# Patient Record
Sex: MALE | Race: WHITE | NOT HISPANIC OR LATINO | Employment: OTHER | URBAN - METROPOLITAN AREA
[De-identification: names, ages, dates, MRNs, and addresses within clinical notes are randomized per-mention and may not be internally consistent; named-entity substitution may affect disease eponyms.]

---

## 2017-02-15 ENCOUNTER — DOCUMENTATION ONLY (OUTPATIENT)
Dept: FAMILY MEDICINE | Facility: CLINIC | Age: 67
End: 2017-02-15

## 2017-02-15 NOTE — PROGRESS NOTES
Pre-Visit Chart Review  For Appointment Scheduled on 2-16-17    Health Maintenance Due   Topic Date Due    Zoster Vaccine  03/31/2008    Colonoscopy  11/01/2014    Influenza Vaccine  08/01/2016

## 2017-02-16 ENCOUNTER — OFFICE VISIT (OUTPATIENT)
Dept: FAMILY MEDICINE | Facility: CLINIC | Age: 67
End: 2017-02-16
Payer: MEDICARE

## 2017-02-16 ENCOUNTER — LAB VISIT (OUTPATIENT)
Dept: LAB | Facility: HOSPITAL | Age: 67
End: 2017-02-16
Attending: FAMILY MEDICINE
Payer: MEDICARE

## 2017-02-16 VITALS
SYSTOLIC BLOOD PRESSURE: 132 MMHG | TEMPERATURE: 98 F | DIASTOLIC BLOOD PRESSURE: 85 MMHG | HEIGHT: 69 IN | HEART RATE: 57 BPM | BODY MASS INDEX: 29.46 KG/M2 | WEIGHT: 198.88 LBS

## 2017-02-16 DIAGNOSIS — Z23 FLU VACCINE NEED: ICD-10-CM

## 2017-02-16 DIAGNOSIS — I10 ESSENTIAL HYPERTENSION: Primary | ICD-10-CM

## 2017-02-16 DIAGNOSIS — H61.21 HEARING LOSS OF RIGHT EAR DUE TO CERUMEN IMPACTION: ICD-10-CM

## 2017-02-16 DIAGNOSIS — I10 ESSENTIAL HYPERTENSION: ICD-10-CM

## 2017-02-16 LAB
ALBUMIN SERPL BCP-MCNC: 3.9 G/DL
ALP SERPL-CCNC: 97 U/L
ALT SERPL W/O P-5'-P-CCNC: 23 U/L
ANION GAP SERPL CALC-SCNC: 6 MMOL/L
AST SERPL-CCNC: 18 U/L
BILIRUB SERPL-MCNC: 1 MG/DL
BUN SERPL-MCNC: 16 MG/DL
CALCIUM SERPL-MCNC: 9.3 MG/DL
CHLORIDE SERPL-SCNC: 105 MMOL/L
CHOLEST/HDLC SERPL: 4.1 {RATIO}
CO2 SERPL-SCNC: 29 MMOL/L
CREAT SERPL-MCNC: 1.1 MG/DL
EST. GFR  (AFRICAN AMERICAN): >60 ML/MIN/1.73 M^2
EST. GFR  (NON AFRICAN AMERICAN): >60 ML/MIN/1.73 M^2
GLUCOSE SERPL-MCNC: 99 MG/DL
HDL/CHOLESTEROL RATIO: 24.2 %
HDLC SERPL-MCNC: 194 MG/DL
HDLC SERPL-MCNC: 47 MG/DL
LDLC SERPL CALC-MCNC: 116 MG/DL
NONHDLC SERPL-MCNC: 147 MG/DL
POTASSIUM SERPL-SCNC: 4.9 MMOL/L
PROT SERPL-MCNC: 6.9 G/DL
SODIUM SERPL-SCNC: 140 MMOL/L
TRIGL SERPL-MCNC: 155 MG/DL

## 2017-02-16 PROCEDURE — 1157F ADVNC CARE PLAN IN RCRD: CPT | Mod: S$GLB,,, | Performed by: FAMILY MEDICINE

## 2017-02-16 PROCEDURE — 1159F MED LIST DOCD IN RCRD: CPT | Mod: S$GLB,,, | Performed by: FAMILY MEDICINE

## 2017-02-16 PROCEDURE — 3075F SYST BP GE 130 - 139MM HG: CPT | Mod: S$GLB,,, | Performed by: FAMILY MEDICINE

## 2017-02-16 PROCEDURE — 1126F AMNT PAIN NOTED NONE PRSNT: CPT | Mod: S$GLB,,, | Performed by: FAMILY MEDICINE

## 2017-02-16 PROCEDURE — G0008 ADMIN INFLUENZA VIRUS VAC: HCPCS | Mod: S$GLB,,, | Performed by: FAMILY MEDICINE

## 2017-02-16 PROCEDURE — 80061 LIPID PANEL: CPT

## 2017-02-16 PROCEDURE — 99999 PR PBB SHADOW E&M-EST. PATIENT-LVL III: CPT | Mod: PBBFAC,,, | Performed by: FAMILY MEDICINE

## 2017-02-16 PROCEDURE — 99214 OFFICE O/P EST MOD 30 MIN: CPT | Mod: 25,S$GLB,, | Performed by: FAMILY MEDICINE

## 2017-02-16 PROCEDURE — 90662 IIV NO PRSV INCREASED AG IM: CPT | Mod: S$GLB,,, | Performed by: FAMILY MEDICINE

## 2017-02-16 PROCEDURE — 80053 COMPREHEN METABOLIC PANEL: CPT

## 2017-02-16 PROCEDURE — 99499 UNLISTED E&M SERVICE: CPT | Mod: S$GLB,,, | Performed by: FAMILY MEDICINE

## 2017-02-16 PROCEDURE — 1160F RVW MEDS BY RX/DR IN RCRD: CPT | Mod: S$GLB,,, | Performed by: FAMILY MEDICINE

## 2017-02-16 PROCEDURE — 69209 REMOVE IMPACTED EAR WAX UNI: CPT | Mod: S$GLB,,, | Performed by: FAMILY MEDICINE

## 2017-02-16 PROCEDURE — 3079F DIAST BP 80-89 MM HG: CPT | Mod: S$GLB,,, | Performed by: FAMILY MEDICINE

## 2017-02-16 PROCEDURE — 36415 COLL VENOUS BLD VENIPUNCTURE: CPT | Mod: PO

## 2017-02-16 RX ORDER — HYDROCODONE BITARTRATE AND ACETAMINOPHEN 10; 325 MG/1; MG/1
TABLET ORAL
COMMUNITY
Start: 2016-12-29 | End: 2017-02-16

## 2017-02-16 RX ORDER — CEPHALEXIN 500 MG/1
CAPSULE ORAL
COMMUNITY
Start: 2016-12-08 | End: 2017-02-16

## 2017-02-16 RX ORDER — LISINOPRIL 10 MG/1
10 TABLET ORAL DAILY
Qty: 90 TABLET | Refills: 3 | Status: SHIPPED | OUTPATIENT
Start: 2017-02-16 | End: 2018-02-04 | Stop reason: SDUPTHER

## 2017-02-16 NOTE — PROCEDURES
"Ear Cerumen Removal  Date/Time: 2/16/2017 8:45 AM  Performed by: HELENA HADLEY  Authorized by: HELENA HADLEY     Time out: Immediately prior to procedure a "time out" was called to verify the correct patient, procedure, equipment, support staff and site/side marked as required.    Consent Done?:  Yes (Verbal)  Location details:  Right ear  Procedure type: irrigation    Cerumen  Removal Results:  Cerumen completely removed  Patient tolerance:  Patient tolerated the procedure well with no immediate complications      "

## 2017-02-16 NOTE — MR AVS SNAPSHOT
[facility]  [address] [address]  Phone: [phone]  Fax: [phone]                  Efraín Anton   2017 8:00 AM   Office Visit    Description:  Male : 1950   Provider:  Sandi Nix MD   Department:  [facility]           Reason for Visit     Follow-up     Ear Fullness           Diagnoses this Visit        Comments    Essential hypertension    -  Primary     Flu vaccine need         Hearing loss of right ear due to cerumen impaction                To Do List           Future Appointments        Provider Department Dept Phone    2017 9:00 AM [facility]NALE[facility] [phone]    2017 8:00 AM Sandi Nix MD [facility] [phone]      Goals (5 Years of Data)     None      Follow-Up and Disposition     Return in about 6 months (around 2017).    Follow-up and Disposition History       These Medications        Disp Refills Start End    lisinopril 10 MG tablet 90 tablet 3 2017    Take 1 tablet (10 mg total) by mouth once daily. - Oral    Pharmacy: [facility] [address] [place], [place] [id number] [facility] Ph #: [phone]         [facility] On Call     [facility] On Call Nurse Care Line -  Assistance  Registered nurses in the [facility] provide clinical advisement, health education, appointment booking, and other advisory services.  Call for this free service at [phone].             Medications           STOP taking these medications     cephALEXin (KEFLEX) 500 MG capsule     hydrocodone-acetaminophen 10-325mg (NORCO)  mg Tab     meloxicam (MOBIC) 7.5 MG tablet Take 1 tablet (7.5 mg total) by mouth once daily.           Verify that the below list of medications is an accurate representation of the medications you are currently taking.  If none reported, the list may be blank. If incorrect, please contact your healthcare provider. Carry this list  "with you in case of emergency.           Current Medications     lisinopril 10 MG tablet Take 1 tablet (10 mg total) by mouth once daily.    triamcinolone acetonide 0.1% (KENALOG) 0.1 % cream APPLY TO AFFECTED AREA TWICE A DAY           Clinical Reference Information           Your Vitals Were     BP Pulse Temp Height Weight BMI    132/85 (BP Location: Right arm, Patient Position: Sitting, BP Method: Automatic) 57 97.8 °F (36.6 °C) (Oral) 5' 9" (1.753 m) 90.2 kg (198 lb 13.7 oz) 29.37 kg/m2      Blood Pressure          Most Recent Value    BP  132/85      Allergies as of 2/16/2017     No Known Allergies      Immunizations Administered on Date of Encounter - 2/16/2017     Name Date Dose VIS Date Route    Influenza - High Dose 2/16/2017 0.5 mL 8/7/2015 Intramuscular      Orders Placed During Today's Visit      Normal Orders This Visit    EAR CERUMEN REMOVAL     Ear wax removal     Influenza - High Dose (65+) (PF) (IM)     Future Labs/Procedures Expected by Expires    Comprehensive metabolic panel  2/16/2017 4/17/2018    Lipid panel  2/16/2017 4/17/2018      [facility] Sign-Up     Activating your [facility] account is as easy as 1-2-3!     1) Visit [email or web address], select Sign Up Now, enter this activation code and your date of birth, then select Next.  Activation code not generated  Current Patient Portal Status: Account disabled      2) Create a username and password to use when you visit [facility] in the future and select a security question in case you lose your password and select Next.    3) Enter your e-mail address and click Sign Up!    Additional Information  If you have questions, please e-mail [email or web address] or call [phone] to talk to our [facility] staff. Remember, [facility] is NOT to be used for urgent needs. For medical emergencies, dial 911.         Instructions      Controlling High Blood Pressure  High blood pressure (hypertension) is often called the silent killer. This is because many people " who have it dont know it. High blood pressure is defined as 140/90 mm Hg or higher. Know your blood pressure and remember to check it regularly. Doing so can save your life. Here are some things you can do to help control your blood pressure.    Choose heart-healthy foods  · Select low-salt, low-fat foods. Limit sodium intake to 2,400 mg per day or the amount suggested by your healthcare provider.  · Limit canned, dried, cured, packaged, and fast foods. These can contain a lot of salt.  · Eat 8 to 10 servings of fruits and vegetables every day.  · Choose lean meats, fish, or chicken.  · Eat whole-grain pasta, brown rice, and beans.  · Eat 2 to 3 servings of low-fat or fat-free dairy products.  · Ask your doctor about the DASH eating plan. This plan helps reduce blood pressure.  · When you go to a restaurant, ask that your meal be prepared with no added salt.  Maintain a healthy weight  · Ask your healthcare provider how many calories to eat a day. Then stick to that number.  · Ask your healthcare provider what weight range is healthiest for you. If you are overweight, a weight loss of only 3% to 5% of your body weight can help lower blood pressure. Generally, a good weight loss goal is to lose 10% of your body weight in a year.  · Limit snacks and sweets.  · Get regular exercise.  Get up and get active  · Choose activities you enjoy. Find ones you can do with friends or family. This includes bicycling, dancing, walking, and jogging.  · Park farther away from building entrances.  · Use stairs instead of the elevator.  · When you can, walk or bike instead of driving.  · [place] leaves, garden, or do household repairs.  · Be active at a moderate to vigorous level of physical activity for at least 40 minutes for a minimum of 3 to 4 days a week.   Manage stress  · Make time to relax and enjoy life. Find time to laugh.  · Communicate your concerns with your loved ones and your healthcare provider.  · Visit with family and  friends, and keep up with hobbies.  Limit alcohol and quit smoking  · Men should have no more than 2 drinks per day.  · Women should have no more than 1 drink per day.  · Talk with your healthcare provider about quitting smoking. Smoking significantly increases your risk for heart disease and stroke. Ask your healthcare provider about community smoking cessation programs and other options.  Medicines  If lifestyle changes arent enough, your healthcare provider may prescribe high blood pressure medicine. Take all medicines as prescribed. If you have any questions about your medicines, ask your healthcare provider before stopping or changing them.   Date Last Reviewed: 4/27/2016  © 3312-4501 [employer]. [address] [place] [address], [place] [address]. All rights reserved. This information is not intended as a substitute for professional medical care. Always follow your healthcare professional's instructions.             Language Assistance Services     ATTENTION: Language assistance services are available, free of charge. Please call [phone].      ATENCIÓN: Si habla español, tiene a macias disposición servicios gratuitos de asistencia lingüística. Llame al [phone].     [facility] Ý: N?u b?n nói Ti?ng Vi?t, có các d?ch v? h? tr? ngôn ng? mi?n phí dành cho b?n. G?i s? [phone].         [facility] complies with applicable Federal civil rights laws and does not discriminate on the basis of race, color, national origin, age, disability, or sex.

## 2017-02-16 NOTE — PROGRESS NOTES
Subjective:       Patient ID: Efraín Anton is a 66 y.o. male.    Chief Complaint: Follow-up and Ear Fullness    Patient Active Problem List   Diagnosis    Hypertension    Primary osteoarthritis of both shoulders    Tinnitus of right ear    Eczema    Arthritis of shoulder    Arthritis of left shoulder region   C/o feeling of ears clogging up pierce right ear.  Did use q tips and does occ wear hearing aids.  [facility] them [facility] a year [facility]    HPI  Review of Systems   Constitutional: Negative for fatigue and unexpected weight change.   Respiratory: Negative for chest tightness and shortness of breath.    Cardiovascular: Negative for chest pain, palpitations and leg swelling.   Gastrointestinal: Negative for abdominal pain.   Endocrine: Negative for polydipsia, polyphagia and polyuria.   Musculoskeletal: Negative for arthralgias.   Neurological: Negative for dizziness, syncope, light-headedness and headaches.       Objective:      Physical Exam   Constitutional: He is oriented to person, place, and time. He appears well-developed and well-nourished.   HENT:   Right Ear: Tympanic membrane normal.   Left Ear: Tympanic membrane and ear canal normal.   Right canal dark cerumen close to TM   Cardiovascular: Normal rate, regular rhythm and normal heart sounds.    Pulmonary/Chest: Effort normal and breath sounds normal.   Musculoskeletal: He exhibits no edema.   Neurological: He is alert and oriented to person, place, and time.   Skin: Skin is warm and dry.   Psychiatric: He has a normal mood and affect.   Nursing note and vitals reviewed.      Assessment:       1. Essential hypertension    2. Flu vaccine need    3. Hearing loss of right ear due to cerumen impaction        Plan:       1. Essential hypertension  Controlled on current medications.  Continue current medications.    - Comprehensive metabolic panel; Future  - Lipid panel; Future  - lisinopril 10 MG tablet; Take 1 tablet (10 mg total) by mouth once daily.   Dispense: 90 tablet; Refill: 3    2. Flu vaccine need  Immunize today.  Counseled patient on risks, benefits and side effects.  Patient elected to proceed with vaccination.    - Influenza - High Dose (65+) (PF) (IM)    3. Hearing loss of right ear due to cerumen impaction  S/p  - Ear wax removal      [facility] goal documentation:  Patient readiness: acceptance and barriers:readiness  During the course of the visit the patient was educated and counseled about the following: Hypertension:   Dietary sodium restriction.  Regular aerobic exercise.  Goals: Hypertension: Reduce Blood Pressure  Goal/Outcomes met:Hypertension  The following self management tools provided:none  Patient Instructions (the written plan) was given to the patient/family: Yes  Time spent with patient: 20 minutes    Patient with be reevaluated in 6 months or sooner prn.  Will get colonoscopy at [facility]    Greater than 50% of this visit was spent counseling as described in above documentation:Yes

## 2017-02-16 NOTE — PATIENT INSTRUCTIONS
Controlling High Blood Pressure  High blood pressure (hypertension) is often called the silent killer. This is because many people who have it dont know it. High blood pressure is defined as 140/90 mm Hg or higher. Know your blood pressure and remember to check it regularly. Doing so can save your life. Here are some things you can do to help control your blood pressure.    Choose heart-healthy foods  · Select low-salt, low-fat foods. Limit sodium intake to 2,400 mg per day or the amount suggested by your healthcare provider.  · Limit canned, dried, cured, packaged, and fast foods. These can contain a lot of salt.  · Eat 8 to 10 servings of fruits and vegetables every day.  · Choose lean meats, fish, or chicken.  · Eat whole-grain pasta, brown rice, and beans.  · Eat 2 to 3 servings of low-fat or fat-free dairy products.  · Ask your doctor about the DASH eating plan. This plan helps reduce blood pressure.  · When you go to a restaurant, ask that your meal be prepared with no added salt.  Maintain a healthy weight  · Ask your healthcare provider how many calories to eat a day. Then stick to that number.  · Ask your healthcare provider what weight range is healthiest for you. If you are overweight, a weight loss of only 3% to 5% of your body weight can help lower blood pressure. Generally, a good weight loss goal is to lose 10% of your body weight in a year.  · Limit snacks and sweets.  · Get regular exercise.  Get up and get active  · Choose activities you enjoy. Find ones you can do with friends or family. This includes bicycling, dancing, walking, and jogging.  · Park farther away from building entrances.  · Use stairs instead of the elevator.  · When you can, walk or bike instead of driving.  · [place] leaves, garden, or do household repairs.  · Be active at a moderate to vigorous level of physical activity for at least 40 minutes for a minimum of 3 to 4 days a week.   Manage stress  · Make time to relax and enjoy  life. Find time to laugh.  · Communicate your concerns with your loved ones and your healthcare provider.  · Visit with family and friends, and keep up with hobbies.  Limit alcohol and quit smoking  · Men should have no more than 2 drinks per day.  · Women should have no more than 1 drink per day.  · Talk with your healthcare provider about quitting smoking. Smoking significantly increases your risk for heart disease and stroke. Ask your healthcare provider about community smoking cessation programs and other options.  Medicines  If lifestyle changes arent enough, your healthcare provider may prescribe high blood pressure medicine. Take all medicines as prescribed. If you have any questions about your medicines, ask your healthcare provider before stopping or changing them.   Date Last Reviewed: 4/27/2016  © 1129-3216 [employer]. [address] [place] [address], [place] [address]. All rights reserved. This information is not intended as a substitute for professional medical care. Always follow your healthcare professional's instructions.

## 2017-02-21 ENCOUNTER — TELEPHONE (OUTPATIENT)
Dept: FAMILY MEDICINE | Facility: CLINIC | Age: 67
End: 2017-02-21

## 2017-02-21 NOTE — TELEPHONE ENCOUNTER
----- Message from Beena Augustine sent at 2/21/2017  8:56 AM CST -----  Contact: pt [phone]   Call placed to pod. Patient called back for his lab results.

## 2017-08-14 ENCOUNTER — DOCUMENTATION ONLY (OUTPATIENT)
Dept: FAMILY MEDICINE | Facility: CLINIC | Age: 67
End: 2017-08-14

## 2017-08-14 NOTE — PROGRESS NOTES
Pre-Visit Chart Review  For Appointment Scheduled on 08/16/2017    Health Maintenance Due   Topic Date Due    Zoster Vaccine  03/31/2008    Colonoscopy  11/01/2014    Influenza Vaccine  08/01/2017

## 2017-08-16 ENCOUNTER — HOSPITAL ENCOUNTER (OUTPATIENT)
Dept: RADIOLOGY | Facility: HOSPITAL | Age: 67
Discharge: HOME | End: 2017-08-16
Attending: ORTHOPAEDIC SURGERY
Payer: MEDICARE

## 2017-08-16 ENCOUNTER — OFFICE VISIT (OUTPATIENT)
Dept: ORTHOPEDICS | Facility: CLINIC | Age: 67
End: 2017-08-16
Payer: MEDICARE

## 2017-08-16 ENCOUNTER — OFFICE VISIT (OUTPATIENT)
Dept: FAMILY MEDICINE | Facility: CLINIC | Age: 67
End: 2017-08-16
Payer: MEDICARE

## 2017-08-16 VITALS — WEIGHT: 197 LBS | HEIGHT: 69 IN | BODY MASS INDEX: 29.18 KG/M2

## 2017-08-16 VITALS
BODY MASS INDEX: 29.19 KG/M2 | HEART RATE: 64 BPM | WEIGHT: 197.06 LBS | HEIGHT: 69 IN | TEMPERATURE: 98 F | DIASTOLIC BLOOD PRESSURE: 86 MMHG | SYSTOLIC BLOOD PRESSURE: 127 MMHG

## 2017-08-16 DIAGNOSIS — M25.511 ACUTE PAIN OF BOTH SHOULDERS: Primary | ICD-10-CM

## 2017-08-16 DIAGNOSIS — Z86.010 HISTORY OF COLON POLYPS: ICD-10-CM

## 2017-08-16 DIAGNOSIS — K22.2 ESOPHAGEAL STRICTURE: ICD-10-CM

## 2017-08-16 DIAGNOSIS — M19.012 PRIMARY OSTEOARTHRITIS OF BOTH SHOULDERS: Primary | ICD-10-CM

## 2017-08-16 DIAGNOSIS — M19.011 PRIMARY OSTEOARTHRITIS OF BOTH SHOULDERS: Primary | ICD-10-CM

## 2017-08-16 DIAGNOSIS — M25.511 ACUTE PAIN OF BOTH SHOULDERS: ICD-10-CM

## 2017-08-16 DIAGNOSIS — M25.512 ACUTE PAIN OF BOTH SHOULDERS: Primary | ICD-10-CM

## 2017-08-16 DIAGNOSIS — R13.10 DYSPHAGIA, UNSPECIFIED TYPE: ICD-10-CM

## 2017-08-16 DIAGNOSIS — M19.012 ARTHRITIS OF LEFT SHOULDER REGION: ICD-10-CM

## 2017-08-16 DIAGNOSIS — M25.512 ACUTE PAIN OF BOTH SHOULDERS: ICD-10-CM

## 2017-08-16 DIAGNOSIS — M19.011 PRIMARY OSTEOARTHRITIS OF RIGHT SHOULDER: Primary | ICD-10-CM

## 2017-08-16 PROCEDURE — 73030 X-RAY EXAM OF SHOULDER: CPT | Mod: 26,50,, | Performed by: RADIOLOGY

## 2017-08-16 PROCEDURE — 99499 UNLISTED E&M SERVICE: CPT | Mod: S$GLB,,, | Performed by: FAMILY MEDICINE

## 2017-08-16 PROCEDURE — 99214 OFFICE O/P EST MOD 30 MIN: CPT | Mod: S$GLB,,, | Performed by: FAMILY MEDICINE

## 2017-08-16 PROCEDURE — 99214 OFFICE O/P EST MOD 30 MIN: CPT | Mod: S$GLB,,, | Performed by: ORTHOPAEDIC SURGERY

## 2017-08-16 PROCEDURE — 3008F BODY MASS INDEX DOCD: CPT | Mod: S$GLB,,, | Performed by: FAMILY MEDICINE

## 2017-08-16 PROCEDURE — 3008F BODY MASS INDEX DOCD: CPT | Mod: S$GLB,,, | Performed by: ORTHOPAEDIC SURGERY

## 2017-08-16 PROCEDURE — 73030 X-RAY EXAM OF SHOULDER: CPT | Mod: TC,50,PN

## 2017-08-16 PROCEDURE — 3079F DIAST BP 80-89 MM HG: CPT | Mod: S$GLB,,, | Performed by: FAMILY MEDICINE

## 2017-08-16 PROCEDURE — 3078F DIAST BP <80 MM HG: CPT | Mod: S$GLB,,, | Performed by: ORTHOPAEDIC SURGERY

## 2017-08-16 PROCEDURE — 99999 PR PBB SHADOW E&M-EST. PATIENT-LVL II: CPT | Mod: PBBFAC,,, | Performed by: ORTHOPAEDIC SURGERY

## 2017-08-16 PROCEDURE — 1159F MED LIST DOCD IN RCRD: CPT | Mod: S$GLB,,, | Performed by: FAMILY MEDICINE

## 2017-08-16 PROCEDURE — 3074F SYST BP LT 130 MM HG: CPT | Mod: S$GLB,,, | Performed by: FAMILY MEDICINE

## 2017-08-16 PROCEDURE — 1159F MED LIST DOCD IN RCRD: CPT | Mod: S$GLB,,, | Performed by: ORTHOPAEDIC SURGERY

## 2017-08-16 PROCEDURE — 3074F SYST BP LT 130 MM HG: CPT | Mod: S$GLB,,, | Performed by: ORTHOPAEDIC SURGERY

## 2017-08-16 PROCEDURE — 1125F AMNT PAIN NOTED PAIN PRSNT: CPT | Mod: S$GLB,,, | Performed by: FAMILY MEDICINE

## 2017-08-16 PROCEDURE — 99999 PR PBB SHADOW E&M-EST. PATIENT-LVL III: CPT | Mod: PBBFAC,,, | Performed by: FAMILY MEDICINE

## 2017-08-16 PROCEDURE — 1125F AMNT PAIN NOTED PAIN PRSNT: CPT | Mod: S$GLB,,, | Performed by: ORTHOPAEDIC SURGERY

## 2017-08-16 NOTE — LETTER
August 16, 2017      Sandi Nix MD  [address]  [facility] [address]           [address] [address]  Phone: [phone]          Patient: Efraín Anton   MR Number: [id number]   YOB: 1950   Date of Visit: 8/16/2017       Dear Dr. Sandi Nix:    Thank you for referring Efraín Anton to me for evaluation. Attached you will find relevant portions of my assessment and plan of care.    If you have questions, please do not hesitate to call me. I look forward to following Efraín Anton along with you.    Sincerely,    Ricky Lobo MD    Enclosure  CC:  No Recipients    If you would like to receive this communication electronically, please contact [email or web address] or [phone] to request more information on [employer] Link access.    For providers and/or their staff who would like to refer a patient to [facility], please contact us through our one-stop-shop provider referral line, [facility], at [phone].    If you feel you have received this communication in error or would no longer like to receive these types of communications, please e-mail [email or web address]

## 2017-08-17 ENCOUNTER — INITIAL CONSULT (OUTPATIENT)
Dept: GASTROENTEROLOGY | Facility: CLINIC | Age: 67
End: 2017-08-17
Payer: MEDICARE

## 2017-08-17 VITALS
SYSTOLIC BLOOD PRESSURE: 116 MMHG | WEIGHT: 195.13 LBS | DIASTOLIC BLOOD PRESSURE: 76 MMHG | HEART RATE: 67 BPM | BODY MASS INDEX: 28.9 KG/M2 | HEIGHT: 69 IN | RESPIRATION RATE: 20 BRPM

## 2017-08-17 DIAGNOSIS — R13.19 OTHER DYSPHAGIA: Primary | ICD-10-CM

## 2017-08-17 DIAGNOSIS — R13.10 DYSPHAGIA, UNSPECIFIED TYPE: Primary | ICD-10-CM

## 2017-08-17 PROCEDURE — 99205 OFFICE O/P NEW HI 60 MIN: CPT | Mod: S$GLB,,, | Performed by: INTERNAL MEDICINE

## 2017-08-17 PROCEDURE — 1126F AMNT PAIN NOTED NONE PRSNT: CPT | Mod: S$GLB,,, | Performed by: INTERNAL MEDICINE

## 2017-08-17 PROCEDURE — 3074F SYST BP LT 130 MM HG: CPT | Mod: S$GLB,,, | Performed by: INTERNAL MEDICINE

## 2017-08-17 PROCEDURE — 3008F BODY MASS INDEX DOCD: CPT | Mod: S$GLB,,, | Performed by: INTERNAL MEDICINE

## 2017-08-17 PROCEDURE — 99999 PR PBB SHADOW E&M-EST. PATIENT-LVL III: CPT | Mod: PBBFAC,,, | Performed by: INTERNAL MEDICINE

## 2017-08-17 PROCEDURE — 3078F DIAST BP <80 MM HG: CPT | Mod: S$GLB,,, | Performed by: INTERNAL MEDICINE

## 2017-08-17 PROCEDURE — 1159F MED LIST DOCD IN RCRD: CPT | Mod: S$GLB,,, | Performed by: INTERNAL MEDICINE

## 2017-08-17 NOTE — LETTER
August 17, 2017      Sandi Nix MD  [address]  [place] [address]           [facility]  [address]  [place] [address]  Phone: [phone]          Patient: Efraín Anton   MR Number: [id number]   YOB: 1950   Date of Visit: 8/17/2017       Dear Dr. Sandi Nix:    Thank you for referring Efraín Anton to me for evaluation. Attached you will find relevant portions of my assessment and plan of care.    If you have questions, please do not hesitate to call me. I look forward to following Efraín Anton along with you.    Sincerely,    Xi Acosta MD    Enclosure  CC:  No Recipients    If you would like to receive this communication electronically, please contact [email or web address] or [phone] to request more information on [employer] Link access.    For providers and/or their staff who would like to refer a patient to [facility], please contact us through our one-stop-shop provider referral line, [facility], at [phone].    If you feel you have received this communication in error or would no longer like to receive these types of communications, please e-mail [email or web address]

## 2017-08-17 NOTE — PROGRESS NOTES
[facility]     CC: Dysphagia    HPI 67 y.o. male presents with chronic, intermittent, solid food dysphagia (meat, rice) x several years, worse over the last several weeks, not associated with odynophagia or weight loss. Over the past 4-5 years he has had food bolus impaction three times requiring emergent endoscopy at [facility], the last of which was 1 year ago and at that time he had esophageal dilation (records not currently available). His dysphagia resolved after dilation until recently. He denies heartburn and has a history of tobacco use (quit >30 years ago). Other than chronic shoulder pain he feels well.     Past Medical History:   Diagnosis Date    Arthritis     Eczema     Hypertension     Tinnitus, objective        Past Surgical History:   Procedure Laterality Date    COLONOSCOPY W/ POLYPECTOMY         Social History   Substance Use Topics    Smoking status: Former Smoker     Packs/day: 3.00     Years: 25.00     Types: Cigarettes     Start date: 6/8/1963     Quit date: 3/27/1989    Smokeless tobacco: Never Used    Alcohol use 3.0 oz/week     4 Cans of beer, 1 Shots of liquor per week   -Denies illicit drug use    Family History:  -No family history of esophageal or gastric cancer    Review of Systems  General ROS: negative for - chills, fever or weight loss  Psychological ROS: negative for - hallucination, depression or suicidal ideation  Ophthalmic ROS: negative for - blurry vision, photophobia or eye pain  ENT ROS: negative for - epistaxis, sore throat or rhinorrhea  Respiratory ROS: no cough, shortness of breath, or wheezing  Cardiovascular ROS: no chest pain or dyspnea on exertion  Gastrointestinal ROS: + dysphagia to solids, no diarrhea, no odynophagia, no heartburn  Genito-Urinary ROS: no dysuria, trouble voiding, or hematuria  Musculoskeletal ROS: negative for - arthralgia, myalgia, weakness; + chronic right shoulder pain  Neurological ROS: no syncope or seizures; no  "ataxia  Dermatological ROS: negative for pruritis, rash and jaundice    Physical Examination  /76   Pulse 67   Resp 20   Ht 5' 9" (1.753 m)   Wt 88.5 kg (195 lb 1.7 oz)   BMI 28.81 kg/m²   General appearance: alert, cooperative, no distress  HENT: Normocephalic, atraumatic, neck symmetrical, no nasal discharge   Eyes: conjunctivae/corneas clear, PERRL, EOM's intact, sclera anicteric  Lungs: clear to auscultation bilaterally, no dullness to percussion bilaterally, symmetric expansion, breathing unlabored  Heart: regular rate and rhythm without rub; no displacement of the PMI   Abdomen: soft, nontender, nondistended, BS normoactive, no organomegaly  Extremities: extremities symmetric; no clubbing, cyanosis, or edema  Integument: Skin color, texture, turgor normal; no rashes; hair distrubution normal, no jaundice  Neurologic: Alert and oriented X 3, no focal sensory or motor neurologic deficits  Psychiatric: no pressured speech; normal affect; no evidence of impaired cognition, no anxiety/depression     Labs:  February 2017- LFTs-normal    Lab Results   Component Value Date    WBC 7.43 07/18/2016    HGB 14.2 07/18/2016    HCT 41.8 07/18/2016    MCV 94 07/18/2016     07/18/2016         Imaging:  CXR July 2016 was independently visualized and reviewed by me and showed clear lungs, mild RUL fibrosis.    Assessment:   67 y.o. male with history of food bolus impaction x 3 requiring emergent endoscopy (last  1 year ago with esophageal dilation), presents with recurrent dysphagia.     Plan:  -EGD with dilation next available  -Counseled to chew food thoroughly and take small bites  -Obtain endoscopy reports from [facility]      MD [facility]  [address], [address]  [place] [address]  Office: [phone]  Fax: [phone]  "

## 2017-08-17 NOTE — PROGRESS NOTES
DATE: 8/16/2017  PATIENT: Efraín Anton  REFERRING MD:   CHIEF COMPLAINT:   Chief Complaint   Patient presents with    Left Shoulder - Pain    Right Shoulder - Pain       HISTORY:  Efraín Anton is a 67 y.o. right hand dominant male  who presents for initial evaluation of bilateral shoulder pain.  He notes he had a fracture 8 years ago to his right shoulder.  He had no surgery.  He reports a history of osteoarthrosis.  He was seen by Dr. Dean in July 2016 underwent cortisone injections which did seem to help temporarily.  States his pain currently is 6/10.  Pain is worse in the right left shoulder.  He is currently retired.    PAST MEDICAL/SURGICAL HISTORY:  Past Medical History:   Diagnosis Date    Arthritis     Eczema     Hypertension     Tinnitus, objective      Past Surgical History:   Procedure Laterality Date    COLONOSCOPY W/ POLYPECTOMY         Current Medications:   Current Outpatient Prescriptions:     lisinopril 10 MG tablet, Take 1 tablet (10 mg total) by mouth once daily., Disp: 90 tablet, Rfl: 3    Social History:   Social History     Social History    Marital status:      Spouse name: N/A    Number of children: N/A    Years of education: N/A     Occupational History    Not on file.     Social History Main Topics    Smoking status: Former Smoker     Packs/day: 3.00     Years: 25.00     Types: Cigarettes     Start date: 6/8/1963     Quit date: 3/27/1989    Smokeless tobacco: Never Used    Alcohol use 3.0 oz/week     4 Cans of beer, 1 Shots of liquor per week    Drug use: No    Sexual activity: Not on file     Other Topics Concern    Not on file     Social History Narrative    No narrative on file       ROS:  Constitution: Negative for chills, fever, and sweats. Negative for unexplained weight loss.  HENT: Negative for headaches and blurry vision.   Cardiovascular: Negative for chest pain, irregular heartbeat, leg swelling and palpitations.   Respiratory: Negative for  "cough and shortness of breath.   Gastrointestinal: Negative for abdominal pain, heartburn, nausea and vomiting.   Genitourinary: Negative for bladder incontinence and dysuria.   Musculoskeletal: Negative for systemic arthritis, joint swelling, muscle weakness and myalgias.   Neurological: Negative for numbness.   Psychiatric/Behavioral: Negative for depression.   Endocrine: Negative for polyuria.   Hematologic/Lymphatic: Negative for bleeding disorders.  Skin: Negative for poor wound healing.       PHYSICAL EXAM:  Ht 5' 9" (1.753 m)   Wt 89.4 kg (197 lb)   BMI 29.09 kg/m²   Efraín Anton is a well developed, well nourished male in no acute distress. Physical examination of the bilateral shoulder evaluated the following:    Inspection, palpation and ROM of the cervical spine  Disc compression testing bilaterally  Inspection for swelling, ecchymosis, erythema, deformity and atrophy  Tenderness to palpation of the soft tissue and bony structures  Active and passive range of motion  Sensation of the shoulder and upper extremity  Motor strength in the deltoid, supraspinatus, internal rotators and external rotators  Impingement, apprehension, relocation and Speed's tests  Upper extremity vascular exam (skin temp,color, capillary refill)  Inspection for pseudomotor signs    Remarkable findings included:  Full range of motion cervical spine.  Negative disc compression sign.  Examination of both shoulders revealed significantly limited range of motion to approximately 80° of forward elevation on the right and 100° on the left.  Abduction to 80° on the right and 90° on the left.  External rotation limited to 45° on the right and 60° on the left.  Motor strength 5/5 in the supraspinatous and external rotators bilaterally.  Crepitus with range of motion of both shoulders.  No impingement sign either shoulder.          IMAGING:   X-rays of both shoulders reviewed.  No acute fractures are seen.  Severe glenohumeral arthrosis " with bone-on-bone deformities and osteophyte formation noted.     ASSESSMENT:   Severe osteoarthrosis both shoulders    PLAN:  The nature of the diagnosis, using models and diagrams when appropriate, was explained to the patient in detail.Treatment option discussed included observation, repeat cortisone injection and total shoulder arthroplasty.  I went over the pros and cons and risk and benefits of each.  At the present time frame and wishes to observe his symptoms.  However should his symptoms worsen he will contact the office for consideration of injection versus further discuss arthroplasty.  Follow-up if not improving or worse.      This note was dictated using voice recognition software and may contain grammatical errors

## 2017-08-27 NOTE — PROGRESS NOTES
Subjective:       Patient ID: Efraín Anton is a 67 y.o. male.    Chief Complaint: Follow-up    Patient Active Problem List   Diagnosis    Hypertension    Primary osteoarthritis of both shoulders    Tinnitus of right ear    Eczema    Arthritis of shoulder    Arthritis of left shoulder region   c/o pain right shoulder     Has h/o esophageal stretching 1 year ago    Had colonoscopy with + polyp 3 years ago at [facility]  HPI  Review of Systems   Constitutional: Negative for fatigue and unexpected weight change.   Respiratory: Negative for chest tightness and shortness of breath.    Cardiovascular: Negative for chest pain, palpitations and leg swelling.   Gastrointestinal: Negative for abdominal pain.   Musculoskeletal: Positive for arthralgias.   Neurological: Negative for dizziness, syncope, light-headedness and headaches.       Objective:      Physical Exam   Constitutional: He is oriented to person, place, and time. He appears well-developed and well-nourished.   Cardiovascular: Normal rate, regular rhythm and normal heart sounds.    Pulmonary/Chest: Effort normal and breath sounds normal.   Musculoskeletal: He exhibits no edema.        Right shoulder: He exhibits decreased range of motion and tenderness.        Left shoulder: He exhibits decreased range of motion and tenderness.   Neurological: He is alert and oriented to person, place, and time.   Skin: Skin is warm and dry.   Psychiatric: He has a normal mood and affect.   Nursing note and vitals reviewed.      Assessment:       1. Primary osteoarthritis of both shoulders    2. Esophageal stricture    3. Dysphagia, unspecified type    4. History of colon polyps        Plan:       1. Primary osteoarthritis of both shoulders  Refer for eval and treat  - Ambulatory referral to Orthopedics    2. Esophageal stricture  Refer for consult for returning sx  - Ambulatory referral to Gastroenterology    3. Dysphagia, unspecified type  See above  - Ambulatory referral to  Gastroenterology    4. History of colon polyps  Get records from [facility]    Reeval 6 months or sooner prn

## 2017-10-10 ENCOUNTER — ANESTHESIA (OUTPATIENT)
Dept: ENDOSCOPY | Facility: HOSPITAL | Age: 67
End: 2017-10-10
Payer: MEDICARE

## 2017-10-10 ENCOUNTER — ANESTHESIA EVENT (OUTPATIENT)
Dept: ENDOSCOPY | Facility: HOSPITAL | Age: 67
End: 2017-10-10
Payer: MEDICARE

## 2017-10-10 ENCOUNTER — HOSPITAL ENCOUNTER (OUTPATIENT)
Facility: HOSPITAL | Age: 67
Discharge: HOME | End: 2017-10-10
Attending: INTERNAL MEDICINE | Admitting: INTERNAL MEDICINE
Payer: MEDICARE

## 2017-10-10 ENCOUNTER — SURGERY (OUTPATIENT)
Age: 67
End: 2017-10-10

## 2017-10-10 VITALS — RESPIRATION RATE: 49 BRPM

## 2017-10-10 DIAGNOSIS — K22.2 ESOPHAGEAL STENOSIS: ICD-10-CM

## 2017-10-10 DIAGNOSIS — R13.10 DYSPHAGIA: ICD-10-CM

## 2017-10-10 DIAGNOSIS — K20.90 ESOPHAGITIS: Primary | ICD-10-CM

## 2017-10-10 PROBLEM — K44.9 HIATAL HERNIA: Status: ACTIVE | Noted: 2017-10-10

## 2017-10-10 PROCEDURE — 27201012 HC FORCEPS, HOT/COLD, DISP: Performed by: INTERNAL MEDICINE

## 2017-10-10 PROCEDURE — 43248 EGD GUIDE WIRE INSERTION: CPT | Performed by: INTERNAL MEDICINE

## 2017-10-10 PROCEDURE — 43239 EGD BIOPSY SINGLE/MULTIPLE: CPT | Mod: 51,,, | Performed by: INTERNAL MEDICINE

## 2017-10-10 PROCEDURE — 43239 EGD BIOPSY SINGLE/MULTIPLE: CPT | Mod: 59 | Performed by: INTERNAL MEDICINE

## 2017-10-10 PROCEDURE — 43248 EGD GUIDE WIRE INSERTION: CPT | Mod: ,,, | Performed by: INTERNAL MEDICINE

## 2017-10-10 PROCEDURE — D9220A PRA ANESTHESIA: Mod: ANES,,, | Performed by: ANESTHESIOLOGY

## 2017-10-10 PROCEDURE — 25000003 PHARM REV CODE 250: Performed by: INTERNAL MEDICINE

## 2017-10-10 PROCEDURE — 63600175 PHARM REV CODE 636 W HCPCS: Performed by: NURSE ANESTHETIST, CERTIFIED REGISTERED

## 2017-10-10 PROCEDURE — 37000008 HC ANESTHESIA 1ST 15 MINUTES: Performed by: INTERNAL MEDICINE

## 2017-10-10 PROCEDURE — 88305 TISSUE EXAM BY PATHOLOGIST: CPT | Performed by: PATHOLOGY

## 2017-10-10 PROCEDURE — 37000009 HC ANESTHESIA EA ADD 15 MINS: Performed by: INTERNAL MEDICINE

## 2017-10-10 PROCEDURE — D9220A PRA ANESTHESIA: Mod: CRNA,,, | Performed by: NURSE ANESTHETIST, CERTIFIED REGISTERED

## 2017-10-10 RX ORDER — LIDOCAINE HYDROCHLORIDE 20 MG/ML
INJECTION, SOLUTION EPIDURAL; INFILTRATION; INTRACAUDAL; PERINEURAL
Status: DISCONTINUED
Start: 2017-10-10 | End: 2017-10-10 | Stop reason: HOSPADM

## 2017-10-10 RX ORDER — PROPOFOL 10 MG/ML
INJECTION, EMULSION INTRAVENOUS
Status: DISCONTINUED
Start: 2017-10-10 | End: 2017-10-10 | Stop reason: HOSPADM

## 2017-10-10 RX ORDER — NAPROXEN SODIUM 220 MG
220 TABLET ORAL 2 TIMES DAILY PRN
COMMUNITY
End: 2018-02-19

## 2017-10-10 RX ORDER — LIDOCAINE HCL/PF 100 MG/5ML
SYRINGE (ML) INTRAVENOUS
Status: DISCONTINUED | OUTPATIENT
Start: 2017-10-10 | End: 2017-10-10

## 2017-10-10 RX ORDER — PANTOPRAZOLE SODIUM 40 MG/1
40 TABLET, DELAYED RELEASE ORAL 2 TIMES DAILY
Qty: 60 TABLET | Refills: 3 | Status: SHIPPED | OUTPATIENT
Start: 2017-10-10 | End: 2018-08-20

## 2017-10-10 RX ORDER — PROPOFOL 10 MG/ML
VIAL (ML) INTRAVENOUS
Status: DISCONTINUED | OUTPATIENT
Start: 2017-10-10 | End: 2017-10-10

## 2017-10-10 RX ORDER — SODIUM CHLORIDE 9 MG/ML
INJECTION, SOLUTION INTRAVENOUS CONTINUOUS
Status: DISCONTINUED | OUTPATIENT
Start: 2017-10-10 | End: 2017-10-10 | Stop reason: HOSPADM

## 2017-10-10 RX ADMIN — PROPOFOL 50 MG: 10 INJECTION, EMULSION INTRAVENOUS at 09:10

## 2017-10-10 RX ADMIN — LIDOCAINE HYDROCHLORIDE 100 MG: 20 INJECTION, SOLUTION INTRAVENOUS at 08:10

## 2017-10-10 RX ADMIN — SODIUM CHLORIDE 1000 ML: 0.9 INJECTION, SOLUTION INTRAVENOUS at 07:10

## 2017-10-10 RX ADMIN — PROPOFOL 100 MG: 10 INJECTION, EMULSION INTRAVENOUS at 08:10

## 2017-10-10 NOTE — TRANSFER OF CARE
Anesthesia Transfer of Care Note    Patient: Efraín Anton    Procedure(s) Performed: Procedure(s) (LRB):  ESOPHAGOGASTRODUODENOSCOPY (EGD) (N/A)    Patient location: PACU    Anesthesia Type: general    Transport from OR: Transported from OR on room air with adequate spontaneous ventilation    Post pain: adequate analgesia    Post assessment: tolerated procedure well and no apparent anesthetic complications    Post vital signs: stable    Level of consciousness: sedated    Nausea/Vomiting: no nausea/vomiting    Transfer of care protocol was followed      Last vitals:   Visit Vitals  BP (!) 140/80   Pulse 84   Resp (!) 22   SpO2 (!) 91%

## 2017-10-10 NOTE — H&P
[facility] Note    CC: Dypshagia    HPI 67 y.o. male presents with chronic, intermittent, solid food dysphagia (meat, rice) x several years, worse over the last several weeks, not associated with odynophagia or weight loss. Over the past 4-5 years he has had food bolus impaction three times requiring emergent endoscopy at [facility], the last of which was 1 year ago and at that time he had esophageal dilation (records not currently available). His dysphagia resolved after dilation until recently. He denies heartburn and has a history of tobacco use (quit >30 years ago). Other than chronic shoulder pain he feels well.     Past Medical History:   Diagnosis Date    Arthritis     Eczema     Hypertension     Tinnitus, objective          Review of Systems  General ROS: negative for - chills, fever or weight loss  Cardiovascular ROS: no chest pain or dyspnea on exertion  Gastrointestinal ROS: + dysphagia to solids, no abdominal pain, no hematemesis    Physical Examination  There were no vitals taken for this visit.  General appearance: alert, cooperative, no distress  HENT: Normocephalic, atraumatic, neck symmetrical, no nasal discharge, sclera anicteric   Lungs: clear to auscultation bilaterally, symmetric chest wall expansion bilaterally  Heart: regular rate and rhythm without rub; no displacement of the PMI   Abdomen: soft, nontender, nondistended  Extremities: extremities symmetric; no clubbing, cyanosis, or edema        Labs:  Lab Results   Component Value Date    WBC 7.43 07/18/2016    HGB 14.2 07/18/2016    HCT 41.8 07/18/2016    MCV 94 07/18/2016     07/18/2016         Assessment:   67 y.o. male presents with intermittent solid food dysphagia and history of food bolus impaction, as well as esophageal dilation    Plan:  -Proceed to EGD with possible dilation    Xi Acosta MD  [facility]  [address], [address]  [place] [address]  Office: [phone]  Fax:  [phone]

## 2017-10-10 NOTE — ANESTHESIA PREPROCEDURE EVALUATION
10/10/2017  Efraín Anton is a 67 y.o., male.    Anesthesia Evaluation    I have reviewed the Patient Summary Reports.    I have reviewed the Nursing Notes.   I have reviewed the Medications.     Review of Systems  Anesthesia Hx:  No problems with previous Anesthesia    Social:  Non-Smoker    Cardiovascular:   Hypertension    Pulmonary:  Pulmonary Normal    Renal/:  Renal/ Normal     Musculoskeletal:   Arthritis     Neurological:  Neurology Normal    Endocrine:  Endocrine Normal        Physical Exam  General:  Well nourished    Airway/Jaw/Neck:  Airway Findings: Mouth Opening: Normal Tongue: Normal  General Airway Assessment: Adult  Oropharynx Findings:  Mallampati: II  Jaw/Neck Findings:  Neck ROM: Normal ROM     Eyes/Ears/Nose:  Eyes/Ears/Nose Findings:    Dental:  Dental Findings:   Chest/Lungs:  Chest/Lungs Findings: Clear to auscultation, Normal Respiratory Rate     Heart/Vascular:  Heart Findings: Rate: Normal  Rhythm: Regular Rhythm        Mental Status:  Mental Status Findings:  Cooperative, Alert and Oriented         Anesthesia Plan  Type of Anesthesia, risks & benefits discussed:  Anesthesia Type:  general  Patient's Preference:   Intra-op Monitoring Plan:   Intra-op Monitoring Plan Comments:   Post Op Pain Control Plan: multimodal analgesia  Post Op Pain Control Plan Comments:   Induction:   IV  Beta Blocker:  Patient is not currently on a Beta-Blocker (No further documentation required).       Informed Consent: Patient understands risks and agrees with Anesthesia plan.  Questions answered. Anesthesia consent signed with patient.  ASA Score: 2     Day of Surgery Review of History & Physical:  There are no significant changes.   H&P completed by Anesthesiologist.       Ready For Surgery From Anesthesia Perspective.

## 2017-10-10 NOTE — PLAN OF CARE
Dr Acosta saw patient 20 minutes ago. He was complaining of difficulty swallowing. She wants to see him again prior to discharge. His ride Mima had already been called at [phone].

## 2017-10-10 NOTE — PROGRESS NOTES
Dr Acosta saw patient again. She wants him to stay for another 30 minutes for observation. He is going to try to swallow jello/ pudding.

## 2017-10-11 VITALS
SYSTOLIC BLOOD PRESSURE: 148 MMHG | DIASTOLIC BLOOD PRESSURE: 90 MMHG | HEART RATE: 54 BPM | RESPIRATION RATE: 19 BRPM | OXYGEN SATURATION: 98 %

## 2017-10-11 NOTE — OR NURSING
During post op phone call, pt stated that he was having discomfort swallowing food and liquids.  Pt states that he feels as if his condition is greatly improved from yesterday after procedure, and anticipates continued relief.  Advised to continue with soft foods and liquids, and if he is unable to tolerate adequate amounts of food and liquids, or if pain increases to call back to Endoscopy unit or Dr Acosta directly.

## 2017-10-19 ENCOUNTER — DOCUMENTATION ONLY (OUTPATIENT)
Dept: FAMILY MEDICINE | Facility: CLINIC | Age: 67
End: 2017-10-19

## 2017-10-19 NOTE — PROGRESS NOTES
Pre-Visit Chart Review  For Appointment Scheduled on 10/20/2017      Health Maintenance Due   Topic Date Due    Zoster Vaccine  03/31/2008    Colonoscopy  11/01/2014    Influenza Vaccine  08/01/2017

## 2017-10-20 ENCOUNTER — OFFICE VISIT (OUTPATIENT)
Dept: FAMILY MEDICINE | Facility: CLINIC | Age: 67
End: 2017-10-20
Payer: MEDICARE

## 2017-10-20 VITALS
HEIGHT: 69 IN | DIASTOLIC BLOOD PRESSURE: 85 MMHG | TEMPERATURE: 98 F | WEIGHT: 197.31 LBS | SYSTOLIC BLOOD PRESSURE: 155 MMHG | HEART RATE: 72 BPM | BODY MASS INDEX: 29.22 KG/M2

## 2017-10-20 DIAGNOSIS — Z00.00 ENCOUNTER FOR PREVENTIVE HEALTH EXAMINATION: Primary | ICD-10-CM

## 2017-10-20 DIAGNOSIS — H93.11 TINNITUS OF RIGHT EAR: ICD-10-CM

## 2017-10-20 DIAGNOSIS — M19.012 PRIMARY OSTEOARTHRITIS OF BOTH SHOULDERS: ICD-10-CM

## 2017-10-20 DIAGNOSIS — M19.011 PRIMARY OSTEOARTHRITIS OF BOTH SHOULDERS: ICD-10-CM

## 2017-10-20 DIAGNOSIS — I10 ESSENTIAL HYPERTENSION: ICD-10-CM

## 2017-10-20 DIAGNOSIS — K21.00 GASTROESOPHAGEAL REFLUX DISEASE WITH ESOPHAGITIS: ICD-10-CM

## 2017-10-20 DIAGNOSIS — Z23 FLU VACCINE NEED: ICD-10-CM

## 2017-10-20 PROBLEM — R13.10 DYSPHAGIA: Status: RESOLVED | Noted: 2017-10-10 | Resolved: 2017-10-20

## 2017-10-20 PROBLEM — K20.90 ESOPHAGITIS: Status: RESOLVED | Noted: 2017-10-10 | Resolved: 2017-10-20

## 2017-10-20 PROCEDURE — 99999 PR PBB SHADOW E&M-EST. PATIENT-LVL III: CPT | Mod: PBBFAC,,, | Performed by: PHYSICIAN ASSISTANT

## 2017-10-20 PROCEDURE — G0008 ADMIN INFLUENZA VIRUS VAC: HCPCS | Mod: S$GLB,,, | Performed by: PHYSICIAN ASSISTANT

## 2017-10-20 PROCEDURE — 90662 IIV NO PRSV INCREASED AG IM: CPT | Mod: S$GLB,,, | Performed by: PHYSICIAN ASSISTANT

## 2017-10-20 PROCEDURE — 99499 UNLISTED E&M SERVICE: CPT | Mod: S$GLB,,, | Performed by: PHYSICIAN ASSISTANT

## 2017-10-20 PROCEDURE — G0439 PPPS, SUBSEQ VISIT: HCPCS | Mod: S$GLB,,, | Performed by: PHYSICIAN ASSISTANT

## 2017-10-20 NOTE — PROGRESS NOTES
"Efraín Anton presented for a  Medicare AWV and comprehensive Health Risk Assessment today. The following components were reviewed and updated:    · Medical history  · Family History  · Social history  · Allergies and Current Medications  · Health Risk Assessment  · Health Maintenance  · Care Team     ** See Completed Assessments for Annual Wellness Visit within the encounter summary.**       The following assessments were completed:  · Living Situation  · CAGE  · Depression Screening  · Timed Get Up and Go  · Whisper Test  · Cognitive Function Screening  · Nutrition Screening  · ADL Screening  · PAQ Screening    Vitals:    10/20/17 0751   BP: (!) 155/85   BP Location: Right arm   Patient Position: Sitting   BP Method: Large (Automatic)   Pulse: 72   Temp: 97.5 °F (36.4 °C)   TempSrc: Oral   Weight: 89.5 kg (197 lb 5 oz)   Height: 5' 9" (1.753 m)     Body mass index is 29.14 kg/m².  Physical Exam   Constitutional: He is oriented to person, place, and time. He appears well-developed and well-nourished. No distress.   HENT:   Head: Normocephalic and atraumatic.   Eyes: Conjunctivae are normal. Pupils are equal, round, and reactive to light.   Neck: Normal range of motion. Neck supple. No JVD present. No thyromegaly present.   Cardiovascular: Normal rate and regular rhythm.  Exam reveals no gallop and no friction rub.    No murmur heard.  Pulmonary/Chest: Effort normal. No respiratory distress. He has no wheezes. He has no rales. He exhibits no tenderness.   Neurological: He is alert and oriented to person, place, and time.   Skin: He is not diaphoretic.               Diagnoses and health risks identified today and associated recommendations/orders:    Efraín HAYNES was seen today for health risk assessment.    Diagnoses and all orders for this visit:    Encounter for preventive health examination    Essential hypertension  Comments:  elevated BP this AM, continue lisinopril and continue to monitor    Tinnitus of right " ear  Comments:  controlled    Primary osteoarthritis of both shoulders  Comments:  stable    Gastroesophageal reflux disease with esophagitis  Comments:  controlled, continue protonix    Flu vaccine need  -     Influenza - High Dose (65+) (PF) (IM)        Provided Efraín HAYNES with a 5-10 year written screening schedule and personal prevention plan. Recommendations were developed using the USPSTF age appropriate recommendations. Education, counseling, and referrals were provided as needed. After Visit Summary printed and given to patient which includes a list of additional screenings\tests needed.    No Follow-up on file.    MARGARET Archibald     Patient readiness: acceptance and barriers:none    During the course of the visit the patient was educated and counseled about the following:     Hypertension:   Regular aerobic exercise.    Goals: Hypertension: Reduce Blood Pressure    Did patient meet goals/outcomes: No    The following self management tools provided: blood pressure log    Patient Instructions (the written plan) was given to the patient/family.     Time spent with patient: 55 minutes

## 2017-10-20 NOTE — PATIENT INSTRUCTIONS
Controlling High Blood Pressure  High blood pressure (hypertension) is often called the silent killer. This is because many people who have it dont know it. High blood pressure is defined as 140/90 mm Hg or higher. Know your blood pressure and remember to check it regularly. Doing so can save your life. Here are some things you can do to help control your blood pressure.    Choose heart-healthy foods  · Select low-salt, low-fat foods. Limit sodium intake to 2,400 mg per day or the amount suggested by your healthcare provider.  · Limit canned, dried, cured, packaged, and fast foods. These can contain a lot of salt.  · Eat 8 to 10 servings of fruits and vegetables every day.  · Choose lean meats, fish, or chicken.  · Eat whole-grain pasta, brown rice, and beans.  · Eat 2 to 3 servings of low-fat or fat-free dairy products.  · Ask your doctor about the DASH eating plan. This plan helps reduce blood pressure.  · When you go to a restaurant, ask that your meal be prepared with no added salt.  Maintain a healthy weight  · Ask your healthcare provider how many calories to eat a day. Then stick to that number.  · Ask your healthcare provider what weight range is healthiest for you. If you are overweight, a weight loss of only 3% to 5% of your body weight can help lower blood pressure. Generally, a good weight loss goal is to lose 10% of your body weight in a year.  · Limit snacks and sweets.  · Get regular exercise.  Get up and get active  · Choose activities you enjoy. Find ones you can do with friends or family. This includes bicycling, dancing, walking, and jogging.  · Park farther away from building entrances.  · Use stairs instead of the elevator.  · When you can, walk or bike instead of driving.  · [place] leaves, garden, or do household repairs.  · Be active at a moderate to vigorous level of physical activity for at least 40 minutes for a minimum of 3 to 4 days a week.   Manage stress  · Make time to relax and enjoy  life. Find time to laugh.  · Communicate your concerns with your loved ones and your healthcare provider.  · Visit with family and friends, and keep up with hobbies.  Limit alcohol and quit smoking  · Men should have no more than 2 drinks per day.  · Women should have no more than 1 drink per day.  · Talk with your healthcare provider about quitting smoking. Smoking significantly increases your risk for heart disease and stroke. Ask your healthcare provider about community smoking cessation programs and other options.  Medicines  If lifestyle changes arent enough, your healthcare provider may prescribe high blood pressure medicine. Take all medicines as prescribed. If you have any questions about your medicines, ask your healthcare provider before stopping or changing them.   Date Last Reviewed: 4/27/2016  © 9957-8021 [employer]. [address] [place] [address], [place] [address]. All rights reserved. This information is not intended as a substitute for professional medical care. Always follow your healthcare professional's instructions.          Counseling and Referral of Other Preventative  (Italic type indicates deductible and co-insurance are waived)    Patient Name: Efraín Anton  Today's Date: 10/20/2017      SERVICE LIMITATIONS RECOMMENDATION    Vaccines    · Pneumococcal (once after 65)    · Influenza (annually)    · Hepatitis B (if medium/high risk)    · Prevnar 13      Hepatitis B medium/high risk factors:       - End-stage renal disease       - Hemophiliacs who received Factor VII or         IX concentrates       - Clients of institutions for the mentally             retarded       - Persons who live in the same house as          a HepB carrier       - Homosexual men       - Illicit injectable drug abusers     Pneumococcal: Done, no repeat necessary     Influenza: Done, repeat in one year     Hepatitis B: N/A     Prevnar 13: N/A    Prostate cancer screening (annually to age 75)     Prostate  specific antigen (PSA) Shared decision making with Provider. Sometimes a co-pay may be required if the patient decides to have this test. The USPSTF no longer recommends prostate cancer screening routinely in medicine: not to follow    Colorectal cancer screening (to age 75)    · Fecal occult blood test (annual)  · Flexible sigmoidoscopy (5y)  · Screening colonoscopy (10y)  · Barium enema   Last done 11/1/11, recommend to repeat every 3  years    Diabetes self-management training (no USPSTF recommendations)  Requires referral by treating physician for patient with diabetes or renal disease. 10 hours of initial DSMT sessions of no less than 30 minutes each in a continuous 12-month period. 2 hours of follow-up DSMT in subsequent years.  N/A    Glaucoma screening (no USPSTF recommendation)  Diabetes mellitus, family history   , age 50 or over    American, age 65 or over  N/A    Medical nutrition therapy for diabetes or renal disease (no recommended schedule)  Requires referral by treating physician for patient with diabetes or renal disease or kidney transplant within the past 3 years.  Can be provided in same year as diabetes self-management training (DSMT), and CMS recommends medical nutrition therapy take place after DSMT. Up to 3 hours for initial year and 2 hours in subsequent years.  N/A    Cardiovascular screening blood tests (every 5 years)  · Fasting lipid panel  Order as a panel if possible  Last done 2/16/2017, recommend to repeat every 5  years    Diabetes screening tests (at least every 3 years, Medicare covers annually or at 6-month intervals for prediabetic patients)  · Fasting blood sugar (FBS) or glucose tolerance test (GTT)  Patient must be diagnosed with one of the following:       - Hypertension       - Dyslipidemia       - Obesity (BMI 30kg/m2)       - Previous elevated impaired FBS or GTT       ... or any two of the following:       - Overweight (BMI 25 but <30)       -  Family history of diabetes       - Age 65 or older       - History of gestational diabetes or birth of baby weighing more than 9 pounds  N/A    Abdominal aortic aneurysm screening (once)  · Sonogram   Limited to patients who meet one of the following criteria:       - Men who are 65-75 years old and have smoked more than 100 cigarette in their lifetime       - Anyone with a family history of abdominal aortic aneurysm       - Anyone recommended for screening by the USPSTF  Done, no repeat necessary    HIV screening (annually for increased risk patients)  · HIV-1 and HIV-2 by EIA, or GALE, rapid antibody test or oral mucosa transudate  Patients must be at increased risk for HIV infection per USPSTF guidelines or pregnant. Tests covered annually for patient at increased risk or as requested by the patient. Pregnant patients may receive up to 3 tests during pregnancy.  Risks discussed, screening is not recommended    Smoking cessation counseling (up to 8 sessions per year)  Patients must be asymptomatic of tobacco-related conditions to receive as a preventative service.  Non-smoker    Subsequent annual wellness visit  At least 12 months since last AWV  Return in one year     The following information is provided to all patients.  This information is to help you find resources for any of the problems found today that may be affecting your health:                Living healthy guide: www.[facility].[place].gov      Understanding Diabetes: www.diabetes.org      Eating healthy: www.cdc.gov/healthyweight      CDC home safety checklist: www.cdc.gov/steadi/patient.html      Agency on Aging: www.goea.[place].[facility]      Alcoholics anonymous (AA): www.aa.org      Physical Activity: www.loli.nih.gov/kd2xghn      Tobacco use: www.quitwithusla.org

## 2017-10-20 NOTE — Clinical Note
Primary Care Providers: Sandi Nix MD, MD (General)  Your patient was seen today for a HRA visit. Gap(s) in care (HEDIS gaps) have been identified during this visit that require additional testing and possible follow up.  Orders Placed This Encounter     Influenza - High Dose (65+) (PF) (IM)   These orders were placed using [facility] approved protocol and any results will be forwarded to your office for appropriate follow up. I have included a copy of my visit note; please review the note and feel free to contact me with any questions.   Thank you for allowing me to participate in the care of your patients. MARGARET Archibald

## 2017-10-23 ENCOUNTER — DOCUMENTATION ONLY (OUTPATIENT)
Dept: FAMILY MEDICINE | Facility: CLINIC | Age: 67
End: 2017-10-23

## 2017-10-23 NOTE — PROGRESS NOTES
Pre-Visit Chart Review  For Appointment Scheduled on 10/24/2017    Health Maintenance Due   Topic Date Due    Colonoscopy  11/01/2014

## 2017-10-24 ENCOUNTER — OFFICE VISIT (OUTPATIENT)
Dept: FAMILY MEDICINE | Facility: CLINIC | Age: 67
End: 2017-10-24
Payer: MEDICARE

## 2017-10-24 VITALS
BODY MASS INDEX: 28.77 KG/M2 | SYSTOLIC BLOOD PRESSURE: 130 MMHG | DIASTOLIC BLOOD PRESSURE: 82 MMHG | WEIGHT: 194.25 LBS | HEIGHT: 69 IN | TEMPERATURE: 98 F | HEART RATE: 59 BPM

## 2017-10-24 DIAGNOSIS — H66.001 ACUTE SUPPURATIVE OTITIS MEDIA OF RIGHT EAR WITHOUT SPONTANEOUS RUPTURE OF TYMPANIC MEMBRANE, RECURRENCE NOT SPECIFIED: Primary | ICD-10-CM

## 2017-10-24 DIAGNOSIS — I10 ESSENTIAL HYPERTENSION: ICD-10-CM

## 2017-10-24 PROCEDURE — 99499 UNLISTED E&M SERVICE: CPT | Mod: S$GLB,,, | Performed by: PHYSICIAN ASSISTANT

## 2017-10-24 PROCEDURE — 99999 PR PBB SHADOW E&M-EST. PATIENT-LVL IV: CPT | Mod: PBBFAC,,, | Performed by: PHYSICIAN ASSISTANT

## 2017-10-24 PROCEDURE — 99214 OFFICE O/P EST MOD 30 MIN: CPT | Mod: S$GLB,,, | Performed by: PHYSICIAN ASSISTANT

## 2017-10-24 RX ORDER — AMOXICILLIN 500 MG/1
500 TABLET, FILM COATED ORAL EVERY 12 HOURS
Qty: 20 TABLET | Refills: 0 | Status: SHIPPED | OUTPATIENT
Start: 2017-10-24 | End: 2017-11-03

## 2017-10-24 NOTE — PROGRESS NOTES
Subjective:       Patient ID: Efraín Anton is a 67 y.o. male.    Chief Complaint: Hearing Problem    Mr. Anton is a 67 year old male who presents to clinic for evaluation of right ear fullness and decreased hearing sensation. He is concerned about wax build up and he recently had cerumen removal in February of this year. He also reports about 1-2 weeks ago he noticed drainage from his ear onto his pillow at night; however, this has stopped. He denies otalgia, sore throat, sinus symptoms, cough, or fever/chills.       Review of Systems   Constitutional: Negative for chills and fever.   HENT: Positive for ear discharge and hearing loss. Negative for congestion, ear pain, postnasal drip, rhinorrhea, sinus pressure, sore throat and trouble swallowing.    Eyes: Negative.    Respiratory: Negative for apnea, cough, chest tightness, shortness of breath and wheezing.    Cardiovascular: Negative for chest pain, palpitations and leg swelling.   Gastrointestinal: Negative for abdominal pain, constipation, diarrhea, nausea and vomiting.   Musculoskeletal: Negative for myalgias.   Allergic/Immunologic: Negative for environmental allergies and immunocompromised state.   Neurological: Negative for dizziness, syncope and light-headedness.   Hematological: Negative for adenopathy.       Objective:      Vitals:    10/24/17 0835   BP: 130/82   Pulse:    Temp:      Physical Exam   Constitutional: He appears well-developed and well-nourished.   HENT:   Head: Normocephalic and atraumatic.   Right Ear: External ear and ear canal normal. There is drainage (purulent). Tympanic membrane is erythematous. Decreased hearing is noted.   Left Ear: Tympanic membrane, external ear and ear canal normal. Decreased hearing is noted.   Nose: Nose normal. Right sinus exhibits no maxillary sinus tenderness and no frontal sinus tenderness. Left sinus exhibits no maxillary sinus tenderness and no frontal sinus tenderness.   Mouth/Throat: Oropharynx is  clear and moist and mucous membranes are normal.   Eyes: Conjunctivae, EOM and lids are normal. Pupils are equal, round, and reactive to light.   Cardiovascular: Normal rate, regular rhythm, normal heart sounds and intact distal pulses.    Pulmonary/Chest: Effort normal and breath sounds normal.   Lymphadenopathy:     He has no cervical adenopathy.   Skin: Skin is warm and dry.       Assessment:       1. Acute suppurative otitis media of right ear without spontaneous rupture of tympanic membrane, recurrence not specified    2. Essential hypertension        Plan:       Acute suppurative otitis media of right ear without spontaneous rupture of tympanic membrane, recurrence not specified  -     amoxicillin (AMOXIL) 500 MG Tab; Take 1 tablet (500 mg total) by mouth every 12 (twelve) hours.  Dispense: 20 tablet; Refill: 0  - Take antibiotics with food.  Increase fluid intake.  Call the clinic if symptoms worsen, new symptoms develop or if you are not any better after completion of your antibiotics.    - Follow up in 1 week for re-evaluation    Essential hypertension        - Controlled, continue current medication    Patient readiness: acceptance and barriers:none    During the course of the visit the patient was educated and counseled about the following:     Hypertension:   Medication: no change.    Goals: Hypertension: Reduce Blood Pressure    Did patient meet goals/outcomes: No    The following self management tools provided: declined    Patient Instructions (the written plan) was given to the patient/family.     Time spent with patient: 15 minutes

## 2017-10-24 NOTE — PATIENT INSTRUCTIONS
Controlling High Blood Pressure  High blood pressure (hypertension) is often called the silent killer. This is because many people who have it dont know it. High blood pressure is defined as 140/90 mm Hg or higher. Know your blood pressure and remember to check it regularly. Doing so can save your life. Here are some things you can do to help control your blood pressure.    Choose heart-healthy foods  · Select low-salt, low-fat foods. Limit sodium intake to 2,400 mg per day or the amount suggested by your healthcare provider.  · Limit canned, dried, cured, packaged, and fast foods. These can contain a lot of salt.  · Eat 8 to 10 servings of fruits and vegetables every day.  · Choose lean meats, fish, or chicken.  · Eat whole-grain pasta, brown rice, and beans.  · Eat 2 to 3 servings of low-fat or fat-free dairy products.  · Ask your doctor about the DASH eating plan. This plan helps reduce blood pressure.  · When you go to a restaurant, ask that your meal be prepared with no added salt.  Maintain a healthy weight  · Ask your healthcare provider how many calories to eat a day. Then stick to that number.  · Ask your healthcare provider what weight range is healthiest for you. If you are overweight, a weight loss of only 3% to 5% of your body weight can help lower blood pressure. Generally, a good weight loss goal is to lose 10% of your body weight in a year.  · Limit snacks and sweets.  · Get regular exercise.  Get up and get active  · Choose activities you enjoy. Find ones you can do with friends or family. This includes bicycling, dancing, walking, and jogging.  · Park farther away from building entrances.  · Use stairs instead of the elevator.  · When you can, walk or bike instead of driving.  · [place] leaves, garden, or do household repairs.  · Be active at a moderate to vigorous level of physical activity for at least 40 minutes for a minimum of 3 to 4 days a week.   Manage stress  · Make time to relax and enjoy  life. Find time to laugh.  · Communicate your concerns with your loved ones and your healthcare provider.  · Visit with family and friends, and keep up with hobbies.  Limit alcohol and quit smoking  · Men should have no more than 2 drinks per day.  · Women should have no more than 1 drink per day.  · Talk with your healthcare provider about quitting smoking. Smoking significantly increases your risk for heart disease and stroke. Ask your healthcare provider about community smoking cessation programs and other options.  Medicines  If lifestyle changes arent enough, your healthcare provider may prescribe high blood pressure medicine. Take all medicines as prescribed. If you have any questions about your medicines, ask your healthcare provider before stopping or changing them.   Date Last Reviewed: 4/27/2016  © 2019-4699 [employer], [employer]. [address] [place] [address], [place] [address]. All rights reserved. This information is not intended as a substitute for professional medical care. Always follow your healthcare professional's instructions.

## 2017-10-27 ENCOUNTER — DOCUMENTATION ONLY (OUTPATIENT)
Dept: FAMILY MEDICINE | Facility: CLINIC | Age: 67
End: 2017-10-27

## 2017-10-27 NOTE — PROGRESS NOTES
Pre-Visit Chart Review  For Appointment Scheduled on 10/31/2017    Health Maintenance Due   Topic Date Due    Zoster Vaccine  03/31/2008    Colonoscopy  11/01/2014

## 2017-10-30 ENCOUNTER — TELEPHONE (OUTPATIENT)
Dept: GASTROENTEROLOGY | Facility: CLINIC | Age: 67
End: 2017-10-30

## 2017-10-30 ENCOUNTER — DOCUMENTATION ONLY (OUTPATIENT)
Dept: FAMILY MEDICINE | Facility: CLINIC | Age: 67
End: 2017-10-30

## 2017-10-30 NOTE — PROGRESS NOTES
Pre-Visit Chart Review  For Appointment Scheduled on10/31/17.    Health Maintenance Due   Topic Date Due    Zoster Vaccine  03/31/2008    Colonoscopy  11/01/2014

## 2017-10-30 NOTE — TELEPHONE ENCOUNTER
----- Message from Xi Acosta MD sent at 10/27/2017  1:57 PM CDT -----  Please let patient know his esophageal biopsies showed increased inflammatory cells. He should continue taking PPI BID as instructed at time of EGD And we need to schedule repeat EGD in mid-December to evaluate for healing/inflammation.

## 2017-10-30 NOTE — TELEPHONE ENCOUNTER
Path report given to pt.and will call later to schedule EGD in December. Will take protonix 2x daily.

## 2017-10-31 ENCOUNTER — OFFICE VISIT (OUTPATIENT)
Dept: FAMILY MEDICINE | Facility: CLINIC | Age: 67
End: 2017-10-31
Payer: MEDICARE

## 2017-10-31 VITALS
WEIGHT: 197.75 LBS | SYSTOLIC BLOOD PRESSURE: 140 MMHG | HEIGHT: 69 IN | TEMPERATURE: 98 F | DIASTOLIC BLOOD PRESSURE: 80 MMHG | HEART RATE: 62 BPM | BODY MASS INDEX: 29.29 KG/M2

## 2017-10-31 DIAGNOSIS — I10 ESSENTIAL HYPERTENSION: ICD-10-CM

## 2017-10-31 DIAGNOSIS — H93.11 TINNITUS OF RIGHT EAR: ICD-10-CM

## 2017-10-31 DIAGNOSIS — H72.91 TYMPANIC MEMBRANE RUPTURE, RIGHT: Primary | ICD-10-CM

## 2017-10-31 PROCEDURE — 99999 PR PBB SHADOW E&M-EST. PATIENT-LVL V: CPT | Mod: PBBFAC,,, | Performed by: PHYSICIAN ASSISTANT

## 2017-10-31 PROCEDURE — 99499 UNLISTED E&M SERVICE: CPT | Mod: S$GLB,,, | Performed by: PHYSICIAN ASSISTANT

## 2017-10-31 PROCEDURE — 99213 OFFICE O/P EST LOW 20 MIN: CPT | Mod: S$GLB,,, | Performed by: PHYSICIAN ASSISTANT

## 2017-10-31 NOTE — PATIENT INSTRUCTIONS
Controlling High Blood Pressure  High blood pressure (hypertension) is often called the silent killer. This is because many people who have it dont know it. High blood pressure is defined as 140/90 mm Hg or higher. Know your blood pressure and remember to check it regularly. Doing so can save your life. Here are some things you can do to help control your blood pressure.    Choose heart-healthy foods  · Select low-salt, low-fat foods. Limit sodium intake to 2,400 mg per day or the amount suggested by your healthcare provider.  · Limit canned, dried, cured, packaged, and fast foods. These can contain a lot of salt.  · Eat 8 to 10 servings of fruits and vegetables every day.  · Choose lean meats, fish, or chicken.  · Eat whole-grain pasta, brown rice, and beans.  · Eat 2 to 3 servings of low-fat or fat-free dairy products.  · Ask your doctor about the DASH eating plan. This plan helps reduce blood pressure.  · When you go to a restaurant, ask that your meal be prepared with no added salt.  Maintain a healthy weight  · Ask your healthcare provider how many calories to eat a day. Then stick to that number.  · Ask your healthcare provider what weight range is healthiest for you. If you are overweight, a weight loss of only 3% to 5% of your body weight can help lower blood pressure. Generally, a good weight loss goal is to lose 10% of your body weight in a year.  · Limit snacks and sweets.  · Get regular exercise.  Get up and get active  · Choose activities you enjoy. Find ones you can do with friends or family. This includes bicycling, dancing, walking, and jogging.  · Park farther away from building entrances.  · Use stairs instead of the elevator.  · When you can, walk or bike instead of driving.  · [place] leaves, garden, or do household repairs.  · Be active at a moderate to vigorous level of physical activity for at least 40 minutes for a minimum of 3 to 4 days a week.   Manage stress  · Make time to relax and enjoy  yes life. Find time to laugh.  · Communicate your concerns with your loved ones and your healthcare provider.  · Visit with family and friends, and keep up with hobbies.  Limit alcohol and quit smoking  · Men should have no more than 2 drinks per day.  · Women should have no more than 1 drink per day.  · Talk with your healthcare provider about quitting smoking. Smoking significantly increases your risk for heart disease and stroke. Ask your healthcare provider about community smoking cessation programs and other options.  Medicines  If lifestyle changes arent enough, your healthcare provider may prescribe high blood pressure medicine. Take all medicines as prescribed. If you have any questions about your medicines, ask your healthcare provider before stopping or changing them.   Date Last Reviewed: 4/27/2016  © 8563-4709 [employer], [employer]. [address] [place] [address], [place] [address]. All rights reserved. This information is not intended as a substitute for professional medical care. Always follow your healthcare professional's instructions.

## 2017-10-31 NOTE — PROGRESS NOTES
Subjective:       Patient ID: Efraín Anton is a 67 y.o. male.    Chief Complaint: Follow-up (otitis media)    Mr. Anton is a 67 year old male who presents to clinic for evaluation for follow up otitis media. Since last visit, he continues to have decreased hearing and tinnitus in the right ear; however, otalgia and discharge has resolved.  He denies fever or chills. He has known hearing loss and he is unsure if his hearing aids are working properly. BP is borderline elevated today in clinic. He has not been checking BP at home. He denies chest pain, shortness of breath, or lower ext edema. He is not sure if he is taking naproxen, although it is on his med list.       Review of Systems   Constitutional: Negative for chills and fever.   HENT: Positive for hearing loss and tinnitus. Negative for congestion, ear discharge, ear pain, postnasal drip, rhinorrhea, sinus pressure, sore throat and trouble swallowing.    Eyes: Negative.    Respiratory: Negative for apnea, cough, chest tightness, shortness of breath and wheezing.    Cardiovascular: Negative for chest pain, palpitations and leg swelling.   Gastrointestinal: Negative for abdominal pain, constipation, diarrhea, nausea and vomiting.   Musculoskeletal: Negative for myalgias.   Neurological: Negative for dizziness, syncope and light-headedness.   Hematological: Negative for adenopathy.       Objective:      Vitals:    10/31/17 0902   BP: (!) 140/80   Pulse:    Temp:      Physical Exam   Constitutional: He is oriented to person, place, and time. He appears well-developed and well-nourished.   HENT:   Head: Normocephalic and atraumatic.   Right Ear: External ear and ear canal normal. Tympanic membrane is perforated. Decreased hearing is noted.   Left Ear: Tympanic membrane, external ear and ear canal normal. Decreased hearing is noted.   Eyes: Conjunctivae and EOM are normal. Pupils are equal, round, and reactive to light.   Cardiovascular: Normal rate, regular  rhythm, normal heart sounds and intact distal pulses.    Pulmonary/Chest: Effort normal and breath sounds normal.   Neurological: He is alert and oriented to person, place, and time.   Skin: Skin is warm and dry.   Psychiatric: He has a normal mood and affect. His behavior is normal.       Assessment:       1. Tympanic membrane rupture, right    2. Tinnitus of right ear    3. Essential hypertension        Plan:       Tympanic membrane rupture, right  -     Ambulatory referral to ENT  -     Ambulatory referral to Audiology    Tinnitus of right ear  -     Ambulatory referral to ENT  -     Ambulatory referral to Audiology    Essential hypertension        - Uncontrolled, Monitor BP at home and keep a log. Notify clinic if BP is persistently >140/90. Nursing BP check in 2 weeks. If BP remains elevated then consider adding HCTZ at next visit.     Patient readiness: acceptance and barriers:none    During the course of the visit the patient was educated and counseled about the following:     Hypertension:   Dietary sodium restriction.  Check blood pressures daily and record.    Goals: Hypertension: Reduce Blood Pressure    Did patient meet goals/outcomes: No    The following self management tools provided: blood pressure log    Patient Instructions (the written plan) was given to the patient/family.     Time spent with patient: 15 minutes

## 2017-11-02 ENCOUNTER — CLINICAL SUPPORT (OUTPATIENT)
Dept: AUDIOLOGY | Facility: CLINIC | Age: 67
End: 2017-11-02
Payer: MEDICARE

## 2017-11-02 ENCOUNTER — OFFICE VISIT (OUTPATIENT)
Dept: OTOLARYNGOLOGY | Facility: CLINIC | Age: 67
End: 2017-11-02
Payer: MEDICARE

## 2017-11-02 VITALS
BODY MASS INDEX: 29.12 KG/M2 | DIASTOLIC BLOOD PRESSURE: 88 MMHG | HEART RATE: 82 BPM | WEIGHT: 196.63 LBS | HEIGHT: 69 IN | SYSTOLIC BLOOD PRESSURE: 156 MMHG

## 2017-11-02 DIAGNOSIS — H90.A22 SENSORINEURAL HEARING LOSS (SNHL) OF LEFT EAR WITH RESTRICTED HEARING OF RIGHT EAR: ICD-10-CM

## 2017-11-02 DIAGNOSIS — H90.A31 MIXED CONDUCTIVE AND SENSORINEURAL HEARING LOSS OF RIGHT EAR WITH RESTRICTED HEARING OF LEFT EAR: ICD-10-CM

## 2017-11-02 DIAGNOSIS — H93.293 IMPAIRED AUDITORY DISCRIMINATION, BILATERAL: ICD-10-CM

## 2017-11-02 DIAGNOSIS — H90.A32 MIXED CONDUCTIVE AND SENSORINEURAL HEARING LOSS OF LEFT EAR WITH RESTRICTED HEARING OF RIGHT EAR: Primary | ICD-10-CM

## 2017-11-02 DIAGNOSIS — H90.5 HIGH FREQUENCY SENSORINEURAL HEARING LOSS OF LEFT EAR: ICD-10-CM

## 2017-11-02 DIAGNOSIS — H93.13 TINNITUS, BILATERAL: ICD-10-CM

## 2017-11-02 DIAGNOSIS — H72.91 PERFORATION OF RIGHT TYMPANIC MEMBRANE: Primary | ICD-10-CM

## 2017-11-02 PROCEDURE — 99999 PR PBB SHADOW E&M-EST. PATIENT-LVL III: CPT | Mod: PBBFAC,,, | Performed by: NURSE PRACTITIONER

## 2017-11-02 PROCEDURE — 99070 SPECIAL SUPPLIES PHYS/QHP: CPT | Mod: S$GLB,,, | Performed by: NURSE PRACTITIONER

## 2017-11-02 PROCEDURE — 92567 TYMPANOMETRY: CPT | Mod: S$GLB,,, | Performed by: AUDIOLOGIST-HEARING AID FITTER

## 2017-11-02 PROCEDURE — 99203 OFFICE O/P NEW LOW 30 MIN: CPT | Mod: S$GLB,,, | Performed by: NURSE PRACTITIONER

## 2017-11-02 PROCEDURE — 92557 COMPREHENSIVE HEARING TEST: CPT | Mod: S$GLB,,, | Performed by: AUDIOLOGIST-HEARING AID FITTER

## 2017-11-02 NOTE — LETTER
November 2, 2017      Augustina Herrmann PA-C  [address] [facility] [place]  [facility] [address]           [facility]  [address] [facility] [address] [place] [address]  Phone: [phone]  Fax: [phone]          Patient: Efraín Anton   MR Number: [id number]   YOB: 1950   Date of Visit: 11/2/2017       Dear Augustina Herrmann:    Thank you for referring Efraín Anton to me for evaluation. Attached you will find relevant portions of my assessment and plan of care.    If you have questions, please do not hesitate to call me. I look forward to following Efraín Anton along with you.    Sincerely,    Bryanna Glasgow NP    Enclosure  CC:  No Recipients    If you would like to receive this communication electronically, please contact [email or web address] or [phone] to request more information on [facility] access.    For providers and/or their staff who would like to refer a patient to [facility], please contact us through our one-stop-shop provider referral line, [facility], at [phone].    If you feel you have received this communication in error or would no longer like to receive these types of communications, please e-mail [email or web address]

## 2017-11-02 NOTE — PROGRESS NOTES
Efraín Anton was seen 11/02/2017 for an audiological evaluation. Patient complains of hearing loss and constant tinnitus. Pt reports a strong Hx of noise exposure in the [employer] as a . [facility] but [facility] them.     Results reveal a moderate-to-severe mixed hearing loss for the right ear, and  mild-to-moderately severe  sensorineural hearing loss for the left ear.    Speech Reception Thresholds were  55 dBHL for the right ear and 35 dBHL for the left ear.    Word recognition scores were fair for the right ear and good for the left ear.   Tympanograms were Type B large volume for the right ear and Type A for the left ear.    Audiogram results were reviewed in detail with patient and all questions were answered. Results will be reviewed by ENT at the completion of this note. Recommend further medical eval for the asymmetry and abnormal tymp, continued daily use of binaural amplification, annual hearing tests to monitor hearing and hearing protection in loud noise.

## 2017-11-02 NOTE — PROGRESS NOTES
Subjective:       Patient ID: Efraín Anton is a 67 y.o. male.    Chief Complaint: Hearing Loss (rt ear) and Tinnitus (rt ear)    HPI   [facility], audiogram done and given hearing aids. One month ago right ear began draining again. Treated by JOSE Herrmann last week with antibiotics. He states he uses water to flush his ears out. He denies otalgia, but states he cannot hear as well AD since then started.     Review of Systems   Constitutional: Negative.    HENT: Positive for hearing loss.    Eyes: Negative.    Respiratory: Negative.    Cardiovascular: Negative.    Gastrointestinal: Negative.    Musculoskeletal: Negative.    Skin: Negative.    Neurological: Negative.    Hematological: Negative.    Psychiatric/Behavioral: Negative.        Objective:      Physical Exam   Constitutional: He is oriented to person, place, and time. Vital signs are normal. He appears well-developed and well-nourished. He is cooperative. He does not appear ill. No distress.   HENT:   Head: Normocephalic and atraumatic.   Right Ear: Hearing, external ear and ear canal normal. Tympanic membrane is perforated. Tympanic membrane is not erythematous. No middle ear effusion.   Left Ear: Hearing, tympanic membrane, external ear and ear canal normal. Tympanic membrane is not erythematous.  No middle ear effusion.   Nose: Nose normal. No mucosal edema or rhinorrhea. Right sinus exhibits no maxillary sinus tenderness and no frontal sinus tenderness. Left sinus exhibits no maxillary sinus tenderness and no frontal sinus tenderness.   Mouth/Throat: Uvula is midline, oropharynx is clear and moist and mucous membranes are normal. Mucous membranes are not pale, not dry and not cyanotic. No oral lesions. No oropharyngeal exudate, posterior oropharyngeal edema or posterior oropharyngeal erythema.   Eyes: Conjunctivae, EOM and lids are normal. Pupils are equal, round, and reactive to light.  Right eye exhibits no discharge. Left eye exhibits no discharge. No scleral icterus.   Neck: Trachea normal and normal range of motion. Neck supple. No tracheal deviation present. No thyroid mass and no thyromegaly present.   Cardiovascular: Normal rate.    Pulmonary/Chest: Effort normal. No stridor. No respiratory distress. He has no wheezes.   Musculoskeletal: Normal range of motion.   Lymphadenopathy:        Head (right side): No submental, no submandibular, no tonsillar, no preauricular and no posterior auricular adenopathy present.        Head (left side): No submental, no submandibular, no tonsillar, no preauricular and no posterior auricular adenopathy present.     He has no cervical adenopathy.        Right cervical: No superficial cervical and no posterior cervical adenopathy present.       Left cervical: No superficial cervical and no posterior cervical adenopathy present.   Neurological: He is alert and oriented to person, place, and time. He has normal strength. Coordination and gait normal.   Skin: Skin is warm, dry and intact. No lesion and no rash noted. He is not diaphoretic. No cyanosis. No pallor.   Psychiatric: He has a normal mood and affect. His speech is normal and behavior is normal. Judgment and thought content normal. Cognition and memory are normal.   Nursing note and vitals reviewed.      Assessment:     Right TM perforation    RIGHT: moderate sloping to severe mixed HL  LEFT: mild sloping to moderately-severe high-frequency SNHL  Plan:     NO water to right ear. XL Doc's Proplugs given.     W[facility])  Consider surgery consult for tympanoplasty with Dr. Nelson, though he would still need to wear his hearing aid.

## 2017-11-14 ENCOUNTER — CLINICAL SUPPORT (OUTPATIENT)
Dept: FAMILY MEDICINE | Facility: CLINIC | Age: 67
End: 2017-11-14
Payer: MEDICARE

## 2017-11-14 ENCOUNTER — TELEPHONE (OUTPATIENT)
Dept: FAMILY MEDICINE | Facility: CLINIC | Age: 67
End: 2017-11-14

## 2017-11-14 VITALS — DIASTOLIC BLOOD PRESSURE: 78 MMHG | SYSTOLIC BLOOD PRESSURE: 138 MMHG

## 2017-11-14 NOTE — PROGRESS NOTES
Patient here for a 2 week BP recheck after having and elevated reading at his last 10/31/2017. Today's BP reading was 138/78 taken manually. The patient brought his home BP log with him today for review.  This was given to Ms Herrmann for review.

## 2017-11-15 NOTE — TELEPHONE ENCOUNTER
BP log reviewed and 95% of the readings are within goal. Continue current medications and notify clinic if BP >140/90 consistently.

## 2017-11-16 NOTE — TELEPHONE ENCOUNTER
Patient notified that  reviewed and 95% of the readings are within goal. Continue current medications and notify clinic if BP >140/90 consistently. Patient verbalized understanding.

## 2018-02-04 DIAGNOSIS — I10 ESSENTIAL HYPERTENSION: ICD-10-CM

## 2018-02-05 RX ORDER — LISINOPRIL 10 MG/1
TABLET ORAL
Qty: 90 TABLET | Refills: 3 | Status: SHIPPED | OUTPATIENT
Start: 2018-02-05 | End: 2018-07-16 | Stop reason: SDUPTHER

## 2018-02-17 ENCOUNTER — DOCUMENTATION ONLY (OUTPATIENT)
Dept: FAMILY MEDICINE | Facility: CLINIC | Age: 68
End: 2018-02-17

## 2018-02-17 NOTE — PROGRESS NOTES
Pre-Visit Chart Review  For Appointment Scheduled on 2-19-18    Health Maintenance Due   Topic Date Due    Zoster Vaccine  03/31/2008    Colonoscopy  11/01/2014

## 2018-02-19 ENCOUNTER — LAB VISIT (OUTPATIENT)
Dept: LAB | Facility: HOSPITAL | Age: 68
End: 2018-02-19
Attending: FAMILY MEDICINE
Payer: MEDICARE

## 2018-02-19 ENCOUNTER — OFFICE VISIT (OUTPATIENT)
Dept: FAMILY MEDICINE | Facility: CLINIC | Age: 68
End: 2018-02-19
Payer: MEDICARE

## 2018-02-19 VITALS
HEART RATE: 73 BPM | WEIGHT: 201.94 LBS | DIASTOLIC BLOOD PRESSURE: 90 MMHG | SYSTOLIC BLOOD PRESSURE: 128 MMHG | HEIGHT: 69 IN | BODY MASS INDEX: 29.91 KG/M2 | TEMPERATURE: 98 F

## 2018-02-19 DIAGNOSIS — I10 ESSENTIAL HYPERTENSION: ICD-10-CM

## 2018-02-19 DIAGNOSIS — I10 ESSENTIAL HYPERTENSION: Primary | ICD-10-CM

## 2018-02-19 DIAGNOSIS — Z12.5 PROSTATE CANCER SCREENING: ICD-10-CM

## 2018-02-19 LAB
ALBUMIN SERPL BCP-MCNC: 3.8 G/DL
ALP SERPL-CCNC: 90 U/L
ALT SERPL W/O P-5'-P-CCNC: 17 U/L
ANION GAP SERPL CALC-SCNC: 9 MMOL/L
AST SERPL-CCNC: 17 U/L
BASOPHILS # BLD AUTO: 0.03 K/UL
BASOPHILS NFR BLD: 0.4 %
BILIRUB SERPL-MCNC: 0.7 MG/DL
BUN SERPL-MCNC: 23 MG/DL
CALCIUM SERPL-MCNC: 9.5 MG/DL
CHLORIDE SERPL-SCNC: 104 MMOL/L
CHOLEST SERPL-MCNC: 185 MG/DL
CHOLEST/HDLC SERPL: 4 {RATIO}
CO2 SERPL-SCNC: 27 MMOL/L
COMPLEXED PSA SERPL-MCNC: 2.8 NG/ML
CREAT SERPL-MCNC: 1.2 MG/DL
DIFFERENTIAL METHOD: NORMAL
EOSINOPHIL # BLD AUTO: 0.2 K/UL
EOSINOPHIL NFR BLD: 2.3 %
ERYTHROCYTE [DISTWIDTH] IN BLOOD BY AUTOMATED COUNT: 12.9 %
EST. GFR  (AFRICAN AMERICAN): >60 ML/MIN/1.73 M^2
EST. GFR  (NON AFRICAN AMERICAN): >60 ML/MIN/1.73 M^2
GLUCOSE SERPL-MCNC: 110 MG/DL
HCT VFR BLD AUTO: 44.4 %
HDLC SERPL-MCNC: 46 MG/DL
HDLC SERPL: 24.9 %
HGB BLD-MCNC: 14.6 G/DL
IMM GRANULOCYTES # BLD AUTO: 0.02 K/UL
IMM GRANULOCYTES NFR BLD AUTO: 0.3 %
LDLC SERPL CALC-MCNC: 118.6 MG/DL
LYMPHOCYTES # BLD AUTO: 3 K/UL
LYMPHOCYTES NFR BLD: 39.8 %
MCH RBC QN AUTO: 30.8 PG
MCHC RBC AUTO-ENTMCNC: 32.9 G/DL
MCV RBC AUTO: 94 FL
MONOCYTES # BLD AUTO: 0.8 K/UL
MONOCYTES NFR BLD: 11 %
NEUTROPHILS # BLD AUTO: 3.5 K/UL
NEUTROPHILS NFR BLD: 46.2 %
NONHDLC SERPL-MCNC: 139 MG/DL
NRBC BLD-RTO: 0 /100 WBC
PLATELET # BLD AUTO: 228 K/UL
PMV BLD AUTO: 10.2 FL
POTASSIUM SERPL-SCNC: 5.4 MMOL/L
PROT SERPL-MCNC: 7.1 G/DL
RBC # BLD AUTO: 4.74 M/UL
SODIUM SERPL-SCNC: 140 MMOL/L
TRIGL SERPL-MCNC: 102 MG/DL
WBC # BLD AUTO: 7.48 K/UL

## 2018-02-19 PROCEDURE — 85025 COMPLETE CBC W/AUTO DIFF WBC: CPT

## 2018-02-19 PROCEDURE — 36415 COLL VENOUS BLD VENIPUNCTURE: CPT | Mod: PO

## 2018-02-19 PROCEDURE — 99999 PR PBB SHADOW E&M-EST. PATIENT-LVL III: CPT | Mod: PBBFAC,,, | Performed by: FAMILY MEDICINE

## 2018-02-19 PROCEDURE — 99213 OFFICE O/P EST LOW 20 MIN: CPT | Mod: S$GLB,,, | Performed by: FAMILY MEDICINE

## 2018-02-19 PROCEDURE — 80053 COMPREHEN METABOLIC PANEL: CPT

## 2018-02-19 PROCEDURE — 80061 LIPID PANEL: CPT

## 2018-02-19 PROCEDURE — 84153 ASSAY OF PSA TOTAL: CPT

## 2018-02-19 PROCEDURE — 3008F BODY MASS INDEX DOCD: CPT | Mod: S$GLB,,, | Performed by: FAMILY MEDICINE

## 2018-02-19 PROCEDURE — 1159F MED LIST DOCD IN RCRD: CPT | Mod: S$GLB,,, | Performed by: FAMILY MEDICINE

## 2018-02-19 PROCEDURE — 99499 UNLISTED E&M SERVICE: CPT | Mod: S$GLB,,, | Performed by: FAMILY MEDICINE

## 2018-02-19 PROCEDURE — 1126F AMNT PAIN NOTED NONE PRSNT: CPT | Mod: S$GLB,,, | Performed by: FAMILY MEDICINE

## 2018-02-19 NOTE — PROGRESS NOTES
Subjective:       Patient ID: Efraín Anton is a 67 y.o. male.    Chief Complaint: Follow-up    Patient Active Problem List   Diagnosis    Hypertension    Primary osteoarthritis of both shoulders    Tinnitus of right ear    Eczema    Esophageal stenosis    Hiatal hernia    Gastroesophageal reflux disease with esophagitis   Had colonoscopy at [facility] 2 months ago-no polyps -scheduled for next surveillance 5 years  HPI  Review of Systems   Constitutional: Positive for unexpected weight change (4 lbs ). Negative for fatigue.   Respiratory: Negative for chest tightness and shortness of breath.    Cardiovascular: Negative for chest pain, palpitations and leg swelling.   Gastrointestinal: Negative for abdominal pain.   Musculoskeletal: Negative for arthralgias.   Neurological: Negative for dizziness, syncope, light-headedness and headaches.       Objective:      Physical Exam   Constitutional: He is oriented to person, place, and time. He appears well-developed and well-nourished.   Cardiovascular: Normal rate, regular rhythm and normal heart sounds.    Pulmonary/Chest: Effort normal and breath sounds normal.   Musculoskeletal: He exhibits no edema.   Neurological: He is alert and oriented to person, place, and time.   Skin: Skin is warm and dry.   Psychiatric: He has a normal mood and affect.   Nursing note and vitals reviewed.      Assessment:       1. Essential hypertension    2. Prostate cancer screening        Plan:       1. Essential hypertension  Borderline control. I counseled the patient on HTN education, management and recommendations.  I recommended weight loss toward a BMI < 25, avoidance of salt and the DASH diet, regular cardio exercise a minimum of 150 minutes per week and medications if indicated.  Printed materials were given.  Cont current meds  - Comprehensive metabolic panel; Future  - CBC auto differential; Future  - Lipid panel; Future    2. Prostate cancer screening  Screen and treat as  indicated:    - PSA, Screening; Future    PCM goal documentation:  Patient readiness: acceptance and barriers:readiness  During the course of the visit the patient was educated and counseled about the following: Hypertension:   Dietary sodium restriction.  Regular aerobic exercise.  Goals: Hypertension: Reduce Blood Pressure  Goal/Outcomes met:Hypertension  The following self management tools provided:none  Patient Instructions (the written plan) was given to the patient/family: Yes  Time spent with patient: 20 minutes    Patient with be reevaluated in 6 months or sooner prn    Greater than 50% of this visit was spent counseling as described in above documentation:Yes

## 2018-02-21 DIAGNOSIS — E87.5 HYPERKALEMIA: Primary | ICD-10-CM

## 2018-06-07 ENCOUNTER — PES CALL (OUTPATIENT)
Dept: ADMINISTRATIVE | Facility: CLINIC | Age: 68
End: 2018-06-07

## 2018-07-16 DIAGNOSIS — I10 ESSENTIAL HYPERTENSION: ICD-10-CM

## 2018-07-16 RX ORDER — LISINOPRIL 10 MG/1
10 TABLET ORAL DAILY
Qty: 90 TABLET | Refills: 3 | Status: SHIPPED | OUTPATIENT
Start: 2018-07-16 | End: 2018-08-20

## 2018-07-16 RX ORDER — TRIAMCINOLONE ACETONIDE 1 MG/G
CREAM TOPICAL 2 TIMES DAILY
Qty: 80 G | Refills: 0 | Status: SHIPPED | OUTPATIENT
Start: 2018-07-16 | End: 2024-02-19

## 2018-08-10 ENCOUNTER — PES CALL (OUTPATIENT)
Dept: ADMINISTRATIVE | Facility: CLINIC | Age: 68
End: 2018-08-10

## 2018-08-17 ENCOUNTER — DOCUMENTATION ONLY (OUTPATIENT)
Dept: FAMILY MEDICINE | Facility: CLINIC | Age: 68
End: 2018-08-17

## 2018-08-17 NOTE — PROGRESS NOTES
Pre-Visit Chart Review  For Appointment Scheduled on 08/20/2018    Health Maintenance Due   Topic Date Due    Zoster Vaccine  03/31/2008    Colonoscopy  11/01/2016    Influenza Vaccine  08/01/2018

## 2018-08-20 ENCOUNTER — OFFICE VISIT (OUTPATIENT)
Dept: FAMILY MEDICINE | Facility: CLINIC | Age: 68
End: 2018-08-20
Payer: MEDICARE

## 2018-08-20 VITALS
TEMPERATURE: 98 F | DIASTOLIC BLOOD PRESSURE: 73 MMHG | WEIGHT: 197.06 LBS | HEART RATE: 76 BPM | OXYGEN SATURATION: 98 % | WEIGHT: 197.06 LBS | TEMPERATURE: 98 F | HEIGHT: 69 IN | HEART RATE: 76 BPM | BODY MASS INDEX: 29.19 KG/M2 | RESPIRATION RATE: 12 BRPM | HEIGHT: 69 IN | BODY MASS INDEX: 29.19 KG/M2 | SYSTOLIC BLOOD PRESSURE: 102 MMHG | DIASTOLIC BLOOD PRESSURE: 73 MMHG | SYSTOLIC BLOOD PRESSURE: 102 MMHG

## 2018-08-20 DIAGNOSIS — C61 PROSTATE CANCER: ICD-10-CM

## 2018-08-20 DIAGNOSIS — L30.9 ECZEMA, UNSPECIFIED TYPE: ICD-10-CM

## 2018-08-20 DIAGNOSIS — I10 ESSENTIAL HYPERTENSION: Primary | ICD-10-CM

## 2018-08-20 DIAGNOSIS — M19.011 PRIMARY OSTEOARTHRITIS OF BOTH SHOULDERS: ICD-10-CM

## 2018-08-20 DIAGNOSIS — Z00.00 ENCOUNTER FOR PREVENTIVE HEALTH EXAMINATION: Primary | ICD-10-CM

## 2018-08-20 DIAGNOSIS — M19.012 PRIMARY OSTEOARTHRITIS OF BOTH SHOULDERS: ICD-10-CM

## 2018-08-20 DIAGNOSIS — H53.8 BLURRY VISION, BILATERAL: ICD-10-CM

## 2018-08-20 DIAGNOSIS — K21.00 GASTROESOPHAGEAL REFLUX DISEASE WITH ESOPHAGITIS: ICD-10-CM

## 2018-08-20 DIAGNOSIS — K44.9 HIATAL HERNIA: ICD-10-CM

## 2018-08-20 DIAGNOSIS — I10 ESSENTIAL HYPERTENSION: ICD-10-CM

## 2018-08-20 PROCEDURE — 99999 PR PBB SHADOW E&M-EST. PATIENT-LVL III: CPT | Mod: PBBFAC,,, | Performed by: PHYSICIAN ASSISTANT

## 2018-08-20 PROCEDURE — 99213 OFFICE O/P EST LOW 20 MIN: CPT | Mod: S$GLB,,, | Performed by: FAMILY MEDICINE

## 2018-08-20 PROCEDURE — 3078F DIAST BP <80 MM HG: CPT | Mod: CPTII,S$GLB,, | Performed by: FAMILY MEDICINE

## 2018-08-20 PROCEDURE — 3074F SYST BP LT 130 MM HG: CPT | Mod: CPTII,S$GLB,, | Performed by: FAMILY MEDICINE

## 2018-08-20 PROCEDURE — 99999 PR PBB SHADOW E&M-EST. PATIENT-LVL IV: CPT | Mod: PBBFAC,,, | Performed by: FAMILY MEDICINE

## 2018-08-20 PROCEDURE — 99499 UNLISTED E&M SERVICE: CPT | Mod: S$GLB,,, | Performed by: PHYSICIAN ASSISTANT

## 2018-08-20 PROCEDURE — 3074F SYST BP LT 130 MM HG: CPT | Mod: CPTII,S$GLB,, | Performed by: PHYSICIAN ASSISTANT

## 2018-08-20 PROCEDURE — 3078F DIAST BP <80 MM HG: CPT | Mod: CPTII,S$GLB,, | Performed by: PHYSICIAN ASSISTANT

## 2018-08-20 PROCEDURE — G0439 PPPS, SUBSEQ VISIT: HCPCS | Mod: S$GLB,,, | Performed by: PHYSICIAN ASSISTANT

## 2018-08-20 RX ORDER — LISINOPRIL 5 MG/1
5 TABLET ORAL DAILY
Qty: 90 TABLET | Refills: 3 | Status: SHIPPED | OUTPATIENT
Start: 2018-08-20 | End: 2018-12-28 | Stop reason: SDUPTHER

## 2018-08-20 NOTE — PATIENT INSTRUCTIONS
Counseling and Referral of Other Preventative  (Italic type indicates deductible and co-insurance are waived)    Patient Name: Efraín Anton  Today's Date: 8/20/2018    Health Maintenance       Date Due Completion Date    Colonoscopy 11/01/2016 11/1/2011 (Done)    Override on 11/1/2011: [facility] [place] NATIVIDAD Arguelles--in media)    Influenza Vaccine 08/01/2018 10/20/2017    Zoster Vaccine 08/02/2019 (Originally 3/31/2008) ---    Lipid Panel 02/19/2023 2/19/2018    TETANUS VACCINE 10/31/2024 10/31/2014        No orders of the defined types were placed in this encounter.    The following information is provided to all patients.  This information is to help you find resources for any of the problems found today that may be affecting your health:                Living healthy guide: www.[facility].[place].gov      Understanding Diabetes: www.diabetes.org      Eating healthy: www.cdc.gov/healthyweight      CDC home safety checklist: www.cdc.gov/steadi/patient.html      Agency on Aging: www.goea.[place].[facility]      Alcoholics anonymous (AA): www.aa.org      Physical Activity: www.loli.nih.gov/ma4fbyk      Tobacco use: www.quitwithusla.org

## 2018-08-20 NOTE — Clinical Note
Primary Care Providers:Sandi Nix MD, MD (General)Your patient was seen today for a HRA visit. Gap(s) in care (HEDIS gaps) have been identified during this visit that require additional testing and possible follow up.No orders of the defined types were placed in this encounter.These orders were placed using [facility] approved protocol and any results will be forwarded to your office for appropriate follow up. I have included a copy of my visit note; please review the note and feel free to contact me with any questions. Thank you for allowing me to participate in the care of your patients.MARGARET Archibald

## 2018-08-20 NOTE — PATIENT INSTRUCTIONS
Established High Blood Pressure    High blood pressure (hypertension) is a chronic disease. Often, healthcare providers dont know what causes it. But it can be caused by certain health conditions and medicines.  If you have high blood pressure, you may not have any symptoms. If you do have symptoms, they may include headache, dizziness, changes in your vision, chest pain, and shortness of breath. But even without symptoms, high blood pressure thats not treated raises your risk for heart attack and stroke. High blood pressure is a serious health risk and shouldnt be ignored.  A blood pressure reading is made up of two numbers: a higher number over a lower number. The top number is the systolic pressure. The bottom number is the diastolic pressure. A normal blood pressure is a systolic pressure of  less than 120 over a diastolic pressure of less than 80. You will see your blood pressure readings written together. For example, a person with a systolic pressure of 188 and a diastolic pressure of 78 will have 118/78 written in the medical record.  High blood pressure is when either the top number is 140 or higher, or the bottom number is 90 or higher. This must be the result when taking your blood pressure a number of times. The blood pressures between normal and high are called prehypertension.  Home care  If you have high blood pressure, you should do what is listed below to lower your blood pressure. If you are taking medicines for high blood pressure, these methods may reduce or end your need for medicines in the future.  · Begin a weight-loss program if you are overweight.  · Cut back on how much salt you get in your diet. Heres how to do this:  ¨ Dont eat foods that have a lot of salt. These include olives, pickles, smoked meats, and salted potato chips.  ¨ Dont add salt to your food at the table.  ¨ Use only small amounts of salt when cooking.  · Start an exercise program. Talk with your healthcare  provider about the type of exercise program that would be best for you. It doesn't have to be hard. Even brisk walking for 20 minutes 3 times a week is a good form of exercise.  · Dont take medicines that stimulate the heart. This includes many over-the-counter cold and sinus decongestant pills and sprays, as well as diet pills. Check the warnings about hypertension on the label. Before buying any over-the-counter medicines or supplements, always ask the pharmacist about the product's potential interaction with your high blood pressure and your high blood pressure medicines.  · Stimulants such as amphetamine or cocaine could be deadly for someone with high blood pressure. Never take these.  · Limit how much caffeine you get in your diet. Switch to caffeine-free products.  · Stop smoking. If you are a long-time smoker, this can be hard. Talk to your healthcare provider about medicines and nicotine replacement options to help you. Also, enroll in a stop-smoking program to make it more likely that you will quit for good.  · Learn how to handle stress. This is an important part of any program to lower blood pressure. Learn about relaxation methods like meditation, yoga, or biofeedback.  · If your provider prescribed medicines, take them exactly as directed. Missing doses may cause your blood pressure get out of control.  · If you miss a dose or doses, check with your healthcare provider or pharmacist about what to do.  · Consider buying an automatic blood pressure machine. Ask your provider for a recommendation. You can get one of these at most pharmacies.     The American Heart Association recommends the following guidelines for home blood pressure monitoring:  · Don't smoke or drink coffee for 30 minutes before taking your blood pressure.  · Go to the bathroom before the test.  · Relax for 5 minutes before taking the measurement.  · Sit with your back supported (don't sit on a couch or soft chair); keep your feet on  the floor uncrossed. Place your arm on a solid flat surface (like a table) with the upper part of the arm at heart level. Place the middle of the cuff directly above the eye of the elbow. Check the monitor's instruction manual for an illustration.  · Take multiple readings. When you measure, take 2 to 3 readings one minute apart and record all of the results.  · Take your blood pressure at the same time every day, or as your healthcare provider recommends.  · Record the date, time, and blood pressure reading.  · Take the record with you to your next medical appointment. If your blood pressure monitor has a built-in memory, simply take the monitor with you to your next appointment.  · Call your provider if you have several high readings. Don't be frightened by a single high blood pressure reading, but if you get several high readings, check in with your healthcare provider.  · Note: When blood pressure reaches a systolic (top number) of 180 or higher OR diastolic (bottom number) of 110 or higher, seek emergency medical treatment.  Follow-up care  You will need to see your healthcare provider regularly. This is to check your blood pressure and to make changes to your medicines. Make a follow-up appointment as directed. Bring the record of your home blood pressure readings to the appointment.  When to seek medical advice  Call your healthcare provider right away if any of these occur:  · Blood pressure reaches a systolic (upper number) of 180 or higher OR a diastolic (bottom number) of 110 or higher  · Chest pain or shortness of breath  · Severe headache  · Throbbing or rushing sound in the ears  · Nosebleed  · Sudden severe pain in your belly (abdomen)  · Extreme drowsiness, confusion, or fainting  · Dizziness or spinning sensation (vertigo)  · Weakness of an arm or leg or one side of the face  · You have problems speaking or seeing   Date Last Reviewed: 12/1/2016  © 0695-2274 [employer]. [address] [place] [address]  [place] [address]. All rights reserved. This information is not intended as a substitute for professional medical care. Always follow your healthcare professional's instructions.

## 2018-09-01 NOTE — PROGRESS NOTES
Subjective:       Patient ID: Efraín Anton is a 68 y.o. male.    Chief Complaint: Follow-up (6mth f/u)    HPI  Review of Systems   Constitutional: Negative for fatigue and unexpected weight change.   Respiratory: Negative for chest tightness and shortness of breath.    Cardiovascular: Negative for chest pain, palpitations and leg swelling.   Gastrointestinal: Negative for abdominal pain.   Musculoskeletal: Negative for arthralgias.   Neurological: Negative for dizziness, syncope, light-headedness and headaches.       Patient Active Problem List   Diagnosis    Hypertension    Primary osteoarthritis of both shoulders    Eczema    Esophageal stenosis    Hiatal hernia    Gastroesophageal reflux disease with esophagitis    Prostate cancer     Patient is here for a chronic conditions follow up.    No visits with results within 1 Month(s) from this visit.   Latest known visit with results is:   Lab Visit on 02/19/2018   Component Date Value Ref Range Status    Sodium 02/19/2018 140  136 - 145 mmol/L Final    Potassium 02/19/2018 5.4* 3.5 - 5.1 mmol/L Final    Chloride 02/19/2018 104  95 - 110 mmol/L Final    CO2 02/19/2018 27  23 - 29 mmol/L Final    Glucose 02/19/2018 110  70 - 110 mg/dL Final    BUN, Bld 02/19/2018 23  8 - 23 mg/dL Final    Creatinine 02/19/2018 1.2  0.5 - 1.4 mg/dL Final    Calcium 02/19/2018 9.5  8.7 - 10.5 mg/dL Final    Total Protein 02/19/2018 7.1  6.0 - 8.4 g/dL Final    Albumin 02/19/2018 3.8  3.5 - 5.2 g/dL Final    Total Bilirubin 02/19/2018 0.7  0.1 - 1.0 mg/dL Final    Alkaline Phosphatase 02/19/2018 90  55 - 135 U/L Final    AST 02/19/2018 17  10 - 40 U/L Final    ALT 02/19/2018 17  10 - 44 U/L Final    Anion Gap 02/19/2018 9  8 - 16 mmol/L Final    eGFR if African American 02/19/2018 >60.0  >60 mL/min/1.73 m^2 Final    eGFR if non African American 02/19/2018 >60.0  >60 mL/min/1.73 m^2 Final    WBC 02/19/2018 7.48  3.90 - 12.70 K/uL Final    RBC 02/19/2018 4.74   4.60 - 6.20 M/uL Final    Hemoglobin 02/19/2018 14.6  14.0 - 18.0 g/dL Final    Hematocrit 02/19/2018 44.4  40.0 - 54.0 % Final    MCV 02/19/2018 94  82 - 98 fL Final    MCH 02/19/2018 30.8  27.0 - 31.0 pg Final    MCHC 02/19/2018 32.9  32.0 - 36.0 g/dL Final    RDW 02/19/2018 12.9  11.5 - 14.5 % Final    Platelets 02/19/2018 228  150 - 350 K/uL Final    MPV 02/19/2018 10.2  9.2 - 12.9 fL Final    Immature Granulocytes 02/19/2018 0.3  0.0 - 0.5 % Final    Gran # (ANC) 02/19/2018 3.5  1.8 - 7.7 K/uL Final    Immature Grans (Abs) 02/19/2018 0.02  0.00 - 0.04 K/uL Final    Lymph # 02/19/2018 3.0  1.0 - 4.8 K/uL Final    Mono # 02/19/2018 0.8  0.3 - 1.0 K/uL Final    Eos # 02/19/2018 0.2  0.0 - 0.5 K/uL Final    Baso # 02/19/2018 0.03  0.00 - 0.20 K/uL Final    nRBC 02/19/2018 0  0 /100 WBC Final    Gran% 02/19/2018 46.2  38.0 - 73.0 % Final    Lymph% 02/19/2018 39.8  18.0 - 48.0 % Final    Mono% 02/19/2018 11.0  4.0 - 15.0 % Final    Eosinophil% 02/19/2018 2.3  0.0 - 8.0 % Final    Basophil% 02/19/2018 0.4  0.0 - 1.9 % Final    Differential Method 02/19/2018 Automated   Final    Cholesterol 02/19/2018 185  120 - 199 mg/dL Final    Triglycerides 02/19/2018 102  30 - 150 mg/dL Final    HDL 02/19/2018 46  40 - 75 mg/dL Final    LDL Cholesterol 02/19/2018 118.6  63.0 - 159.0 mg/dL Final    HDL/Chol Ratio 02/19/2018 24.9  20.0 - 50.0 % Final    Total Cholesterol/HDL Ratio 02/19/2018 4.0  2.0 - 5.0 Final    Non-HDL Cholesterol 02/19/2018 139  mg/dL Final    PSA, SCREEN 02/19/2018 2.8  0.00 - 4.00 ng/mL Final     Got colonoscopy 2017 on 5 year schedule at [facility]  Prostate removal planned for next month  Objective:      Physical Exam   Constitutional: He is oriented to person, place, and time. He appears well-developed and well-nourished.   Cardiovascular: Normal rate, regular rhythm and normal heart sounds.   Pulmonary/Chest: Effort normal and breath sounds normal.   Musculoskeletal: He exhibits no  edema.   Neurological: He is alert and oriented to person, place, and time.   Skin: Skin is warm and dry.   Psychiatric: He has a normal mood and affect.   Nursing note and vitals reviewed.      Assessment:       1. Essential hypertension    2. Blurry vision, bilateral        Plan:       1. Essential hypertension  Decrease  - lisinopril (PRINIVIL,ZESTRIL) 5 MG tablet; Take 1 tablet (5 mg total) by mouth once daily.  Dispense: 90 tablet; Refill: 3  - CBC auto differential; Future  - Comprehensive metabolic panel; Future  - Lipid panel; Future    2. Blurry vision, bilateral  Refer  - Ambulatory referral to Optometry        Time spent with patient: 20 minutes    Patient with be reevaluated in 6 months or sooner prn    Greater than 50% of this visit was spent counseling as described in above documentation:Yes

## 2018-09-11 ENCOUNTER — PATIENT OUTREACH (OUTPATIENT)
Dept: ADMINISTRATIVE | Facility: HOSPITAL | Age: 68
End: 2018-09-11

## 2018-09-26 ENCOUNTER — OFFICE VISIT (OUTPATIENT)
Dept: OPTOMETRY | Facility: CLINIC | Age: 68
End: 2018-09-26
Payer: MEDICARE

## 2018-09-26 DIAGNOSIS — H25.13 NUCLEAR SCLEROSIS OF BOTH EYES: Primary | ICD-10-CM

## 2018-09-26 PROCEDURE — 92004 COMPRE OPH EXAM NEW PT 1/>: CPT | Mod: S$PBB,,, | Performed by: OPTOMETRIST

## 2018-09-26 PROCEDURE — 99999 PR PBB SHADOW E&M-EST. PATIENT-LVL II: CPT | Mod: PBBFAC,,, | Performed by: OPTOMETRIST

## 2018-09-26 PROCEDURE — 99212 OFFICE O/P EST SF 10 MIN: CPT | Mod: PBBFAC,PO | Performed by: OPTOMETRIST

## 2018-09-26 NOTE — PROGRESS NOTES
HPI     Presenting Complaint: DLE: 10+ years Pt here today for blurred vision   over the last 3 months. Pt states hard to drive when raining and at night   time. Pt uses OTC readers for small print.     Ophthalmic medication / drops:None  (-) No eye disease or eye surgery.   (-) headaches  (-) diplopia   (-) flashes / (-) floaters      Last edited by Antonio Santiago on 9/26/2018  9:37 AM. (History)            Assessment /Plan     For exam results, see Encounter Report.    Nuclear sclerosis of both eyes      1. Refer to Dr. Rose for cataract evaluation and possible removal.

## 2018-10-30 ENCOUNTER — OFFICE VISIT (OUTPATIENT)
Dept: OPHTHALMOLOGY | Facility: CLINIC | Age: 68
End: 2018-10-30
Payer: MEDICARE

## 2018-10-30 DIAGNOSIS — H17.9 CORNEAL SCAR, LEFT EYE: ICD-10-CM

## 2018-10-30 DIAGNOSIS — H53.15 METAMORPHOPSIA: Primary | ICD-10-CM

## 2018-10-30 DIAGNOSIS — H35.351 CYSTOID MACULAR EDEMA OF RIGHT EYE: ICD-10-CM

## 2018-10-30 DIAGNOSIS — H52.7 REFRACTIVE ERROR: ICD-10-CM

## 2018-10-30 DIAGNOSIS — H25.13 NUCLEAR SCLEROSIS, BILATERAL: ICD-10-CM

## 2018-10-30 PROCEDURE — 99999 PR PBB SHADOW E&M-EST. PATIENT-LVL III: CPT | Mod: PBBFAC,HCWC,, | Performed by: OPHTHALMOLOGY

## 2018-10-30 PROCEDURE — 99213 OFFICE O/P EST LOW 20 MIN: CPT | Mod: PBBFAC,PO,HCWC,25 | Performed by: OPHTHALMOLOGY

## 2018-10-30 PROCEDURE — 92014 COMPRE OPH EXAM EST PT 1/>: CPT | Mod: S$PBB,HCWC,, | Performed by: OPHTHALMOLOGY

## 2018-10-30 PROCEDURE — 92134 CPTRZ OPH DX IMG PST SGM RTA: CPT | Mod: PBBFAC,HCWC,PO | Performed by: OPHTHALMOLOGY

## 2018-10-30 NOTE — PROGRESS NOTES
HPI     67 YO male presents today for a cataract eval. He states that he is   having trouble reading, as well as with distance vision especially [place] or dark.     Agree with above. Has noticed vision deteriorating over the past several   years, but recently over the past 4 months it has been getting much worse.   Had some issues [place] the interstate recently.     Last edited by Whitley Rose MD on 10/30/2018  9:45 AM.   (History)        ROS     Positive for: Gastrointestinal (Gerd, esophageal stenosis),   Cardiovascular (HTN - fluctuates per pt), Eyes (has had metal fragments   removed in office, denies trauma or surgery), Heme/Lymph (prostate CA)    Negative for: Neurological (denies seizure/tremor/restless legs),   Genitourinary (denies flomax), Endocrine (denies DM), Respiratory (denies   asthma/orthopnea)    Last edited by Whitley Rose MD on 10/30/2018  9:45 AM.   (History)        Assessment /Plan     For exam results, see Encounter Report.    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 Will refer to Dr. Melendrez for further evaluation and treat.    OS VA likely limited by central corneal scar    Dilates well, denies flomax, but nuclear sclerosis is not visually significant - observe    Denies subjective improvement with MRx.

## 2018-10-30 NOTE — LETTER
October 30, 2018      Deonte Guzmán, OD  [address]  [place] [address]           [address]  [facility] [address]  Phone: [phone]          Patient: Efraín Anton   MR Number: [id number]   YOB: 1950   Date of Visit: 10/30/2018       Dear Dr. Deonte Guzmán:    Thank you for referring Efraín Anton to me for evaluation. Attached you will find relevant portions of my assessment and plan of care.    If you have questions, please do not hesitate to call me. I look forward to following Efraín Anton along with you.    Sincerely,    Whitley Rose MD    Enclosure  CC:  No Recipients    If you would like to receive this communication electronically, please contact [email or web address] or [phone] to request more information on [employer] Link access.    For providers and/or their staff who would like to refer a patient to [facility], please contact us through our one-stop-shop provider referral line, [facility] , at [phone].    If you feel you have received this communication in error or would no longer like to receive these types of communications, please e-mail [email or web address]

## 2018-11-19 ENCOUNTER — TELEPHONE (OUTPATIENT)
Dept: OPHTHALMOLOGY | Facility: CLINIC | Age: 68
End: 2018-11-19

## 2018-11-19 ENCOUNTER — INITIAL CONSULT (OUTPATIENT)
Dept: OPHTHALMOLOGY | Facility: CLINIC | Age: 68
End: 2018-11-19
Payer: MEDICARE

## 2018-11-19 DIAGNOSIS — H35.033 HYPERTENSIVE RETINOPATHY, BILATERAL: ICD-10-CM

## 2018-11-19 DIAGNOSIS — H35.341 LAMELLAR MACULAR HOLE OF RIGHT EYE: Primary | ICD-10-CM

## 2018-11-19 DIAGNOSIS — H35.341 FULL THICKNESS MACULAR HOLE, STAGE 2, RIGHT: Primary | ICD-10-CM

## 2018-11-19 PROCEDURE — 92225 PR SPECIAL EYE EXAM, INITIAL: CPT | Mod: HCWC,RT,S$GLB, | Performed by: OPHTHALMOLOGY

## 2018-11-19 PROCEDURE — 92014 COMPRE OPH EXAM EST PT 1/>: CPT | Mod: HCWC,S$GLB,, | Performed by: OPHTHALMOLOGY

## 2018-11-19 PROCEDURE — 99999 PR PBB SHADOW E&M-EST. PATIENT-LVL II: CPT | Mod: PBBFAC,HCWC,, | Performed by: OPHTHALMOLOGY

## 2018-11-19 NOTE — PROGRESS NOTES
HPI     Eye Problem      Additional comments: consult per Rose              Comments     He has been noticing a decrease in vision OU for the past couple of mos. No eye pain. He is having difficulty reading even with his reading glasses        OCT - OD FTMH   OS VMA - no distortion      A/P    1. FTMH OD    PLan 25g PPV/ILM peel/SF6 OD    Local MAC  LOC 40min    With 1 day of positioning    Risks, benefits, and alternatives to treatment discussed in detail with the patient.  The patient voiced understanding and wished to proceed with the procedure    2. HTN Ret OU  BP control    3. NS OU      TO OR

## 2018-11-19 NOTE — LETTER
November 19, 2018      Whitley Rose MD  [address] [place] [address] [address]  [address]  [facility] [address]           [facility]  [address] [facility] [address] [place] [address]  Phone: [phone]  Fax: [phone]          Patient: Efraín Anton   MR Number: [id number]   YOB: 1950   Date of Visit: 11/19/2018       Dear Dr. Whitley Rose:    Thank you for referring Efraín Anton to me for evaluation. Attached you will find relevant portions of my assessment and plan of care.    If you have questions, please do not hesitate to call me. I look forward to following Efraín Anton along with you.    Sincerely,    CLAUDINE Melendrez MD    Enclosure  CC:  No Recipients    If you would like to receive this communication electronically, please contact [email or web address] or [phone] to request more information on [employer] Link access.    For providers and/or their staff who would like to refer a patient to [facility], please contact us through our one-stop-shop provider referral line, [facility], at [phone].    If you feel you have received this communication in error or would no longer like to receive these types of communications, please e-mail [email or web address]

## 2018-12-18 ENCOUNTER — TELEPHONE (OUTPATIENT)
Dept: OPHTHALMOLOGY | Facility: CLINIC | Age: 68
End: 2018-12-18

## 2018-12-18 ENCOUNTER — ANESTHESIA EVENT (OUTPATIENT)
Dept: SURGERY | Facility: HOSPITAL | Age: 68
End: 2018-12-18
Payer: MEDICARE

## 2018-12-18 NOTE — PRE-PROCEDURE INSTRUCTIONS
PreOp Instructions given:     - Verbal medication information (what to hold and what to take)   - NPO guidelines   - Arrival place directions given;   - Bathing with antibacterial soap   - Don't wear any jewelry or bring any valuables AM of surgery   - No makeup or moisturizer to face   - No perfume/cologne, powder, lotions or aftershave   Pt. verbalized understanding.   Denies any  history of side effects or issues with anesthesia or sedation.

## 2018-12-19 ENCOUNTER — HOSPITAL ENCOUNTER (OUTPATIENT)
Facility: HOSPITAL | Age: 68
Discharge: HOME | End: 2018-12-19
Attending: OPHTHALMOLOGY | Admitting: OPHTHALMOLOGY
Payer: MEDICARE

## 2018-12-19 ENCOUNTER — ANESTHESIA (OUTPATIENT)
Dept: SURGERY | Facility: HOSPITAL | Age: 68
End: 2018-12-19
Payer: MEDICARE

## 2018-12-19 VITALS
BODY MASS INDEX: 29.62 KG/M2 | HEART RATE: 62 BPM | OXYGEN SATURATION: 97 % | WEIGHT: 200 LBS | DIASTOLIC BLOOD PRESSURE: 78 MMHG | TEMPERATURE: 98 F | RESPIRATION RATE: 16 BRPM | HEIGHT: 69 IN | SYSTOLIC BLOOD PRESSURE: 132 MMHG

## 2018-12-19 DIAGNOSIS — H35.341 FULL THICKNESS MACULAR HOLE, STAGE 2, RIGHT: Primary | ICD-10-CM

## 2018-12-19 DIAGNOSIS — H35.341 FULL THICKNESS MACULAR HOLE, RIGHT: ICD-10-CM

## 2018-12-19 PROCEDURE — 67042 VIT FOR MACULAR HOLE: CPT | Mod: RT,,, | Performed by: OPHTHALMOLOGY

## 2018-12-19 PROCEDURE — S0020 INJECTION, BUPIVICAINE HYDRO: HCPCS | Performed by: OPHTHALMOLOGY

## 2018-12-19 PROCEDURE — 71000044 HC DOSC ROUTINE RECOVERY FIRST HOUR: Performed by: OPHTHALMOLOGY

## 2018-12-19 PROCEDURE — 25000003 PHARM REV CODE 250: Performed by: OPHTHALMOLOGY

## 2018-12-19 PROCEDURE — D9220A PRA ANESTHESIA: Mod: CRNA,,, | Performed by: NURSE ANESTHETIST, CERTIFIED REGISTERED

## 2018-12-19 PROCEDURE — 27600004 OPTIME MED/SURG SUP & DEVICES INTRAOCULAR LENS: Performed by: OPHTHALMOLOGY

## 2018-12-19 PROCEDURE — 63600175 PHARM REV CODE 636 W HCPCS: Performed by: NURSE ANESTHETIST, CERTIFIED REGISTERED

## 2018-12-19 PROCEDURE — 36000707: Performed by: OPHTHALMOLOGY

## 2018-12-19 PROCEDURE — D9220A PRA ANESTHESIA: Mod: ANES,,, | Performed by: ANESTHESIOLOGY

## 2018-12-19 PROCEDURE — 25000003 PHARM REV CODE 250

## 2018-12-19 PROCEDURE — 71000015 HC POSTOP RECOV 1ST HR: Performed by: OPHTHALMOLOGY

## 2018-12-19 PROCEDURE — 37000009 HC ANESTHESIA EA ADD 15 MINS: Performed by: OPHTHALMOLOGY

## 2018-12-19 PROCEDURE — 37000008 HC ANESTHESIA 1ST 15 MINUTES: Performed by: OPHTHALMOLOGY

## 2018-12-19 PROCEDURE — 36000706: Performed by: OPHTHALMOLOGY

## 2018-12-19 PROCEDURE — 63600175 PHARM REV CODE 636 W HCPCS: Performed by: OPHTHALMOLOGY

## 2018-12-19 RX ORDER — SODIUM CHLORIDE 9 MG/ML
INJECTION, SOLUTION INTRAVENOUS CONTINUOUS
Status: DISCONTINUED | OUTPATIENT
Start: 2018-12-19 | End: 2018-12-19 | Stop reason: HOSPADM

## 2018-12-19 RX ORDER — VANCOMYCIN HYDROCHLORIDE 500 MG/10ML
INJECTION, POWDER, LYOPHILIZED, FOR SOLUTION INTRAVENOUS
Status: DISCONTINUED | OUTPATIENT
Start: 2018-12-19 | End: 2018-12-19 | Stop reason: HOSPADM

## 2018-12-19 RX ORDER — FENTANYL CITRATE 50 UG/ML
50 INJECTION, SOLUTION INTRAMUSCULAR; INTRAVENOUS EVERY 5 MIN PRN
Status: DISCONTINUED | OUTPATIENT
Start: 2018-12-19 | End: 2018-12-19 | Stop reason: HOSPADM

## 2018-12-19 RX ORDER — VANCOMYCIN HYDROCHLORIDE 500 MG/10ML
INJECTION, POWDER, LYOPHILIZED, FOR SOLUTION INTRAVENOUS
Status: DISCONTINUED
Start: 2018-12-19 | End: 2018-12-19 | Stop reason: HOSPADM

## 2018-12-19 RX ORDER — TROPICAMIDE 10 MG/ML
1 SOLUTION/ DROPS OPHTHALMIC
Status: DISCONTINUED | OUTPATIENT
Start: 2018-12-19 | End: 2018-12-19 | Stop reason: HOSPADM

## 2018-12-19 RX ORDER — CYCLOPENTOLATE HYDROCHLORIDE 10 MG/ML
1 SOLUTION/ DROPS OPHTHALMIC
Status: DISCONTINUED | OUTPATIENT
Start: 2018-12-19 | End: 2018-12-19 | Stop reason: HOSPADM

## 2018-12-19 RX ORDER — PHENYLEPHRINE HYDROCHLORIDE 25 MG/ML
1 SOLUTION/ DROPS OPHTHALMIC
Status: DISCONTINUED | OUTPATIENT
Start: 2018-12-19 | End: 2018-12-19 | Stop reason: HOSPADM

## 2018-12-19 RX ORDER — MOXIFLOXACIN 5 MG/ML
1 SOLUTION/ DROPS OPHTHALMIC
Status: DISCONTINUED | OUTPATIENT
Start: 2018-12-19 | End: 2018-12-19 | Stop reason: HOSPADM

## 2018-12-19 RX ORDER — MIDAZOLAM HYDROCHLORIDE 1 MG/ML
INJECTION, SOLUTION INTRAMUSCULAR; INTRAVENOUS
Status: DISCONTINUED | OUTPATIENT
Start: 2018-12-19 | End: 2018-12-19

## 2018-12-19 RX ORDER — NEOMYCIN SULFATE, POLYMYXIN B SULFATE, AND DEXAMETHASONE 3.5; 10000; 1 MG/G; [USP'U]/G; MG/G
OINTMENT OPHTHALMIC
Status: DISCONTINUED
Start: 2018-12-19 | End: 2018-12-19 | Stop reason: HOSPADM

## 2018-12-19 RX ORDER — ONDANSETRON 8 MG/1
8 TABLET, ORALLY DISINTEGRATING ORAL EVERY 8 HOURS PRN
Status: DISCONTINUED | OUTPATIENT
Start: 2018-12-19 | End: 2018-12-19 | Stop reason: HOSPADM

## 2018-12-19 RX ORDER — OXYCODONE AND ACETAMINOPHEN 5; 325 MG/1; MG/1
1 TABLET ORAL EVERY 6 HOURS PRN
Qty: 12 TABLET | Refills: 0 | Status: SHIPPED | OUTPATIENT
Start: 2018-12-19 | End: 2019-02-04

## 2018-12-19 RX ORDER — PHENYLEPHRINE HYDROCHLORIDE 25 MG/ML
SOLUTION/ DROPS OPHTHALMIC
Status: COMPLETED
Start: 2018-12-19 | End: 2018-12-19

## 2018-12-19 RX ORDER — NEOMYCIN SULFATE, POLYMYXIN B SULFATE, AND DEXAMETHASONE 3.5; 10000; 1 MG/G; [USP'U]/G; MG/G
OINTMENT OPHTHALMIC
Status: DISCONTINUED | OUTPATIENT
Start: 2018-12-19 | End: 2018-12-19 | Stop reason: HOSPADM

## 2018-12-19 RX ORDER — PROPOFOL 10 MG/ML
VIAL (ML) INTRAVENOUS
Status: DISCONTINUED | OUTPATIENT
Start: 2018-12-19 | End: 2018-12-19

## 2018-12-19 RX ORDER — EPINEPHRINE 1 MG/ML
INJECTION, SOLUTION INTRACARDIAC; INTRAMUSCULAR; INTRAVENOUS; SUBCUTANEOUS
Status: DISCONTINUED
Start: 2018-12-19 | End: 2018-12-19 | Stop reason: HOSPADM

## 2018-12-19 RX ORDER — PREDNISOLONE ACETATE 10 MG/ML
SUSPENSION/ DROPS OPHTHALMIC
Status: COMPLETED
Start: 2018-12-19 | End: 2018-12-19

## 2018-12-19 RX ORDER — ONDANSETRON 2 MG/ML
4 INJECTION INTRAMUSCULAR; INTRAVENOUS DAILY PRN
Status: DISCONTINUED | OUTPATIENT
Start: 2018-12-19 | End: 2018-12-19 | Stop reason: HOSPADM

## 2018-12-19 RX ORDER — INDOCYANINE GREEN AND WATER 25 MG
KIT INJECTION
Status: DISCONTINUED | OUTPATIENT
Start: 2018-12-19 | End: 2018-12-19 | Stop reason: HOSPADM

## 2018-12-19 RX ORDER — HYDROCODONE BITARTRATE AND ACETAMINOPHEN 5; 325 MG/1; MG/1
1 TABLET ORAL EVERY 4 HOURS PRN
Status: DISCONTINUED | OUTPATIENT
Start: 2018-12-19 | End: 2018-12-19 | Stop reason: HOSPADM

## 2018-12-19 RX ORDER — ONDANSETRON 4 MG/1
4 TABLET, FILM COATED ORAL EVERY 8 HOURS PRN
Qty: 12 TABLET | Refills: 0 | Status: SHIPPED | OUTPATIENT
Start: 2018-12-19 | End: 2018-12-31

## 2018-12-19 RX ORDER — LIDOCAINE HYDROCHLORIDE 10 MG/ML
1 INJECTION, SOLUTION EPIDURAL; INFILTRATION; INTRACAUDAL; PERINEURAL ONCE
Status: COMPLETED | OUTPATIENT
Start: 2018-12-19 | End: 2018-12-19

## 2018-12-19 RX ORDER — SODIUM CHLORIDE 0.9 % (FLUSH) 0.9 %
3 SYRINGE (ML) INJECTION
Status: DISCONTINUED | OUTPATIENT
Start: 2018-12-19 | End: 2018-12-19 | Stop reason: HOSPADM

## 2018-12-19 RX ORDER — BUPIVACAINE HYDROCHLORIDE 7.5 MG/ML
INJECTION, SOLUTION EPIDURAL; RETROBULBAR
Status: DISCONTINUED | OUTPATIENT
Start: 2018-12-19 | End: 2018-12-19 | Stop reason: HOSPADM

## 2018-12-19 RX ORDER — EPINEPHRINE CONVENIENCE KIT 1 MG/ML(1)
KIT INTRAMUSCULAR; SUBCUTANEOUS
Status: DISCONTINUED | OUTPATIENT
Start: 2018-12-19 | End: 2018-12-19 | Stop reason: HOSPADM

## 2018-12-19 RX ORDER — DEXAMETHASONE SODIUM PHOSPHATE 4 MG/ML
INJECTION, SOLUTION INTRA-ARTICULAR; INTRALESIONAL; INTRAMUSCULAR; INTRAVENOUS; SOFT TISSUE
Status: DISCONTINUED
Start: 2018-12-19 | End: 2018-12-19 | Stop reason: HOSPADM

## 2018-12-19 RX ORDER — TETRACAINE HYDROCHLORIDE 5 MG/ML
1 SOLUTION OPHTHALMIC
Status: DISCONTINUED | OUTPATIENT
Start: 2018-12-19 | End: 2018-12-19 | Stop reason: HOSPADM

## 2018-12-19 RX ORDER — ACETAMINOPHEN 325 MG/1
650 TABLET ORAL EVERY 4 HOURS PRN
Status: DISCONTINUED | OUTPATIENT
Start: 2018-12-19 | End: 2018-12-19 | Stop reason: HOSPADM

## 2018-12-19 RX ORDER — INDOCYANINE GREEN AND WATER 25 MG
KIT INJECTION
Status: DISCONTINUED
Start: 2018-12-19 | End: 2018-12-19 | Stop reason: HOSPADM

## 2018-12-19 RX ORDER — PREDNISOLONE ACETATE 10 MG/ML
1 SUSPENSION/ DROPS OPHTHALMIC
Status: DISCONTINUED | OUTPATIENT
Start: 2018-12-19 | End: 2018-12-19 | Stop reason: HOSPADM

## 2018-12-19 RX ORDER — DEXAMETHASONE SODIUM PHOSPHATE 4 MG/ML
INJECTION, SOLUTION INTRA-ARTICULAR; INTRALESIONAL; INTRAMUSCULAR; INTRAVENOUS; SOFT TISSUE
Status: DISCONTINUED | OUTPATIENT
Start: 2018-12-19 | End: 2018-12-19 | Stop reason: HOSPADM

## 2018-12-19 RX ORDER — BUPIVACAINE HYDROCHLORIDE 7.5 MG/ML
INJECTION, SOLUTION EPIDURAL; RETROBULBAR
Status: DISCONTINUED
Start: 2018-12-19 | End: 2018-12-19 | Stop reason: HOSPADM

## 2018-12-19 RX ORDER — LIDOCAINE HCL/PF 100 MG/5ML
SYRINGE (ML) INTRAVENOUS
Status: DISCONTINUED | OUTPATIENT
Start: 2018-12-19 | End: 2018-12-19

## 2018-12-19 RX ORDER — LIDOCAINE HYDROCHLORIDE 20 MG/ML
INJECTION, SOLUTION EPIDURAL; INFILTRATION; INTRACAUDAL; PERINEURAL
Status: DISCONTINUED | OUTPATIENT
Start: 2018-12-19 | End: 2018-12-19 | Stop reason: HOSPADM

## 2018-12-19 RX ORDER — LIDOCAINE HYDROCHLORIDE 20 MG/ML
INJECTION, SOLUTION EPIDURAL; INFILTRATION; INTRACAUDAL; PERINEURAL
Status: DISCONTINUED
Start: 2018-12-19 | End: 2018-12-19 | Stop reason: HOSPADM

## 2018-12-19 RX ORDER — TROPICAMIDE 10 MG/ML
SOLUTION/ DROPS OPHTHALMIC
Status: COMPLETED
Start: 2018-12-19 | End: 2018-12-19

## 2018-12-19 RX ADMIN — PHENYLEPHRINE HYDROCHLORIDE 1 DROP: 25 SOLUTION/ DROPS OPHTHALMIC at 06:12

## 2018-12-19 RX ADMIN — CYCLOPENTOLATE HYDROCHLORIDE 1 DROP: 10 SOLUTION/ DROPS OPHTHALMIC at 06:12

## 2018-12-19 RX ADMIN — TROPICAMIDE 1 DROP: 10 SOLUTION/ DROPS OPHTHALMIC at 06:12

## 2018-12-19 RX ADMIN — PREDNISOLONE ACETATE 1 DROP: 10 SUSPENSION/ DROPS OPHTHALMIC at 06:12

## 2018-12-19 RX ADMIN — MOXIFLOXACIN 1 DROP: 5 SOLUTION/ DROPS OPHTHALMIC at 06:12

## 2018-12-19 RX ADMIN — HOMATROPINE HYDROBROMIDE 1 DROP: 50 SOLUTION OPHTHALMIC at 06:12

## 2018-12-19 RX ADMIN — MIDAZOLAM HYDROCHLORIDE 1 MG: 1 INJECTION, SOLUTION INTRAMUSCULAR; INTRAVENOUS at 08:12

## 2018-12-19 RX ADMIN — LIDOCAINE HYDROCHLORIDE 60 MG: 20 INJECTION, SOLUTION INTRAVENOUS at 08:12

## 2018-12-19 RX ADMIN — PROPOFOL 50 MG: 10 INJECTION, EMULSION INTRAVENOUS at 08:12

## 2018-12-19 RX ADMIN — TETRACAINE HYDROCHLORIDE 1 DROP: 5 SOLUTION OPHTHALMIC at 06:12

## 2018-12-19 RX ADMIN — LIDOCAINE HYDROCHLORIDE 1 MG: 10 INJECTION, SOLUTION EPIDURAL; INFILTRATION; INTRACAUDAL at 07:12

## 2018-12-19 RX ADMIN — SODIUM CHLORIDE: 0.9 INJECTION, SOLUTION INTRAVENOUS at 07:12

## 2018-12-19 NOTE — INTERVAL H&P NOTE
H&P completed on 12/18/18 has been reviewed, the patient has been examined and:  I concur with the findings and no changes have occurred since H&P was written.    Active Hospital Problems    Diagnosis  POA    Full thickness macular hole, right [H35.341]  Yes      Resolved Hospital Problems   No resolved problems to display.

## 2018-12-19 NOTE — ANESTHESIA PREPROCEDURE EVALUATION
12/19/2018  Efraín Anton is a 68 y.o., male.  Pre-operative evaluation for Procedure(s) (LRB):  VITRECTOMY, PARS PLANA APPROACH (Right)    Efraín Anton is a 68 y.o. male     Patient Active Problem List   Diagnosis    Hypertension    Primary osteoarthritis of both shoulders    Eczema    Esophageal stenosis    Hiatal hernia    Gastroesophageal reflux disease with esophagitis    Prostate cancer    Full thickness macular hole, stage 2, right    Hypertensive retinopathy, bilateral    Full thickness macular hole, right       Review of patient's allergies indicates:  No Known Allergies    No current facility-administered medications on file prior to encounter.      Current Outpatient Medications on File Prior to Encounter   Medication Sig Dispense Refill    docusate sodium (STOOL SOFTENER ORAL) Take by mouth.      lisinopril (PRINIVIL,ZESTRIL) 5 MG tablet Take 1 tablet (5 mg total) by mouth once daily. (Patient taking differently: Take 5 mg by mouth every evening. ) 90 tablet 3    triamcinolone acetonide 0.1% (KENALOG) 0.1 % cream Apply topically 2 (two) times daily. for 10 days 80 g 0     Past Medical History:   Diagnosis Date    Arthritis     Eczema     Hypertension     Lamellar macular hole of right eye 12/2018    Prostate cancer 8/20/2018    Tinnitus of right ear 6/13/2016    Tinnitus, objective        Past Surgical History:   Procedure Laterality Date    COLONOSCOPY W/ POLYPECTOMY      ESOPHAGOGASTRODUODENOSCOPY (EGD) N/A 10/10/2017    Performed by Xi Acosta MD at [facility] ENDO    PROSTATE BIOPSY         Social History     Socioeconomic History    Marital status:      Spouse name: Not on file    Number of children: Not on file    Years of education: Not on file    Highest education level: Not on file   Social Needs    Financial resource strain: Not on file     "Food insecurity - worry: Not on file    Food insecurity - inability: Not on file    Transportation needs - medical: Not on file    Transportation needs - non-medical: Not on file   Occupational History    Not on file   Tobacco Use    Smoking status: Former Smoker     Packs/day: 3.00     Years: 25.00     Pack years: 75.00     Types: Cigarettes     Start date: 1963     Last attempt to quit: 3/27/1983     Years since quittin.7    Smokeless tobacco: Never Used   Substance and Sexual Activity    Alcohol use: Yes     Alcohol/week: 2.4 oz     Types: 3 Cans of beer, 1 Shots of liquor per week     Comment: EVERY OTHER DAY    Drug use: No    Sexual activity: Not Currently   Other Topics Concern    Not on file   Social History Narrative    Not on file     Lab Results   Component Value Date    WBC 7.48 2018    HGB 14.6 2018    HCT 44.4 2018    MCV 94 2018     2018     BMP  Lab Results   Component Value Date     2018    K 5.4 (H) 2018     2018    CO2 27 2018    BUN 23 2018    CREATININE 1.2 2018    CALCIUM 9.5 2018    ANIONGAP 9 2018    ESTGFRAFRICA >60.0 2018    EGFRNONAA >60.0 2018     Last 3 sets of Vitals    Vitals - 1 value per visit 10/30/2018 2018 2018   SYSTOLIC - - 147   DIASTOLIC - - 94   PULSE - - 74   TEMPERATURE - - 97.4   RESPIRATIONS - - 16   SPO2 - - 97   Weight (lb) - - 200   Weight (kg) - - 90.719   HEIGHT - - 5' 9"   BODY MASS INDEX - - 29.53   VISIT REPORT - - -   Pain Score  0 0 -       Anesthesia Evaluation    I have reviewed the Patient Summary Reports.    I have reviewed the Nursing Notes.   I have reviewed the Medications.     Review of Systems  Anesthesia Hx:  No problems with previous Anesthesia Denies Hx of Anesthetic complications  History of prior surgery of interest to airway management or planning: Previous anesthesia: General, MAC Denies Family Hx of " Anesthesia complications.   Denies Personal Hx of Anesthesia complications.   Social:  Former Smoker, Social Alcohol Use    Hematology/Oncology:  Hematology Normal      Oncology Comments: Prostate ca s/p resection    EENT/Dental:EENT/Dental Normal   Cardiovascular:   Exercise tolerance: good Hypertension, well controlled Denies CAD.     Denies Angina. ECG has been reviewed.    Pulmonary:  Pulmonary Normal  Denies Asthma.  Denies Sleep Apnea.    Renal/:  Renal/ Normal     Hepatic/GI:  Hepatic/GI Normal  Denies GERD. Documented h/o hiatal hernia, GERD. Pt denies symptoms   Musculoskeletal:   Arthritis (osteoarthritis)     Neurological:  Neurology Normal  Denies CVA. Denies Seizures.    Endocrine:  Endocrine Normal        Physical Exam  General:  Well nourished    Airway/Jaw/Neck:  Airway Findings: Mouth Opening: Normal Tongue: Normal  General Airway Assessment: Adult  Mallampati: II  TM Distance: Normal, at least 6 cm  Jaw/Neck Findings:  Micrognathia: Negative Neck ROM: Normal ROM  Neck Findings:      Dental:  Dental Findings: In tact    Chest/Lungs:  Chest/Lungs Findings: Clear to auscultation, Normal Respiratory Rate     Heart/Vascular:  Heart Findings: Rate: Normal  Rhythm: Regular Rhythm  Sounds: Normal     Abdomen:  Abdomen Findings:  Normal     Musculoskeletal:  Musculoskeletal Findings:    Skin:  Skin Findings:     Mental Status:  Mental Status Findings:  Alert and Oriented, Cooperative         Anesthesia Plan  Type of Anesthesia, risks & benefits discussed:  Anesthesia Type:  MAC  Patient's Preference:   Intra-op Monitoring Plan: standard ASA monitors  Intra-op Monitoring Plan Comments:   Post Op Pain Control Plan: multimodal analgesia, IV/PO Opioids PRN and per primary service following discharge from PACU  Post Op Pain Control Plan Comments:   Induction:   IV  Beta Blocker:  Patient is not currently on a Beta-Blocker (No further documentation required).       Informed Consent: Patient understands risks  and agrees with Anesthesia plan.  Questions answered. Anesthesia consent signed with patient.  ASA Score: 2     Day of Surgery Review of History & Physical:    H&P update referred to the surgeon.     Anesthesia Plan Notes: Plan MAC/block. GA backup PRN. All questions answered. Informed consent obtained. Pt agrees to proceed.           Ready For Surgery From Anesthesia Perspective.

## 2018-12-19 NOTE — ANESTHESIA POSTPROCEDURE EVALUATION
"Anesthesia Post Evaluation    Patient: Efraín Anton    Procedure(s) Performed: Procedure(s) (LRB):  VITRECTOMY, PARS PLANA APPROACH (Right)    Final Anesthesia Type: MAC  [facility]  Patient participation: Yes- Able to Participate  Level of consciousness: awake and alert  Post-procedure vital signs: reviewed and stable  Pain management: adequate  Airway patency: patent  PONV status at discharge: No PONV  Anesthetic complications: no      Cardiovascular status: blood pressure returned to baseline and hemodynamically stable  Respiratory status: spontaneous ventilation, unassisted and room air  Hydration status: euvolemic  Follow-up not needed.        Visit Vitals  /78   Pulse 62   Temp 36.7 °C (98 °F) (Temporal)   Resp 16   Ht 5' 9" (1.753 m)   Wt 90.7 kg (200 lb)   SpO2 97%   BMI 29.53 kg/m²       Pain/Brannon Score: Brannon Score: 10 (12/19/2018  9:28 AM)        "

## 2018-12-19 NOTE — TRANSFER OF CARE
"Anesthesia Transfer of Care Note    Patient: Efraín Anton    Procedure(s) Performed: Procedure(s) (LRB):  VITRECTOMY, PARS PLANA APPROACH (Right)    Patient location: PACU    Anesthesia Type: MAC    Transport from OR: Transported from OR on room air with adequate spontaneous ventilation    Post pain: adequate analgesia    Post assessment: no apparent anesthetic complications and tolerated procedure well    Post vital signs: stable    Level of consciousness: awake, alert and oriented    Nausea/Vomiting: no nausea/vomiting    Complications: none    Transfer of care protocol was followed      Last vitals:   Visit Vitals  BP (!) 147/94   Pulse 74   Temp 36.3 °C (97.4 °F) (Oral)   Resp 16   Ht 5' 9" (1.753 m)   Wt 90.7 kg (200 lb)   SpO2 97%   BMI 29.53 kg/m²     "

## 2018-12-19 NOTE — OP NOTE
DATE OF PROCEDURE:  12/19/2018.    PREOPERATIVE DIAGNOSIS:  Full-thickness macular hole to the right eye.    POSTOPERATIVE DIAGNOSIS:  Full-thickness macular hole to the right eye.    PROCEDURE PERFORMED:  A 25-gauge pars plana vitrectomy with internal limiting   membrane peel, air-fluid exchange, injection of SF6 gas 20% to the right eye.    ENDOLASER PARAMETERS:  Number of spots 480, power 120-150 milliwatts, duration   0.1 seconds.    ATTENDING SURGEON:  CLAUDINE Melendrez M.D.    ASSISTANT SURGEON:  Joselito Bryson.    ANESTHESIA:  Local monitored anesthesia care with a retrobulbar injection of 4.0   mL mixture of 0.75% Marcaine and 2% Xylocaine.    ESTIMATED BLOOD LOSS:  Minimal.    COMPLICATIONS:  None.    DISPOSITION:  Stable to Recovery.    INDICATIONS FOR SURGERY:  This is a 68-year-old male with a history of blurred   and distorted vision out of his right eye noticed over the last two to three   months.  The patient was found to have a full-thickness macular hole, confirmed   on examination and OCT.  Decision was made to take the patient to surgery to   repair the macular hole, improve vision and improve quality of life.  Risks,   benefits, and alternatives of surgery were discussed in detail with risks   including loss of vision, loss of eye, retinal detachment, infection,   hemorrhage, cataract formation, lens dislocation, glaucoma, hypotony, ptosis and   diplopia.  The patient voiced understanding and wished to proceed with the   procedure.    DESCRIPTION OF PROCEDURE:  After proper informed consent was obtained, the   patient was brought back to the Operative Suite at [facility] where   MAC anesthesia was induced.  Retrobulbar injection was provided as above without   complication.  The patient was prepped and draped in normal sterile fashion for   ophthalmic surgery.  Lid speculum was placed in the right eye.  A standard   3-port 25-gauge pars plana vitrectomy was set up with the  infusion cannula   inserted 4 mm posterior to the limbus.  The infusion cannula was turned on only   after observed to be free and clear of all underlying retinal tissue.    Supranasal and supratemporal trocars were also placed 4 mm posterior to the   limbus.  The vitrector and light pipe were introduced in the vitreous cavity and   a core vitrectomy was performed.  Posterior hyaloid was elevated and propagated   out to the level of the vitreous base.  ICG was used to stain the internal   limiting membrane, which was peeled off under direct contact lens visualization   with the ILM forceps.  Scleral depression was performed 360 degrees to help with   removal of the cortical vitreous.  Three foci of retinal traction were found,   one with a retinal break at the 11 o'clock position and tractional areas with   small bleeds at the 12 o'clock and 5 o'clock position.  Endolaser was used in a   barrier fashion around these areas to prevent retinal detachment.  An air-fluid   exchange was performed.  The supranasal trocar was removed from the eye, not   leaking after gentle massage.  A 20% SF6 gas mixture was created, 40 mL were   cycled through the eye.  The supratemporal infusion trocar was removed and not   leaking after gentle massage.  The eye was normal pressure via palpation.    Subconjunctival injections of vancomycin and Decadron were given to the patient.    The drapes were removed from the patient.  He was washed free of Betadine prep   solution.  Maxitrol ointment was placed in the right eye.  The eye was patched,   shielded.  MAC anesthesia was reversed.  He was brought to the Recovery Room in   stable condition, tolerating the procedure well.  Dr. Melendrez was present for   the entire case.      YORDY  dd: 12/19/2018 09:07:28 (CST)  td: 12/19/2018 09:31:55 (CST)  Doc ID   #[id number]  Job ID #[id number]    CC:

## 2018-12-19 NOTE — H&P
Pre-Operative History & Physical  Ophthalmology      SUBJECTIVE:     History of Present Illness:  Patient is a 68 y.o. male presents with Lamellar macular hole of right eye [H35.341].    MEDICATIONS:   No medications prior to admission.       ALLERGIES: Review of patient's allergies indicates:  No Known Allergies    PAST MEDICAL HISTORY:   Past Medical History:   Diagnosis Date    Arthritis     Eczema     Hypertension     Prostate cancer 2018    Tinnitus of right ear 2016    Tinnitus, objective      PAST SURGICAL HISTORY:   Past Surgical History:   Procedure Laterality Date    COLONOSCOPY W/ POLYPECTOMY      ESOPHAGOGASTRODUODENOSCOPY (EGD) N/A 10/10/2017    [facility]O    PROSTATE BIOPSY       PAST FAMILY HISTORY:   Family History   Problem Relation Age of Onset    Heart disease Father 82        acute MI    Diabetes Mother     Hypertension Sister     No Known Problems Daughter     Hypertension Sister     No Known Problems Sister     No Known Problems Daughter     Glaucoma Neg Hx     Macular degeneration Neg Hx     Retinal detachment Neg Hx      SOCIAL HISTORY:   Social History     Tobacco Use    Smoking status: Former Smoker     Packs/day: 3.00     Years: 25.00     Pack years: 75.00     Types: Cigarettes     Start date: 1963     Last attempt to quit: 3/27/1983     Years since quittin.7    Smokeless tobacco: Never Used   Substance Use Topics    Alcohol use: Yes     Alcohol/week: 2.4 oz     Types: 3 Cans of beer, 1 Shots of liquor per week     Comment: EVERY OTHER DAY    Drug use: No        MENTAL STATUS: Alert    REVIEW OF SYSTEMS: Negative    OBJECTIVE:     Vital Signs (Most Recent)       Physical Exam:  General: NAD  HEENT: Atraumatic  Lungs: Adequate respirations, LCTAB  Heart: RRR, No murmur  Abdomen: Soft NT    ASSESSMENT/PLAN:     Patient is a 68 y.o. male with Lamellar macular hole of right eye [H35.341].     - Plan for surgical  correction PLan 25g PPV/ILM peel/SF6 OD     Local MAC  LOC 40min      - Risks/benefits/alternatives of the procedure including, but not limited to scarring, bleeding, infection, loss or decreased vision, and/or need for possible repeat surgery discussed with the patient and family.   - Informed consent obtained prior to surgery and the patient/family voiced good understanding.    Ismael Bryson  12/18/2018  8:10 PM

## 2018-12-20 ENCOUNTER — OFFICE VISIT (OUTPATIENT)
Dept: OPHTHALMOLOGY | Facility: CLINIC | Age: 68
End: 2018-12-20
Payer: MEDICARE

## 2018-12-20 DIAGNOSIS — H35.033 HYPERTENSIVE RETINOPATHY, BILATERAL: ICD-10-CM

## 2018-12-20 DIAGNOSIS — H35.341 FULL THICKNESS MACULAR HOLE, STAGE 2, RIGHT: Primary | ICD-10-CM

## 2018-12-20 PROCEDURE — 99999 PR PBB SHADOW E&M-EST. PATIENT-LVL III: CPT | Mod: PBBFAC,,, | Performed by: OPHTHALMOLOGY

## 2018-12-20 PROCEDURE — 99024 POSTOP FOLLOW-UP VISIT: CPT | Mod: S$GLB,,, | Performed by: OPHTHALMOLOGY

## 2018-12-20 NOTE — PROGRESS NOTES
HPI     1 Day post op check PPV OD     Last edited by America Reeder on 12/20/2018  1:48 PM. (History)             Prior OCT - OD FTMH   OS VMA - no distortion      A/P    1. FTMH OD    s/p 25g PPV/ILM peel/EL/SF6 OD 12/19/18 for FTMH, and VR tags    Doing well, good IOP    Start gtts QID, ointment/shield QHS  Ok for face forward and sleep on side    2. HTN Ret OU  BP control    3. NS OU      F/U 10 days

## 2018-12-28 ENCOUNTER — TELEPHONE (OUTPATIENT)
Dept: FAMILY MEDICINE | Facility: CLINIC | Age: 68
End: 2018-12-28

## 2018-12-28 DIAGNOSIS — I10 ESSENTIAL HYPERTENSION: ICD-10-CM

## 2018-12-28 RX ORDER — LISINOPRIL 5 MG/1
5 TABLET ORAL DAILY
Qty: 90 TABLET | Refills: 3 | Status: SHIPPED | OUTPATIENT
Start: 2018-12-28 | End: 2019-10-22 | Stop reason: SDUPTHER

## 2018-12-31 ENCOUNTER — OFFICE VISIT (OUTPATIENT)
Dept: OPHTHALMOLOGY | Facility: CLINIC | Age: 68
End: 2018-12-31
Payer: MEDICARE

## 2018-12-31 DIAGNOSIS — H35.341 FULL THICKNESS MACULAR HOLE, RIGHT: Primary | ICD-10-CM

## 2018-12-31 PROCEDURE — 99024 POSTOP FOLLOW-UP VISIT: CPT | Mod: S$GLB,,, | Performed by: OPHTHALMOLOGY

## 2018-12-31 PROCEDURE — 99999 PR PBB SHADOW E&M-EST. PATIENT-LVL III: CPT | Mod: PBBFAC,,, | Performed by: OPHTHALMOLOGY

## 2018-12-31 NOTE — PROGRESS NOTES
HPI     11 Day post op check PPV OD     Pt sts OD slight improvement since last visit can still see bubble     VIG HA PF - QID     Last edited by Glenna Rajan on 12/31/2018 11:12 AM. (History)        Prior OCT - OD FTMH   OS VMA - no distortion      A/P    1. FTMH OD    s/p 25g PPV/ILM peel/EL/SF6 OD 12/19/18 for FTMH, and VR tags    Doing well, good IOP    Taper PF 2/1/0  HA BID until bubble gone    2. HTN Ret OU  BP control    3. NS OU      1 month OCT

## 2019-02-04 ENCOUNTER — OFFICE VISIT (OUTPATIENT)
Dept: OPHTHALMOLOGY | Facility: CLINIC | Age: 69
End: 2019-02-04
Payer: MEDICARE

## 2019-02-04 DIAGNOSIS — H35.341 FULL THICKNESS MACULAR HOLE, RIGHT: ICD-10-CM

## 2019-02-04 DIAGNOSIS — H35.341 FULL THICKNESS MACULAR HOLE, STAGE 2, RIGHT: ICD-10-CM

## 2019-02-04 DIAGNOSIS — H35.033 HYPERTENSIVE RETINOPATHY, BILATERAL: Primary | ICD-10-CM

## 2019-02-04 PROCEDURE — 92134 CPTRZ OPH DX IMG PST SGM RTA: CPT | Mod: HCNC,S$GLB,, | Performed by: OPHTHALMOLOGY

## 2019-02-04 PROCEDURE — 99999 PR PBB SHADOW E&M-EST. PATIENT-LVL III: ICD-10-PCS | Mod: PBBFAC,HCNC,, | Performed by: OPHTHALMOLOGY

## 2019-02-04 PROCEDURE — 99024 PR POST-OP FOLLOW-UP VISIT: ICD-10-PCS | Mod: HCNC,S$GLB,, | Performed by: OPHTHALMOLOGY

## 2019-02-04 PROCEDURE — 92134 POSTERIOR SEGMENT OCT RETINA (OCULAR COHERENCE TOMOGRAPHY)-BOTH EYES: ICD-10-PCS | Mod: HCNC,S$GLB,, | Performed by: OPHTHALMOLOGY

## 2019-02-04 PROCEDURE — 99024 POSTOP FOLLOW-UP VISIT: CPT | Mod: HCNC,S$GLB,, | Performed by: OPHTHALMOLOGY

## 2019-02-04 PROCEDURE — 99999 PR PBB SHADOW E&M-EST. PATIENT-LVL III: CPT | Mod: PBBFAC,HCNC,, | Performed by: OPHTHALMOLOGY

## 2019-02-04 NOTE — PROGRESS NOTES
HPI     1 mo / OCT   Pt sts vision seemed to have improved a little after last visit but has been stable since Still seeing floaters OD denies pain     No gtts          Prior OCT - OD FTMH   OS VMA - no distortion      A/P    1. FTMH OD    s/p 25g PPV/ILM peel/EL/SF6 OD 12/19/18 for FTMH, and VR tags    Doing well, good IOP      2. HTN Ret OU  BP control     3. NS OU      3 month OCT/MRx

## 2019-02-21 ENCOUNTER — LAB VISIT (OUTPATIENT)
Dept: LAB | Facility: HOSPITAL | Age: 69
End: 2019-02-21
Attending: FAMILY MEDICINE
Payer: MEDICARE

## 2019-02-21 DIAGNOSIS — I10 ESSENTIAL HYPERTENSION: ICD-10-CM

## 2019-02-21 LAB
ALBUMIN SERPL BCP-MCNC: 3.9 G/DL
ALP SERPL-CCNC: 120 U/L
ALT SERPL W/O P-5'-P-CCNC: 17 U/L
ANION GAP SERPL CALC-SCNC: 9 MMOL/L
AST SERPL-CCNC: 18 U/L
BASOPHILS # BLD AUTO: 0.02 K/UL
BASOPHILS NFR BLD: 0.3 %
BILIRUB SERPL-MCNC: 0.8 MG/DL
BUN SERPL-MCNC: 17 MG/DL
CALCIUM SERPL-MCNC: 9.9 MG/DL
CHLORIDE SERPL-SCNC: 104 MMOL/L
CHOLEST SERPL-MCNC: 193 MG/DL
CHOLEST/HDLC SERPL: 4.7 {RATIO}
CO2 SERPL-SCNC: 28 MMOL/L
CREAT SERPL-MCNC: 1.1 MG/DL
DIFFERENTIAL METHOD: NORMAL
EOSINOPHIL # BLD AUTO: 0.3 K/UL
EOSINOPHIL NFR BLD: 4.6 %
ERYTHROCYTE [DISTWIDTH] IN BLOOD BY AUTOMATED COUNT: 13.2 %
EST. GFR  (AFRICAN AMERICAN): >60 ML/MIN/1.73 M^2
EST. GFR  (NON AFRICAN AMERICAN): >60 ML/MIN/1.73 M^2
GLUCOSE SERPL-MCNC: 87 MG/DL
HCT VFR BLD AUTO: 48.2 %
HDLC SERPL-MCNC: 41 MG/DL
HDLC SERPL: 21.2 %
HGB BLD-MCNC: 15.8 G/DL
IMM GRANULOCYTES # BLD AUTO: 0.03 K/UL
IMM GRANULOCYTES NFR BLD AUTO: 0.4 %
LDLC SERPL CALC-MCNC: 121.6 MG/DL
LYMPHOCYTES # BLD AUTO: 2.6 K/UL
LYMPHOCYTES NFR BLD: 36.7 %
MCH RBC QN AUTO: 30.9 PG
MCHC RBC AUTO-ENTMCNC: 32.8 G/DL
MCV RBC AUTO: 94 FL
MONOCYTES # BLD AUTO: 0.9 K/UL
MONOCYTES NFR BLD: 11.9 %
NEUTROPHILS # BLD AUTO: 3.3 K/UL
NEUTROPHILS NFR BLD: 46.1 %
NONHDLC SERPL-MCNC: 152 MG/DL
NRBC BLD-RTO: 0 /100 WBC
PLATELET # BLD AUTO: 257 K/UL
PMV BLD AUTO: 10.2 FL
POTASSIUM SERPL-SCNC: 5 MMOL/L
PROT SERPL-MCNC: 7.3 G/DL
RBC # BLD AUTO: 5.11 M/UL
SODIUM SERPL-SCNC: 141 MMOL/L
TRIGL SERPL-MCNC: 152 MG/DL
WBC # BLD AUTO: 7.2 K/UL

## 2019-02-21 PROCEDURE — 80061 LIPID PANEL: CPT | Mod: HCNC

## 2019-02-21 PROCEDURE — 80053 COMPREHEN METABOLIC PANEL: CPT | Mod: HCNC

## 2019-02-21 PROCEDURE — 85025 COMPLETE CBC W/AUTO DIFF WBC: CPT | Mod: HCNC

## 2019-02-21 PROCEDURE — 36415 COLL VENOUS BLD VENIPUNCTURE: CPT | Mod: HCNC,PO

## 2019-02-27 ENCOUNTER — OFFICE VISIT (OUTPATIENT)
Dept: FAMILY MEDICINE | Facility: CLINIC | Age: 69
End: 2019-02-27
Payer: MEDICARE

## 2019-02-27 VITALS
WEIGHT: 201.94 LBS | OXYGEN SATURATION: 95 % | HEIGHT: 69 IN | BODY MASS INDEX: 29.91 KG/M2 | TEMPERATURE: 98 F | DIASTOLIC BLOOD PRESSURE: 78 MMHG | SYSTOLIC BLOOD PRESSURE: 136 MMHG | HEART RATE: 93 BPM

## 2019-02-27 DIAGNOSIS — C61 PROSTATE CANCER: ICD-10-CM

## 2019-02-27 DIAGNOSIS — H35.033 HYPERTENSIVE RETINOPATHY, BILATERAL: ICD-10-CM

## 2019-02-27 DIAGNOSIS — I10 ESSENTIAL HYPERTENSION: Primary | ICD-10-CM

## 2019-02-27 DIAGNOSIS — R53.81 DEBILITY: ICD-10-CM

## 2019-02-27 PROCEDURE — 99499 RISK ADDL DX/OHS AUDIT: ICD-10-PCS | Mod: S$GLB,,, | Performed by: NURSE PRACTITIONER

## 2019-02-27 PROCEDURE — 3078F DIAST BP <80 MM HG: CPT | Mod: HCNC,CPTII,S$GLB, | Performed by: NURSE PRACTITIONER

## 2019-02-27 PROCEDURE — 1101F PT FALLS ASSESS-DOCD LE1/YR: CPT | Mod: HCNC,CPTII,S$GLB, | Performed by: NURSE PRACTITIONER

## 2019-02-27 PROCEDURE — 3075F PR MOST RECENT SYSTOLIC BLOOD PRESS GE 130-139MM HG: ICD-10-PCS | Mod: HCNC,CPTII,S$GLB, | Performed by: NURSE PRACTITIONER

## 2019-02-27 PROCEDURE — 99499 UNLISTED E&M SERVICE: CPT | Mod: S$GLB,,, | Performed by: NURSE PRACTITIONER

## 2019-02-27 PROCEDURE — 3078F PR MOST RECENT DIASTOLIC BLOOD PRESSURE < 80 MM HG: ICD-10-PCS | Mod: HCNC,CPTII,S$GLB, | Performed by: NURSE PRACTITIONER

## 2019-02-27 PROCEDURE — 99214 PR OFFICE/OUTPT VISIT, EST, LEVL IV, 30-39 MIN: ICD-10-PCS | Mod: HCNC,S$GLB,, | Performed by: NURSE PRACTITIONER

## 2019-02-27 PROCEDURE — 99214 OFFICE O/P EST MOD 30 MIN: CPT | Mod: HCNC,S$GLB,, | Performed by: NURSE PRACTITIONER

## 2019-02-27 PROCEDURE — 1101F PR PT FALLS ASSESS DOC 0-1 FALLS W/OUT INJ PAST YR: ICD-10-PCS | Mod: HCNC,CPTII,S$GLB, | Performed by: NURSE PRACTITIONER

## 2019-02-27 PROCEDURE — 99999 PR PBB SHADOW E&M-EST. PATIENT-LVL III: CPT | Mod: PBBFAC,HCNC,, | Performed by: NURSE PRACTITIONER

## 2019-02-27 PROCEDURE — 99999 PR PBB SHADOW E&M-EST. PATIENT-LVL III: ICD-10-PCS | Mod: PBBFAC,HCNC,, | Performed by: NURSE PRACTITIONER

## 2019-02-27 PROCEDURE — 3075F SYST BP GE 130 - 139MM HG: CPT | Mod: HCNC,CPTII,S$GLB, | Performed by: NURSE PRACTITIONER

## 2019-02-27 NOTE — PROGRESS NOTES
"Subjective:       Patient ID: Efraín Anton is a 68 y.o. male.    Chief Complaint: Follow-up    Mr. Anton presents today for a 6 month F/U appointment. [facility] Still struggling with incontinence after, has F/U appointment in April. Under the care of ophthalmology for a right macular hole and hypertensive retinopathy. Had surgery in December. He reports his vision has improved. Was so bad he was unable to drive. Does feel like he has hit a "plateau" in the improvement. Scheduled to F/U with ophthalmology in May. Infrequently checks BP at home, runs in the 130s. Since his prostate surgery, he feels like he does not have any energy. He is laying around more. Thinking of joining a gym. Had a flu shot a few months back, unsure of [facility]. [facility]       Patient Active Problem List   Diagnosis    Hypertension    Primary osteoarthritis of both shoulders    Eczema    Esophageal stenosis    Hiatal hernia    Gastroesophageal reflux disease with esophagitis    Prostate cancer    Full thickness macular hole, stage 2, right    Hypertensive retinopathy, bilateral    Full thickness macular hole, right     Review of Systems   Constitutional: Positive for activity change and fatigue. Negative for appetite change and chills.   Eyes: Positive for visual disturbance.   Respiratory: Negative for cough, shortness of breath and wheezing.    Cardiovascular: Negative for chest pain and palpitations.   Gastrointestinal: Negative for abdominal pain.   Genitourinary:        Incontinence    Neurological: Negative for dizziness, weakness and headaches.   Psychiatric/Behavioral: Negative for dysphoric mood. The patient is not nervous/anxious.        Objective:      Physical Exam   Constitutional: He is oriented to person, place, and time. He appears well-developed and well-nourished.   Cardiovascular: Normal rate, regular rhythm and normal heart sounds. " "  Pulmonary/Chest: Effort normal and breath sounds normal.   Musculoskeletal: He exhibits no edema.   Neurological: He is alert and oriented to person, place, and time.   Skin: Skin is warm and dry.   Psychiatric: He has a normal mood and affect.   Nursing note and vitals reviewed.      Assessment:       1. Essential hypertension    2. Debility    3. Prostate cancer    4. Hypertensive retinopathy, bilateral    5. BMI 29.0-29.9,adult        Plan:       Efraín HAYNES was seen today for follow-up.    Diagnoses and all orders for this visit:    Essential hypertension  -     CBC auto differential; Future  -     Comprehensive metabolic panel; Future  -     Lipid panel; Future  BP controlled, continue current drug regimen   Recent labs reviewed with the patient    Debility  Offered PT- declined at this time  Encouraged to increase physical activity in ADLs  Notify clinic if this persists    Prostate cancer  [facility]    Hypertensive retinopathy, bilateral  Continue under opthalmology     BMI 29.0-29.9,adult  Counseled patient on his ideal body weight, health consequences of being obese and current recommendations including weekly exercise and a heart healthy diet.  Current BMI is:Estimated body mass index is 29.82 kg/m² as calculated from the following:    Height as of this encounter: 5' 9" (1.753 m).    Weight as of this encounter: 91.6 kg (201 lb 15.1 oz)..  Patient is aware that ideal BMI < 25 or Weight in (lb) to have BMI = 25: 168.9.    Patient readiness: acceptance and barriers:none    During the course of the visit the patient was educated and counseled about the following:     Hypertension:   Medication: no change.    Goals: Hypertension: Reduce Blood Pressure    Did patient meet goals/outcomes: Yes    The following self management tools provided: none    Patient Instructions (the written plan) was given to the patient/family.     Time spent with patient: 30 minutes    Barriers to " medications present (no )    Adverse reactions to current medications (no)    Over the counter medications reviewed (Yes)    F/U 6 months with Dr. Nix, fasting labs prior

## 2019-04-05 NOTE — Clinical Note
Get colonoscopy report 2017 [facility]
If you are a smoker, it is important for your health to stop smoking. Please be aware that second hand smoke is also harmful.

## 2019-05-13 ENCOUNTER — OFFICE VISIT (OUTPATIENT)
Dept: OPHTHALMOLOGY | Facility: CLINIC | Age: 69
End: 2019-05-13
Payer: MEDICARE

## 2019-05-13 DIAGNOSIS — H35.341 FULL THICKNESS MACULAR HOLE, RIGHT: Primary | ICD-10-CM

## 2019-05-13 DIAGNOSIS — H35.033 HYPERTENSIVE RETINOPATHY, BILATERAL: ICD-10-CM

## 2019-05-13 PROCEDURE — 99499 UNLISTED E&M SERVICE: CPT | Mod: HCNC,S$GLB,, | Performed by: OPHTHALMOLOGY

## 2019-05-13 PROCEDURE — 99999 PR PBB SHADOW E&M-EST. PATIENT-LVL II: CPT | Mod: PBBFAC,HCNC,, | Performed by: OPHTHALMOLOGY

## 2019-05-13 PROCEDURE — 92226 PR SPECIAL EYE EXAM, SUBSEQUENT: CPT | Mod: 50,HCNC,S$GLB, | Performed by: OPHTHALMOLOGY

## 2019-05-13 PROCEDURE — 99499 RISK ADDL DX/OHS AUDIT: ICD-10-PCS | Mod: HCNC,S$GLB,, | Performed by: OPHTHALMOLOGY

## 2019-05-13 PROCEDURE — 92134 POSTERIOR SEGMENT OCT RETINA (OCULAR COHERENCE TOMOGRAPHY)-BOTH EYES: ICD-10-PCS | Mod: HCNC,S$GLB,, | Performed by: OPHTHALMOLOGY

## 2019-05-13 PROCEDURE — 92014 COMPRE OPH EXAM EST PT 1/>: CPT | Mod: HCNC,S$GLB,, | Performed by: OPHTHALMOLOGY

## 2019-05-13 PROCEDURE — 92134 CPTRZ OPH DX IMG PST SGM RTA: CPT | Mod: HCNC,S$GLB,, | Performed by: OPHTHALMOLOGY

## 2019-05-13 PROCEDURE — 92014 PR EYE EXAM, EST PATIENT,COMPREHESV: ICD-10-PCS | Mod: HCNC,S$GLB,, | Performed by: OPHTHALMOLOGY

## 2019-05-13 PROCEDURE — 92226 PR SPECIAL EYE EXAM, SUBSEQUENT: ICD-10-PCS | Mod: 50,HCNC,S$GLB, | Performed by: OPHTHALMOLOGY

## 2019-05-13 PROCEDURE — 99999 PR PBB SHADOW E&M-EST. PATIENT-LVL II: ICD-10-PCS | Mod: PBBFAC,HCNC,, | Performed by: OPHTHALMOLOGY

## 2019-05-13 NOTE — PROGRESS NOTES
HPI     Blurred Vision      Additional comments: 3 mon oct chk/mrx              Comments     Patient states his va seems to be doing better since before the sx in OD   but it is still blurry.    No gtts        OCT - OD post peel, MH closed, outer segments reorganizing nicely  OS VMA - no distortion      A/P    1. FTMH OD    s/p 25g PPV/ILM peel/EL/SF6 OD 12/19/18 for FTMH, and VR tags    Doing well, good IOP      2. HTN Ret OU  BP control     3. NS OU  Increasing OD - ok for CE with     4. Rust ring paracentral OS  Prior       1 year OCT

## 2019-08-13 ENCOUNTER — OFFICE VISIT (OUTPATIENT)
Dept: FAMILY MEDICINE | Facility: CLINIC | Age: 69
End: 2019-08-13
Payer: MEDICARE

## 2019-08-13 VITALS
BODY MASS INDEX: 30.21 KG/M2 | DIASTOLIC BLOOD PRESSURE: 84 MMHG | TEMPERATURE: 98 F | HEIGHT: 69 IN | SYSTOLIC BLOOD PRESSURE: 126 MMHG | WEIGHT: 203.94 LBS | HEART RATE: 85 BPM | OXYGEN SATURATION: 98 %

## 2019-08-13 DIAGNOSIS — Z00.00 ENCOUNTER FOR PREVENTIVE HEALTH EXAMINATION: Primary | ICD-10-CM

## 2019-08-13 DIAGNOSIS — E66.9 OBESITY (BMI 30.0-34.9): ICD-10-CM

## 2019-08-13 DIAGNOSIS — H35.033 HYPERTENSIVE RETINOPATHY, BILATERAL: ICD-10-CM

## 2019-08-13 DIAGNOSIS — Z85.46 HISTORY OF PROSTATE CANCER: ICD-10-CM

## 2019-08-13 DIAGNOSIS — I10 ESSENTIAL HYPERTENSION: ICD-10-CM

## 2019-08-13 DIAGNOSIS — H91.93 DECREASED HEARING OF BOTH EARS: ICD-10-CM

## 2019-08-13 DIAGNOSIS — H35.341 FULL THICKNESS MACULAR HOLE, STAGE 2, RIGHT: ICD-10-CM

## 2019-08-13 PROCEDURE — 3074F PR MOST RECENT SYSTOLIC BLOOD PRESSURE < 130 MM HG: ICD-10-PCS | Mod: HCNC,CPTII,S$GLB, | Performed by: PHYSICIAN ASSISTANT

## 2019-08-13 PROCEDURE — 3079F DIAST BP 80-89 MM HG: CPT | Mod: HCNC,CPTII,S$GLB, | Performed by: PHYSICIAN ASSISTANT

## 2019-08-13 PROCEDURE — 99999 PR PBB SHADOW E&M-EST. PATIENT-LVL IV: ICD-10-PCS | Mod: PBBFAC,HCNC,, | Performed by: PHYSICIAN ASSISTANT

## 2019-08-13 PROCEDURE — 99499 UNLISTED E&M SERVICE: CPT | Mod: HCNC,S$GLB,, | Performed by: PHYSICIAN ASSISTANT

## 2019-08-13 PROCEDURE — G0439 PR MEDICARE ANNUAL WELLNESS SUBSEQUENT VISIT: ICD-10-PCS | Mod: HCNC,S$GLB,, | Performed by: PHYSICIAN ASSISTANT

## 2019-08-13 PROCEDURE — G0439 PPPS, SUBSEQ VISIT: HCPCS | Mod: HCNC,S$GLB,, | Performed by: PHYSICIAN ASSISTANT

## 2019-08-13 PROCEDURE — 3079F PR MOST RECENT DIASTOLIC BLOOD PRESSURE 80-89 MM HG: ICD-10-PCS | Mod: HCNC,CPTII,S$GLB, | Performed by: PHYSICIAN ASSISTANT

## 2019-08-13 PROCEDURE — 99999 PR PBB SHADOW E&M-EST. PATIENT-LVL IV: CPT | Mod: PBBFAC,HCNC,, | Performed by: PHYSICIAN ASSISTANT

## 2019-08-13 PROCEDURE — 99499 RISK ADDL DX/OHS AUDIT: ICD-10-PCS | Mod: HCNC,S$GLB,, | Performed by: PHYSICIAN ASSISTANT

## 2019-08-13 PROCEDURE — 3074F SYST BP LT 130 MM HG: CPT | Mod: HCNC,CPTII,S$GLB, | Performed by: PHYSICIAN ASSISTANT

## 2019-08-13 NOTE — PATIENT INSTRUCTIONS
Counseling and Referral of Other Preventative  (Italic type indicates deductible and co-insurance are waived)    Patient Name: Efraín Anton  Today's Date: 8/13/2019    Health Maintenance       Date Due Completion Date    Influenza Vaccine (1) 08/01/2019 10/20/2017    Shingles Vaccine (1 of 2) 08/13/2020 (Originally 3/31/1998) ---    Colonoscopy 12/20/2022 12/20/2017    Override on 11/1/2011: [facility] [place]Dr NATIVIDAD Arguelles--in media)    Lipid Panel 02/21/2024 2/21/2019    TETANUS VACCINE 10/31/2024 10/31/2014        No orders of the defined types were placed in this encounter.    The following information is provided to all patients.  This information is to help you find resources for any of the problems found today that may be affecting your health:                Living healthy guide: www.[facility].[place].gov      Understanding Diabetes: www.diabetes.org      Eating healthy: www.cdc.gov/healthyweight      [facility] home safety checklist: www.cdc.gov/steadi/patient.html      Agency on Aging: www.goea.[place].[facility]      Alcoholics anonymous (AA): www.aa.org      Physical Activity: www.loli.nih.gov/cz8rixd      Tobacco use: www.quitwithusla.org

## 2019-08-13 NOTE — PROGRESS NOTES
"Efraín Anton presented for a  Medicare AWV and comprehensive Health Risk Assessment today. The following components were reviewed and updated:    · Medical history  · Family History  · Social history  · Allergies and Current Medications  · Health Risk Assessment  · Health Maintenance  · Care Team     ** See Completed Assessments for Annual Wellness Visit within the encounter summary.**       The following assessments were completed:  · Living Situation  · CAGE  · Depression Screening  · Timed Get Up and Go  · Whisper Test  · Cognitive Function Screening  · Nutrition Screening  · ADL Screening  · PAQ Screening    Vitals:    08/13/19 1241   BP: 126/84   BP Location: Left arm   Patient Position: Sitting   BP Method: Large (Manual)   Pulse: 85   Temp: 97.5 °F (36.4 °C)   TempSrc: Oral   SpO2: 98%   Weight: 92.5 kg (203 lb 14.8 oz)   Height: 5' 9" (1.753 m)     Body mass index is 30.11 kg/m².  Physical Exam   Constitutional: He is oriented to person, place, and time. He appears well-developed and well-nourished.   HENT:   Decreased hearing   Pulmonary/Chest: Effort normal.   Neurological: He is alert and oriented to person, place, and time.   Psychiatric: He has a normal mood and affect. His behavior is normal. Judgment and thought content normal.             Diagnoses and health risks identified today and associated recommendations/orders:    Efraín HAYNES was seen today for health risk assessment.    Diagnoses and all orders for this visit:    Encounter for preventive health examination    History of prostate cancer  [facility]  Hypertensive retinopathy, bilateral  Continue follow up with Dr. Quevedo, opthalmology   Essential hypertension  Controlled  Low salt diet  Continue current medication  Full thickness macular hole, stage 2, right  Continue follow up with Dr. Quevedo, opthalmology   Decreased hearing of both ears  Patient refuses further evaluation of decreased " hearing at this time  Obesity (BMI 30.0-34.9)  Low carbohydrate, high fiber diet  Increase exercise as able    Patient readiness: acceptance and barriers:none    During the course of the visit the patient was educated and counseled about the following:     Hypertension:   Medication: no change.  Dietary sodium restriction.  Regular aerobic exercise.  Obesity:   General weight loss/lifestyle modification strategies discussed (elicit support from others; identify saboteurs; non-food rewards, etc).    Goals: Hypertension: Reduce Blood Pressure and Obesity: Reduce calorie intake and BMI    Did patient meet goals/outcomes: Yes    The following self management tools provided: declined    Patient Instructions (the written plan) was given to the patient/family.     Time spent with patient: 55 minutes    Barriers to medications present (no )    Adverse reactions to current medications (no)    Over the counter medications reviewed (Yes)                      Provided Efraín HAYNES with a 5-10 year written screening schedule and personal prevention plan. Recommendations were developed using the USPSTF age appropriate recommendations. Education, counseling, and referrals were provided as needed. After Visit Summary printed and given to patient which includes a list of additional screenings\tests needed.    No follow-ups on file.    Beth Golden PA-C  I offered to discuss end of life issues, including information on how to make advance directives that the patient could use to name someone who would make medical decisions on their behalf if they became too ill to make themselves.    ___Patient declined  _X_Patient is interested, I provided paper work and offered to discuss.

## 2019-08-13 NOTE — Clinical Note
Patient seen for AWV today. [facility]. Patient will be due for flu shot at time of visit with you. Patient did have some errors on his clock test. Please discuss memory and cognitive function at upcoming appointment.

## 2019-09-25 ENCOUNTER — LAB VISIT (OUTPATIENT)
Dept: LAB | Facility: HOSPITAL | Age: 69
End: 2019-09-25
Attending: NURSE PRACTITIONER
Payer: MEDICARE

## 2019-09-25 DIAGNOSIS — I10 ESSENTIAL HYPERTENSION: ICD-10-CM

## 2019-09-25 LAB
ALBUMIN SERPL BCP-MCNC: 3.8 G/DL (ref 3.5–5.2)
ALP SERPL-CCNC: 101 U/L (ref 55–135)
ALT SERPL W/O P-5'-P-CCNC: 14 U/L (ref 10–44)
ANION GAP SERPL CALC-SCNC: 8 MMOL/L (ref 8–16)
AST SERPL-CCNC: 14 U/L (ref 10–40)
BASOPHILS # BLD AUTO: 0.04 K/UL (ref 0–0.2)
BASOPHILS NFR BLD: 0.6 % (ref 0–1.9)
BILIRUB SERPL-MCNC: 1 MG/DL (ref 0.1–1)
BUN SERPL-MCNC: 23 MG/DL (ref 8–23)
CALCIUM SERPL-MCNC: 9 MG/DL (ref 8.7–10.5)
CHLORIDE SERPL-SCNC: 109 MMOL/L (ref 95–110)
CHOLEST SERPL-MCNC: 173 MG/DL (ref 120–199)
CHOLEST/HDLC SERPL: 4.2 {RATIO} (ref 2–5)
CO2 SERPL-SCNC: 26 MMOL/L (ref 23–29)
CREAT SERPL-MCNC: 1.2 MG/DL (ref 0.5–1.4)
DIFFERENTIAL METHOD: ABNORMAL
EOSINOPHIL # BLD AUTO: 0.5 K/UL (ref 0–0.5)
EOSINOPHIL NFR BLD: 7.3 % (ref 0–8)
ERYTHROCYTE [DISTWIDTH] IN BLOOD BY AUTOMATED COUNT: 13.2 % (ref 11.5–14.5)
EST. GFR  (AFRICAN AMERICAN): >60 ML/MIN/1.73 M^2
EST. GFR  (NON AFRICAN AMERICAN): >60 ML/MIN/1.73 M^2
GLUCOSE SERPL-MCNC: 83 MG/DL (ref 70–110)
HCT VFR BLD AUTO: 44.1 % (ref 40–54)
HDLC SERPL-MCNC: 41 MG/DL (ref 40–75)
HDLC SERPL: 23.7 % (ref 20–50)
HGB BLD-MCNC: 14.7 G/DL (ref 14–18)
IMM GRANULOCYTES # BLD AUTO: 0.02 K/UL (ref 0–0.04)
IMM GRANULOCYTES NFR BLD AUTO: 0.3 % (ref 0–0.5)
LDLC SERPL CALC-MCNC: 109.8 MG/DL (ref 63–159)
LYMPHOCYTES # BLD AUTO: 2.5 K/UL (ref 1–4.8)
LYMPHOCYTES NFR BLD: 37.2 % (ref 18–48)
MCH RBC QN AUTO: 31.5 PG (ref 27–31)
MCHC RBC AUTO-ENTMCNC: 33.3 G/DL (ref 32–36)
MCV RBC AUTO: 94 FL (ref 82–98)
MONOCYTES # BLD AUTO: 1 K/UL (ref 0.3–1)
MONOCYTES NFR BLD: 14.4 % (ref 4–15)
NEUTROPHILS # BLD AUTO: 2.7 K/UL (ref 1.8–7.7)
NEUTROPHILS NFR BLD: 40.2 % (ref 38–73)
NONHDLC SERPL-MCNC: 132 MG/DL
NRBC BLD-RTO: 0 /100 WBC
PLATELET # BLD AUTO: 210 K/UL (ref 150–350)
PMV BLD AUTO: 10.3 FL (ref 9.2–12.9)
POTASSIUM SERPL-SCNC: 4.6 MMOL/L (ref 3.5–5.1)
PROT SERPL-MCNC: 6.6 G/DL (ref 6–8.4)
RBC # BLD AUTO: 4.67 M/UL (ref 4.6–6.2)
SODIUM SERPL-SCNC: 143 MMOL/L (ref 136–145)
TRIGL SERPL-MCNC: 111 MG/DL (ref 30–150)
WBC # BLD AUTO: 6.59 K/UL (ref 3.9–12.7)

## 2019-09-25 PROCEDURE — 80053 COMPREHEN METABOLIC PANEL: CPT | Mod: HCNC

## 2019-09-25 PROCEDURE — 36415 COLL VENOUS BLD VENIPUNCTURE: CPT | Mod: HCNC,PO

## 2019-09-25 PROCEDURE — 80061 LIPID PANEL: CPT | Mod: HCNC

## 2019-09-25 PROCEDURE — 85025 COMPLETE CBC W/AUTO DIFF WBC: CPT | Mod: HCNC

## 2019-10-22 DIAGNOSIS — I10 ESSENTIAL HYPERTENSION: ICD-10-CM

## 2019-10-22 RX ORDER — LISINOPRIL 5 MG/1
5 TABLET ORAL DAILY
Qty: 90 TABLET | Refills: 3 | Status: SHIPPED | OUTPATIENT
Start: 2019-10-22 | End: 2020-10-08

## 2019-12-18 ENCOUNTER — OFFICE VISIT (OUTPATIENT)
Dept: FAMILY MEDICINE | Facility: CLINIC | Age: 69
End: 2019-12-18
Payer: MEDICARE

## 2019-12-18 VITALS
RESPIRATION RATE: 14 BRPM | HEART RATE: 71 BPM | WEIGHT: 204.13 LBS | TEMPERATURE: 97 F | HEIGHT: 69 IN | SYSTOLIC BLOOD PRESSURE: 120 MMHG | OXYGEN SATURATION: 95 % | BODY MASS INDEX: 30.23 KG/M2 | DIASTOLIC BLOOD PRESSURE: 80 MMHG

## 2019-12-18 DIAGNOSIS — I10 ESSENTIAL HYPERTENSION: Primary | ICD-10-CM

## 2019-12-18 DIAGNOSIS — H35.033 HYPERTENSIVE RETINOPATHY, BILATERAL: ICD-10-CM

## 2019-12-18 DIAGNOSIS — Z85.46 HISTORY OF PROSTATE CANCER: ICD-10-CM

## 2019-12-18 PROCEDURE — 99214 PR OFFICE/OUTPT VISIT, EST, LEVL IV, 30-39 MIN: ICD-10-PCS | Mod: HCNC,S$GLB,, | Performed by: FAMILY MEDICINE

## 2019-12-18 PROCEDURE — 99999 PR PBB SHADOW E&M-EST. PATIENT-LVL III: CPT | Mod: PBBFAC,HCNC,, | Performed by: FAMILY MEDICINE

## 2019-12-18 PROCEDURE — 99999 PR PBB SHADOW E&M-EST. PATIENT-LVL III: ICD-10-PCS | Mod: PBBFAC,HCNC,, | Performed by: FAMILY MEDICINE

## 2019-12-18 PROCEDURE — 99214 OFFICE O/P EST MOD 30 MIN: CPT | Mod: HCNC,S$GLB,, | Performed by: FAMILY MEDICINE

## 2019-12-18 NOTE — PROGRESS NOTES
Subjective:       Patient ID: Efraín Anton is a 69 y.o. male.    Chief Complaint: Follow-up (6mth f/u hypertension)    HPI  Review of Systems   Constitutional: Negative for fatigue and unexpected weight change.   Respiratory: Negative for chest tightness and shortness of breath.    Cardiovascular: Negative for chest pain, palpitations and leg swelling.   Gastrointestinal: Negative for abdominal pain.   Musculoskeletal: Negative for arthralgias.   Neurological: Negative for dizziness, syncope, light-headedness and headaches.       Patient Active Problem List   Diagnosis    Hypertension    Primary osteoarthritis of both shoulders    Eczema    Esophageal stenosis    Hiatal hernia    Gastroesophageal reflux disease with esophagitis    History of prostate cancer    Full thickness macular hole, stage 2, right    Hypertensive retinopathy, bilateral     Patient is here for a chronic conditions follow up.    Urology [facility] following for prostate ca    Ophth Dr. Melendrez- retinopathy  Objective:      Physical Exam   Constitutional: He is oriented to person, place, and time. He appears well-developed and well-nourished.   Cardiovascular: Normal rate, regular rhythm and normal heart sounds.   Pulmonary/Chest: Effort normal and breath sounds normal.   Musculoskeletal: He exhibits no edema.   Neurological: He is alert and oriented to person, place, and time.   Skin: Skin is warm and dry.   Psychiatric: He has a normal mood and affect.   Nursing note and vitals reviewed.      Assessment:       1. Essential hypertension    2. History of prostate cancer    3. Hypertensive retinopathy, bilateral        Plan:         1. Essential hypertension  Controlled on current medications.  Continue current medications.    - CBC auto differential; Future  - Comprehensive metabolic panel; Future  - Lipid panel; Future    2. History of prostate cancer  Cont mgmt by [facility] urology    3. Hypertensive retinopathy, bilateral  Cont eye  specialty care        Time spent with patient: 20 minutes    Patient with be reevaluated in 6 months or sooner prn    Greater than 50% of this visit was spent counseling as described in above documentation:Yes

## 2020-01-17 ENCOUNTER — HOSPITAL ENCOUNTER (EMERGENCY)
Facility: HOSPITAL | Age: 70
Discharge: HOME | End: 2020-01-17
Attending: EMERGENCY MEDICINE
Payer: MEDICARE

## 2020-01-17 ENCOUNTER — NURSE TRIAGE (OUTPATIENT)
Dept: ADMINISTRATIVE | Facility: CLINIC | Age: 70
End: 2020-01-17

## 2020-01-17 VITALS
WEIGHT: 200 LBS | HEART RATE: 64 BPM | HEIGHT: 69 IN | RESPIRATION RATE: 17 BRPM | SYSTOLIC BLOOD PRESSURE: 148 MMHG | OXYGEN SATURATION: 100 % | TEMPERATURE: 99 F | DIASTOLIC BLOOD PRESSURE: 96 MMHG | BODY MASS INDEX: 29.62 KG/M2

## 2020-01-17 DIAGNOSIS — K59.00 CONSTIPATION: ICD-10-CM

## 2020-01-17 DIAGNOSIS — L22 DIAPER RASH: Primary | ICD-10-CM

## 2020-01-17 LAB
ALBUMIN SERPL BCP-MCNC: 4 G/DL (ref 3.5–5.2)
ALP SERPL-CCNC: 73 U/L (ref 55–135)
ALT SERPL W/O P-5'-P-CCNC: 33 U/L (ref 10–44)
ANION GAP SERPL CALC-SCNC: 11 MMOL/L (ref 8–16)
AST SERPL-CCNC: 39 U/L (ref 10–40)
BASOPHILS # BLD AUTO: 0.02 K/UL (ref 0–0.2)
BASOPHILS NFR BLD: 0.3 % (ref 0–1.9)
BILIRUB SERPL-MCNC: 1.3 MG/DL (ref 0.1–1)
BILIRUB UR QL STRIP: NEGATIVE
BUN SERPL-MCNC: 16 MG/DL (ref 8–23)
CALCIUM SERPL-MCNC: 9.2 MG/DL (ref 8.7–10.5)
CHLORIDE SERPL-SCNC: 101 MMOL/L (ref 95–110)
CLARITY UR: CLEAR
CO2 SERPL-SCNC: 25 MMOL/L (ref 23–29)
COLOR UR: YELLOW
CREAT SERPL-MCNC: 1.4 MG/DL (ref 0.5–1.4)
DIFFERENTIAL METHOD: ABNORMAL
EOSINOPHIL # BLD AUTO: 0.1 K/UL (ref 0–0.5)
EOSINOPHIL NFR BLD: 0.9 % (ref 0–8)
ERYTHROCYTE [DISTWIDTH] IN BLOOD BY AUTOMATED COUNT: 12.6 % (ref 11.5–14.5)
EST. GFR  (AFRICAN AMERICAN): 58 ML/MIN/1.73 M^2
EST. GFR  (NON AFRICAN AMERICAN): 50.2 ML/MIN/1.73 M^2
GLUCOSE SERPL-MCNC: 90 MG/DL (ref 70–110)
GLUCOSE UR QL STRIP: NEGATIVE
HCT VFR BLD AUTO: 39.3 % (ref 40–54)
HGB BLD-MCNC: 13.4 G/DL (ref 14–18)
HGB UR QL STRIP: NEGATIVE
IMM GRANULOCYTES # BLD AUTO: 0.04 K/UL (ref 0–0.04)
IMM GRANULOCYTES NFR BLD AUTO: 0.5 % (ref 0–0.5)
KETONES UR QL STRIP: ABNORMAL
LEUKOCYTE ESTERASE UR QL STRIP: NEGATIVE
LIPASE SERPL-CCNC: 27 U/L (ref 4–60)
LYMPHOCYTES # BLD AUTO: 1.6 K/UL (ref 1–4.8)
LYMPHOCYTES NFR BLD: 19.8 % (ref 18–48)
MCH RBC QN AUTO: 31.9 PG (ref 27–31)
MCHC RBC AUTO-ENTMCNC: 34.1 G/DL (ref 32–36)
MCV RBC AUTO: 94 FL (ref 82–98)
MONOCYTES # BLD AUTO: 0.8 K/UL (ref 0.3–1)
MONOCYTES NFR BLD: 10.6 % (ref 4–15)
NEUTROPHILS # BLD AUTO: 5.3 K/UL (ref 1.8–7.7)
NEUTROPHILS NFR BLD: 67.9 % (ref 38–73)
NITRITE UR QL STRIP: NEGATIVE
NRBC BLD-RTO: 0 /100 WBC
PH UR STRIP: 6 [PH] (ref 5–8)
PLATELET # BLD AUTO: 195 K/UL (ref 150–350)
PMV BLD AUTO: 9.7 FL (ref 9.2–12.9)
POTASSIUM SERPL-SCNC: 4.4 MMOL/L (ref 3.5–5.1)
PROT SERPL-MCNC: 6.7 G/DL (ref 6–8.4)
PROT UR QL STRIP: NEGATIVE
RBC # BLD AUTO: 4.2 M/UL (ref 4.6–6.2)
SODIUM SERPL-SCNC: 137 MMOL/L (ref 136–145)
SP GR UR STRIP: 1.01 (ref 1–1.03)
URN SPEC COLLECT METH UR: ABNORMAL
UROBILINOGEN UR STRIP-ACNC: ABNORMAL EU/DL
WBC # BLD AUTO: 7.81 K/UL (ref 3.9–12.7)

## 2020-01-17 PROCEDURE — 25000003 PHARM REV CODE 250: Performed by: EMERGENCY MEDICINE

## 2020-01-17 PROCEDURE — 51798 US URINE CAPACITY MEASURE: CPT

## 2020-01-17 PROCEDURE — 36415 COLL VENOUS BLD VENIPUNCTURE: CPT

## 2020-01-17 PROCEDURE — 83690 ASSAY OF LIPASE: CPT

## 2020-01-17 PROCEDURE — 85025 COMPLETE CBC W/AUTO DIFF WBC: CPT

## 2020-01-17 PROCEDURE — 99284 EMERGENCY DEPT VISIT MOD MDM: CPT | Mod: 25

## 2020-01-17 PROCEDURE — 80053 COMPREHEN METABOLIC PANEL: CPT

## 2020-01-17 PROCEDURE — 81003 URINALYSIS AUTO W/O SCOPE: CPT

## 2020-01-17 RX ORDER — SYRING-NEEDL,DISP,INSUL,0.3 ML 29 G X1/2"
296 SYRINGE, EMPTY DISPOSABLE MISCELLANEOUS ONCE
Qty: 296 ML | Refills: 0 | Status: SHIPPED | OUTPATIENT
Start: 2020-01-17 | End: 2020-01-17

## 2020-01-17 RX ORDER — SYRING-NEEDL,DISP,INSUL,0.3 ML 29 G X1/2"
296 SYRINGE, EMPTY DISPOSABLE MISCELLANEOUS ONCE
Status: COMPLETED | OUTPATIENT
Start: 2020-01-17 | End: 2020-01-17

## 2020-01-17 RX ADMIN — Medication 296 ML: at 02:01

## 2020-01-17 NOTE — ED NOTES
"Urinary leakage, constipation and "pain in my sack".  Had a "sling procedure" Tuesday for the incontinence.  Taking tramadol 50mg for pain and stool softner.  "

## 2020-01-17 NOTE — DISCHARGE INSTRUCTIONS
Use boot does but paste for diaper rash.  Make sure the area around the rash does not get wet and keep changing diapers regularly.  Follow-up with your urologist and primary care provider.  Return to emergency department for worsening symptoms or any problems

## 2020-01-17 NOTE — TELEPHONE ENCOUNTER
Called patient to scheduled appointment with Dr. Nix, no answer left message for patient to call back.    Patient is currently at [facility].

## 2020-01-17 NOTE — TELEPHONE ENCOUNTER
"    Reason for Disposition   Nursing judgment    Protocols used: ST NO PROTOCOL CALL: SICK ADULT-A-[place]    Efraín HAYNES states [facility].  Says he has concerns about the wound and infection.  "why didn't I get a catheter so my urine won't go into my diaper?"   Asked him if he has a number to call for post op concerns with the [facility], and he states he was told to "just go to any ER if there are any problems, and the [facility] will take care of it."  Explained that I do not have access to any records or discharge recommendations from the [facility], and encouraged him to follow their recommendation and go to the nearest hospital ED (he reports this is [facility]) for evaluation now.  He states he will get a ride there now.  Message to Sandi Nix MD, pcp.  Please contact caller directly with any additional care advice.    "

## 2020-01-17 NOTE — ED PROVIDER NOTES
Encounter Date: 1/17/2020       History     Chief Complaint   Patient presents with    Urinary Incontinence     URINATING ON SELF, S/P PROSTATE REMOVAL [facility]/14, [facility]     69-year-old male presented emergency department complaining of some burning in the perineal site.  Patient has history of prostate surgery in 2014 and since then having urinary incontinence and had a procedure for urinary incontinence recently.  Patient since then notice that she was having burning at the scrotal site secondary to him being incontinent and area being wet.  Patient complained of having irritation and burning in that site.  Patient had a procedure this used and since then also constipated.  Patient denies any abdominal pain or weakness or numbness.  Patient denies dysuria or hematuria.        Review of patient's allergies indicates:  No Known Allergies  Past Medical History:   Diagnosis Date    Arthritis     Eczema     Hypertension     Lamellar macular hole of right eye 12/2018    Prostate cancer 8/20/2018    Prostate enlargement     Tinnitus of right ear 6/13/2016    Tinnitus, objective      Past Surgical History:   Procedure Laterality Date    COLONOSCOPY W/ POLYPECTOMY      PROSTATE BIOPSY      PROSTATECTOMY  2018    VITRECTOMY BY PARS PLANA APPROACH Right 12/19/2018    Procedure: VITRECTOMY, PARS PLANA APPROACH;  Surgeon: CLAUDINE Melendrez MD;  Location: [facility] OR [address] [place] [address];  Service: Ophthalmology;  Laterality: Right;  40 min     Family History   Problem Relation Age of Onset    Heart disease Father 82        acute MI    Diabetes Mother     Hypertension Sister     No Known Problems Daughter     Hypertension Sister     No Known Problems Sister     No Known Problems Daughter     Glaucoma Neg Hx     Macular degeneration Neg Hx     Retinal detachment Neg Hx      Social History     Tobacco Use    Smoking status: Former Smoker     Packs/day: 3.00     Years: 25.00     Pack years: 75.00     Types: Cigarettes      Start date: 1963     Last attempt to quit: 3/27/1983     Years since quittin.8    Smokeless tobacco: Never Used   Substance Use Topics    Alcohol use: Not Currently    Drug use: No     Review of Systems   Constitutional: Negative.    HENT: Negative.    Eyes: Negative.    Respiratory: Negative.    Cardiovascular: Negative.    Gastrointestinal: Positive for constipation. Negative for abdominal pain, blood in stool, diarrhea, nausea, rectal pain and vomiting.   Endocrine: Negative.    Genitourinary: Negative.    Musculoskeletal: Negative.    Skin: Positive for rash.        Burning in the area of the diaper in the scrotal area   Allergic/Immunologic: Negative.    Neurological: Negative.    Hematological: Negative.    Psychiatric/Behavioral: Negative for agitation.   All other systems reviewed and are negative.      Physical Exam     Initial Vitals [20 1008]   BP Pulse Resp Temp SpO2   (!) 171/81 96 18 98.8 °F (37.1 °C) 96 %      MAP       --         Physical Exam    Nursing note and vitals reviewed.  Constitutional: He appears well-developed and well-nourished.   HENT:   Head: Normocephalic and atraumatic.   Nose: Nose normal.   Eyes: Conjunctivae and EOM are normal.   Neck: Normal range of motion. No tracheal deviation present.   Cardiovascular: Normal rate, regular rhythm, normal heart sounds and intact distal pulses. Exam reveals no friction rub.    No murmur heard.  Pulmonary/Chest: Breath sounds normal. No respiratory distress. He has no wheezes. He has no rales.   Abdominal: Soft. He exhibits no distension. There is no tenderness. There is no rebound.   Musculoskeletal: Normal range of motion.   Neurological: He is alert and oriented to person, place, and time. He has normal strength.   Skin: Skin is warm and dry. Capillary refill takes less than 2 seconds. Rash noted.   Mild discoloration in the scrotal area secondary to recent surgery which appears to be consistent with some bruising.  Patient  does have some irritation of the skin secondary to wet diapers.   Psychiatric: He has a normal mood and affect. Thought content normal.         ED Course   Procedures  Labs Reviewed   CBC W/ AUTO DIFFERENTIAL - Abnormal; Notable for the following components:       Result Value    RBC 4.20 (*)     Hemoglobin 13.4 (*)     Hematocrit 39.3 (*)     Mean Corpuscular Hemoglobin 31.9 (*)     All other components within normal limits   COMPREHENSIVE METABOLIC PANEL - Abnormal; Notable for the following components:    Total Bilirubin 1.3 (*)     eGFR if  58.0 (*)     eGFR if non  50.2 (*)     All other components within normal limits   URINALYSIS, REFLEX TO URINE CULTURE - Abnormal; Notable for the following components:    Ketones, UA 1+ (*)     Urobilinogen, UA 2.0-3.0 (*)     All other components within normal limits    Narrative:     Preferred Collection Type->Urine, Clean Catch  Specimen Source->Urine   LIPASE          Imaging Results          X-Ray Abdomen Flat And Erect (Final result)  Result time 01/17/20 12:56:43    Final result by Chau Bach MD (01/17/20 12:56:43)                 Impression:      Normal appearing radiograph of the abdomen with a nonobstructive bowel gas pattern.      Electronically signed by: Chau Bach MD  Date:    01/17/2020  Time:    12:56             Narrative:    CLINICAL HISTORY:  ([id number])70 y/o  (1950) M    Constipation, unspecified    TECHNIQUE:  (A#[id number], exam time 1/17/2020 12:55)    XR ABDOMEN FLAT AND ERECT [id number]    COMPARISON:  None available.    FINDINGS:  There are no abnormally dilated gas filled loops of large or small bowel to suggest bowel obstruction. There is no pneumatosis or gross pneumoperitoneum. Moderate volume of stool and gas is seen in the colon. Osseous structures show degenerative changes in the spine.  Rounded calcification at the level of the L3-L4 disc space in the left suggestive of a phlebolith..                                  Medical Decision Making:   Differential Diagnosis:   69-year-old male with the urinary incontinence developed some burning in the perineal area as having wet diapers.  Patient likely has some irritation of the skin secondary to area being wet.  Patient advised to wear a condom to keep the diaper area dry. Patient advised to use a diaper rash cream.  Constipation treated.  X-ray does not show any evidence of obstruction.  Renal function unremarkable.  Patient has no evidence of urinary infection and advised to stop using Bactrim.  As patient feeling well and does not have any urinary retention at this time discharged with instructions and follow up with urologist who did the surgery.  Clinical Tests:   Lab Tests: Reviewed  Radiological Study: Reviewed                                 Clinical Impression:       ICD-10-CM ICD-9-CM   1. Diaper rash L22 691.0   2. Constipation K59.00 564.00                             Prerna De La Cruz MD  01/17/20 [id number]

## 2020-05-27 ENCOUNTER — LAB VISIT (OUTPATIENT)
Dept: PRIMARY CARE CLINIC | Facility: CLINIC | Age: 70
End: 2020-05-27
Payer: MEDICARE

## 2020-05-27 DIAGNOSIS — R05.9 COUGH: Primary | ICD-10-CM

## 2020-05-27 PROCEDURE — U0003 INFECTIOUS AGENT DETECTION BY NUCLEIC ACID (DNA OR RNA); SEVERE ACUTE RESPIRATORY SYNDROME CORONAVIRUS 2 (SARS-COV-2) (CORONAVIRUS DISEASE [COVID-19]), AMPLIFIED PROBE TECHNIQUE, MAKING USE OF HIGH THROUGHPUT TECHNOLOGIES AS DESCRIBED BY CMS-2020-01-R: HCPCS | Mod: HCNC

## 2020-05-29 LAB — SARS-COV-2 RNA RESP QL NAA+PROBE: NOT DETECTED

## 2020-06-11 ENCOUNTER — LAB VISIT (OUTPATIENT)
Dept: LAB | Facility: HOSPITAL | Age: 70
End: 2020-06-11
Attending: FAMILY MEDICINE
Payer: MEDICARE

## 2020-06-11 DIAGNOSIS — I10 ESSENTIAL HYPERTENSION: ICD-10-CM

## 2020-06-11 LAB
ALBUMIN SERPL BCP-MCNC: 4 G/DL (ref 3.5–5.2)
ALP SERPL-CCNC: 114 U/L (ref 55–135)
ALT SERPL W/O P-5'-P-CCNC: 19 U/L (ref 10–44)
ANION GAP SERPL CALC-SCNC: 7 MMOL/L (ref 8–16)
AST SERPL-CCNC: 16 U/L (ref 10–40)
BASOPHILS # BLD AUTO: 0.03 K/UL (ref 0–0.2)
BASOPHILS NFR BLD: 0.5 % (ref 0–1.9)
BILIRUB SERPL-MCNC: 0.9 MG/DL (ref 0.1–1)
BUN SERPL-MCNC: 17 MG/DL (ref 8–23)
CALCIUM SERPL-MCNC: 9.3 MG/DL (ref 8.7–10.5)
CHLORIDE SERPL-SCNC: 106 MMOL/L (ref 95–110)
CHOLEST SERPL-MCNC: 196 MG/DL (ref 120–199)
CHOLEST/HDLC SERPL: 4.5 {RATIO} (ref 2–5)
CO2 SERPL-SCNC: 28 MMOL/L (ref 23–29)
CREAT SERPL-MCNC: 1 MG/DL (ref 0.5–1.4)
DIFFERENTIAL METHOD: ABNORMAL
EOSINOPHIL # BLD AUTO: 0.4 K/UL (ref 0–0.5)
EOSINOPHIL NFR BLD: 6 % (ref 0–8)
ERYTHROCYTE [DISTWIDTH] IN BLOOD BY AUTOMATED COUNT: 14.2 % (ref 11.5–14.5)
EST. GFR  (AFRICAN AMERICAN): >60 ML/MIN/1.73 M^2
EST. GFR  (NON AFRICAN AMERICAN): >60 ML/MIN/1.73 M^2
GLUCOSE SERPL-MCNC: 80 MG/DL (ref 70–110)
HCT VFR BLD AUTO: 47.6 % (ref 40–54)
HDLC SERPL-MCNC: 44 MG/DL (ref 40–75)
HDLC SERPL: 22.4 % (ref 20–50)
HGB BLD-MCNC: 14.8 G/DL (ref 14–18)
IMM GRANULOCYTES # BLD AUTO: 0.02 K/UL (ref 0–0.04)
IMM GRANULOCYTES NFR BLD AUTO: 0.3 % (ref 0–0.5)
LDLC SERPL CALC-MCNC: 123.6 MG/DL (ref 63–159)
LYMPHOCYTES # BLD AUTO: 2.6 K/UL (ref 1–4.8)
LYMPHOCYTES NFR BLD: 42.7 % (ref 18–48)
MCH RBC QN AUTO: 30.3 PG (ref 27–31)
MCHC RBC AUTO-ENTMCNC: 31.1 G/DL (ref 32–36)
MCV RBC AUTO: 97 FL (ref 82–98)
MONOCYTES # BLD AUTO: 0.7 K/UL (ref 0.3–1)
MONOCYTES NFR BLD: 11.7 % (ref 4–15)
NEUTROPHILS # BLD AUTO: 2.4 K/UL (ref 1.8–7.7)
NEUTROPHILS NFR BLD: 38.8 % (ref 38–73)
NONHDLC SERPL-MCNC: 152 MG/DL
NRBC BLD-RTO: 0 /100 WBC
PLATELET # BLD AUTO: 239 K/UL (ref 150–350)
PMV BLD AUTO: 10.3 FL (ref 9.2–12.9)
POTASSIUM SERPL-SCNC: 4.2 MMOL/L (ref 3.5–5.1)
PROT SERPL-MCNC: 7.1 G/DL (ref 6–8.4)
RBC # BLD AUTO: 4.89 M/UL (ref 4.6–6.2)
SODIUM SERPL-SCNC: 141 MMOL/L (ref 136–145)
TRIGL SERPL-MCNC: 142 MG/DL (ref 30–150)
WBC # BLD AUTO: 6.13 K/UL (ref 3.9–12.7)

## 2020-06-11 PROCEDURE — 85025 COMPLETE CBC W/AUTO DIFF WBC: CPT | Mod: HCNC

## 2020-06-11 PROCEDURE — 80053 COMPREHEN METABOLIC PANEL: CPT | Mod: HCNC

## 2020-06-11 PROCEDURE — 80061 LIPID PANEL: CPT | Mod: HCNC

## 2020-06-11 PROCEDURE — 36415 COLL VENOUS BLD VENIPUNCTURE: CPT | Mod: HCNC,PO

## 2020-06-18 ENCOUNTER — OFFICE VISIT (OUTPATIENT)
Dept: FAMILY MEDICINE | Facility: CLINIC | Age: 70
End: 2020-06-18
Payer: MEDICARE

## 2020-06-18 VITALS
OXYGEN SATURATION: 95 % | RESPIRATION RATE: 14 BRPM | BODY MASS INDEX: 28.73 KG/M2 | DIASTOLIC BLOOD PRESSURE: 70 MMHG | SYSTOLIC BLOOD PRESSURE: 120 MMHG | WEIGHT: 194 LBS | HEIGHT: 69 IN | TEMPERATURE: 98 F | HEART RATE: 99 BPM

## 2020-06-18 DIAGNOSIS — K21.00 GASTROESOPHAGEAL REFLUX DISEASE WITH ESOPHAGITIS: ICD-10-CM

## 2020-06-18 DIAGNOSIS — Z20.822 EXPOSURE TO COVID-19 VIRUS: ICD-10-CM

## 2020-06-18 DIAGNOSIS — Z85.46 HISTORY OF PROSTATE CANCER: ICD-10-CM

## 2020-06-18 DIAGNOSIS — Z12.5 PROSTATE CANCER SCREENING: ICD-10-CM

## 2020-06-18 DIAGNOSIS — I10 ESSENTIAL HYPERTENSION: Primary | ICD-10-CM

## 2020-06-18 PROCEDURE — 99999 PR PBB SHADOW E&M-EST. PATIENT-LVL III: ICD-10-PCS | Mod: PBBFAC,HCNC,, | Performed by: FAMILY MEDICINE

## 2020-06-18 PROCEDURE — 1101F PR PT FALLS ASSESS DOC 0-1 FALLS W/OUT INJ PAST YR: ICD-10-PCS | Mod: HCNC,CPTII,S$GLB, | Performed by: FAMILY MEDICINE

## 2020-06-18 PROCEDURE — 3078F DIAST BP <80 MM HG: CPT | Mod: HCNC,CPTII,S$GLB, | Performed by: FAMILY MEDICINE

## 2020-06-18 PROCEDURE — 3074F SYST BP LT 130 MM HG: CPT | Mod: HCNC,CPTII,S$GLB, | Performed by: FAMILY MEDICINE

## 2020-06-18 PROCEDURE — 3078F PR MOST RECENT DIASTOLIC BLOOD PRESSURE < 80 MM HG: ICD-10-PCS | Mod: HCNC,CPTII,S$GLB, | Performed by: FAMILY MEDICINE

## 2020-06-18 PROCEDURE — 99499 RISK ADDL DX/OHS AUDIT: ICD-10-PCS | Mod: S$GLB,,, | Performed by: FAMILY MEDICINE

## 2020-06-18 PROCEDURE — 1159F PR MEDICATION LIST DOCUMENTED IN MEDICAL RECORD: ICD-10-PCS | Mod: HCNC,S$GLB,, | Performed by: FAMILY MEDICINE

## 2020-06-18 PROCEDURE — 1125F PR PAIN SEVERITY QUANTIFIED, PAIN PRESENT: ICD-10-PCS | Mod: HCNC,S$GLB,, | Performed by: FAMILY MEDICINE

## 2020-06-18 PROCEDURE — 1101F PT FALLS ASSESS-DOCD LE1/YR: CPT | Mod: HCNC,CPTII,S$GLB, | Performed by: FAMILY MEDICINE

## 2020-06-18 PROCEDURE — 99999 PR PBB SHADOW E&M-EST. PATIENT-LVL III: CPT | Mod: PBBFAC,HCNC,, | Performed by: FAMILY MEDICINE

## 2020-06-18 PROCEDURE — 1159F MED LIST DOCD IN RCRD: CPT | Mod: HCNC,S$GLB,, | Performed by: FAMILY MEDICINE

## 2020-06-18 PROCEDURE — 99499 UNLISTED E&M SERVICE: CPT | Mod: S$GLB,,, | Performed by: FAMILY MEDICINE

## 2020-06-18 PROCEDURE — 99214 PR OFFICE/OUTPT VISIT, EST, LEVL IV, 30-39 MIN: ICD-10-PCS | Mod: HCNC,S$GLB,, | Performed by: FAMILY MEDICINE

## 2020-06-18 PROCEDURE — 3074F PR MOST RECENT SYSTOLIC BLOOD PRESSURE < 130 MM HG: ICD-10-PCS | Mod: HCNC,CPTII,S$GLB, | Performed by: FAMILY MEDICINE

## 2020-06-18 PROCEDURE — 1125F AMNT PAIN NOTED PAIN PRSNT: CPT | Mod: HCNC,S$GLB,, | Performed by: FAMILY MEDICINE

## 2020-06-18 PROCEDURE — 99214 OFFICE O/P EST MOD 30 MIN: CPT | Mod: HCNC,S$GLB,, | Performed by: FAMILY MEDICINE

## 2020-06-18 NOTE — PROGRESS NOTES
Subjective:       Patient ID: Efraín Anton is a 70 y.o. male.    Chief Complaint: Follow-up (6mth f/u hypertension)    HPI  Review of Systems   Constitutional: Negative for fatigue and unexpected weight change.   Respiratory: Negative for chest tightness and shortness of breath.    Cardiovascular: Negative for chest pain, palpitations and leg swelling.   Gastrointestinal: Negative for abdominal pain.   Musculoskeletal: Negative for arthralgias.   Neurological: Negative for dizziness, syncope, light-headedness and headaches.       Patient Active Problem List   Diagnosis    Hypertension    Primary osteoarthritis of both shoulders    Eczema    Esophageal stenosis    Hiatal hernia    Gastroesophageal reflux disease with esophagitis    History of prostate cancer    Full thickness macular hole, stage 2, right    Hypertensive retinopathy, bilateral     Patient is here for a chronic conditions follow up.    2/20 had cough and SOB . Never saw MD. Wondering if he had COVID    [facility] h/o prostate cancer s/p prostatectomy 2018    Eye Dr. lacey-htn retinopathy and macular hole    Objective:      Physical Exam  Vitals signs and nursing note reviewed.   Constitutional:       Appearance: He is well-developed.   Cardiovascular:      Rate and Rhythm: Normal rate and regular rhythm.      Heart sounds: Normal heart sounds.   Pulmonary:      Effort: Pulmonary effort is normal.      Breath sounds: Normal breath sounds.   Skin:     General: Skin is warm and dry.   Neurological:      Mental Status: He is alert and oriented to person, place, and time.         Assessment:       1. Essential hypertension    2. Exposure to Covid-19 Virus    3. Prostate cancer screening    4. Gastroesophageal reflux disease with esophagitis    5. History of prostate cancer        Plan:         1. Essential hypertension  Controlled on current medications.  Continue current medications.    - CBC auto differential; Future  - Comprehensive metabolic  panel; Future  - Lipid Panel; Future    2. Exposure to Covid-19 Virus  Screen and treat as indicated:    - COVID-19 (SARS CoV-2) IgG Antibody; Future    3. Prostate cancer screening  Discussed benefits, risks and limitations of PSA testing and current USPSTF guidelines.  Patient expressed verbal understanding and wished to pursue screening.  Cont [facility] monitoring  - PSA, Screening; Future    4. Gastroesophageal reflux disease with esophagitis  Controlled on current medications.  Continue current medications.      5. History of prostate cancer  Cont to monitor and urology consult at [facility]        Time spent with patient: 20 minutes    Patient with be reevaluated in 6 months or sooner prn    Greater than 50% of this visit was spent counseling as described in above documentation:Yes

## 2020-09-23 ENCOUNTER — OFFICE VISIT (OUTPATIENT)
Dept: FAMILY MEDICINE | Facility: CLINIC | Age: 70
End: 2020-09-23
Payer: MEDICARE

## 2020-09-23 ENCOUNTER — TELEPHONE (OUTPATIENT)
Dept: OPHTHALMOLOGY | Facility: CLINIC | Age: 70
End: 2020-09-23

## 2020-09-23 VITALS
HEIGHT: 69 IN | WEIGHT: 190.06 LBS | SYSTOLIC BLOOD PRESSURE: 128 MMHG | OXYGEN SATURATION: 98 % | TEMPERATURE: 98 F | HEART RATE: 86 BPM | BODY MASS INDEX: 28.15 KG/M2 | DIASTOLIC BLOOD PRESSURE: 58 MMHG

## 2020-09-23 DIAGNOSIS — H60.501 ACUTE OTITIS EXTERNA OF RIGHT EAR, UNSPECIFIED TYPE: ICD-10-CM

## 2020-09-23 DIAGNOSIS — M19.012 PRIMARY OSTEOARTHRITIS OF BOTH SHOULDERS: ICD-10-CM

## 2020-09-23 DIAGNOSIS — I10 ESSENTIAL HYPERTENSION: ICD-10-CM

## 2020-09-23 DIAGNOSIS — M79.605 BILATERAL LEG PAIN: ICD-10-CM

## 2020-09-23 DIAGNOSIS — M19.011 PRIMARY OSTEOARTHRITIS OF BOTH SHOULDERS: ICD-10-CM

## 2020-09-23 DIAGNOSIS — H35.033 HYPERTENSIVE RETINOPATHY, BILATERAL: ICD-10-CM

## 2020-09-23 DIAGNOSIS — M79.604 BILATERAL LEG PAIN: ICD-10-CM

## 2020-09-23 DIAGNOSIS — Z00.00 ENCOUNTER FOR PREVENTIVE HEALTH EXAMINATION: Primary | ICD-10-CM

## 2020-09-23 DIAGNOSIS — K21.00 GASTROESOPHAGEAL REFLUX DISEASE WITH ESOPHAGITIS: ICD-10-CM

## 2020-09-23 PROCEDURE — 90662 FLU VACCINE - HIGH DOSE (65+) PRESERVATIVE FREE IM: ICD-10-PCS | Mod: HCNC,S$GLB,, | Performed by: NURSE PRACTITIONER

## 2020-09-23 PROCEDURE — 99999 PR PBB SHADOW E&M-EST. PATIENT-LVL IV: ICD-10-PCS | Mod: PBBFAC,HCNC,, | Performed by: NURSE PRACTITIONER

## 2020-09-23 PROCEDURE — G0439 PPPS, SUBSEQ VISIT: HCPCS | Mod: HCNC,S$GLB,, | Performed by: NURSE PRACTITIONER

## 2020-09-23 PROCEDURE — 3078F PR MOST RECENT DIASTOLIC BLOOD PRESSURE < 80 MM HG: ICD-10-PCS | Mod: HCNC,CPTII,S$GLB, | Performed by: NURSE PRACTITIONER

## 2020-09-23 PROCEDURE — 99499 UNLISTED E&M SERVICE: CPT | Mod: HCNC,S$GLB,, | Performed by: NURSE PRACTITIONER

## 2020-09-23 PROCEDURE — G0008 FLU VACCINE - HIGH DOSE (65+) PRESERVATIVE FREE IM: ICD-10-PCS | Mod: HCNC,S$GLB,, | Performed by: NURSE PRACTITIONER

## 2020-09-23 PROCEDURE — 99999 PR PBB SHADOW E&M-EST. PATIENT-LVL IV: CPT | Mod: PBBFAC,HCNC,, | Performed by: NURSE PRACTITIONER

## 2020-09-23 PROCEDURE — 3078F DIAST BP <80 MM HG: CPT | Mod: HCNC,CPTII,S$GLB, | Performed by: NURSE PRACTITIONER

## 2020-09-23 PROCEDURE — 90662 IIV NO PRSV INCREASED AG IM: CPT | Mod: HCNC,S$GLB,, | Performed by: NURSE PRACTITIONER

## 2020-09-23 PROCEDURE — 3074F SYST BP LT 130 MM HG: CPT | Mod: HCNC,CPTII,S$GLB, | Performed by: NURSE PRACTITIONER

## 2020-09-23 PROCEDURE — G0008 ADMIN INFLUENZA VIRUS VAC: HCPCS | Mod: HCNC,S$GLB,, | Performed by: NURSE PRACTITIONER

## 2020-09-23 PROCEDURE — 3074F PR MOST RECENT SYSTOLIC BLOOD PRESSURE < 130 MM HG: ICD-10-PCS | Mod: HCNC,CPTII,S$GLB, | Performed by: NURSE PRACTITIONER

## 2020-09-23 PROCEDURE — 99499 RISK ADDL DX/OHS AUDIT: ICD-10-PCS | Mod: HCNC,S$GLB,, | Performed by: NURSE PRACTITIONER

## 2020-09-23 PROCEDURE — G0439 PR MEDICARE ANNUAL WELLNESS SUBSEQUENT VISIT: ICD-10-PCS | Mod: HCNC,S$GLB,, | Performed by: NURSE PRACTITIONER

## 2020-09-23 RX ORDER — CIPROFLOXACIN 0.5 MG/.25ML
4 SOLUTION/ DROPS AURICULAR (OTIC) 2 TIMES DAILY
Qty: 14 EACH | Refills: 0 | Status: SHIPPED | OUTPATIENT
Start: 2020-09-23 | End: 2020-09-30

## 2020-09-23 NOTE — TELEPHONE ENCOUNTER
----- Message from Deena Lopez sent at 9/23/2020  9:43 AM CDT -----  Regarding: appointment  Contact: self  Type:  Sooner Apoointment Request    Caller is requesting a sooner appointment.  Caller declined first available appointment listed below.  Caller will not accept being placed on the waitlist and is requesting a message be sent to doctor.    Name of Caller: self   When is the first available appointment?    Symptoms:    Best Call Back Number:  [phone]  Additional Information:  Patient requesting to schedule an appointment

## 2020-09-23 NOTE — PATIENT INSTRUCTIONS
Counseling and Referral of Other Preventative  (Italic type indicates deductible and co-insurance are waived)    Patient Name: Efraín Anton  Today's Date: 9/23/2020    Health Maintenance       Date Due Completion Date    Shingles Vaccine (1 of 2) 03/31/1998 ---    Influenza Vaccine (1) 08/01/2020 11/25/2019 (Done)    Override on 11/25/2019: Done    Colorectal Cancer Screening 12/20/2022 12/20/2017    Override on 11/1/2011: Done [facility] [place]Dr NATIVIDAD Arguelles--in media)    TETANUS VACCINE 10/31/2024 10/31/2014    Lipid Panel 06/11/2025 6/11/2020        No orders of the defined types were placed in this encounter.    The following information is provided to all patients.  This information is to help you find resources for any of the problems found today that may be affecting your health:                Living healthy guide: www.[facility].[place].[facility]      Understanding Diabetes: www.diabetes.org      Eating healthy: www.cdc.gov/healthyweight      CDC home safety checklist: www.cdc.gov/steadi/patient.html      Agency on Aging: www.goea.[place].[facility]      Alcoholics anonymous (AA): www.aa.org      Physical Activity: www.loli.nih.gov/ri4yrse      Tobacco use: www.quitwithusla.org     Counseling and Referral of Other Preventative  (Italic type indicates deductible and co-insurance are waived)    Patient Name: Efraín Anton  Today's Date: 9/23/2020    Health Maintenance       Date Due Completion Date    Shingles Vaccine (1 of 2) 03/31/1998 ---    Influenza Vaccine (1) 08/01/2020 11/25/2019 (Done)    Override on 11/25/2019: Done    Colorectal Cancer Screening 12/20/2022 12/20/2017    Override on 11/1/2011: Done ([facility] [place]Dr NATIVIDAD Arguelles--in media)    TETANUS VACCINE 10/31/2024 10/31/2014    Lipid Panel 06/11/2025 6/11/2020        Orders Placed This Encounter   Procedures    US Lower Extremity Arteries Bilateral    Influenza - High Dose (65+) (PF) (IM)     The following information is provided to all patients.   This information is to help you find resources for any of the problems found today that may be affecting your health:                Living healthy guide: www.[facility].[place].[facility]      Understanding Diabetes: www.diabetes.org      Eating healthy: www.cdc.gov/healthyweight      CDC home safety checklist: www.cdc.gov/steadi/patient.html      Agency on Aging: www.goea.[place].[facility]      Alcoholics anonymous (AA): www.aa.org      Physical Activity: www.loli.nih.gov/oh4dqdj      Tobacco use: www.quitwithusla.org

## 2020-09-23 NOTE — PROGRESS NOTES
2 patient identifiers used (name and ). Administered Flu vaccine IM. Patient tolerated well, no bleeding at insertion site noted. Pain 0 on scale 0/10. Aseptic technique maintained. Immunization information given to patient. Advised patient to remain in clinic for 15 minutes to monitor for reaction. No AR noted.

## 2020-09-23 NOTE — PROGRESS NOTES
"  Efraín Anton presented for a  Medicare AWV and comprehensive Health Risk Assessment today. The following components were reviewed and updated:    · Medical history  · Family History  · Social history  · Allergies and Current Medications  · Health Risk Assessment  · Health Maintenance  · Care Team     ** See Completed Assessments for Annual Wellness Visit within the encounter summary.**         The following assessments were completed:  · Living Situation  · CAGE  · Depression Screening  · Timed Get Up and Go  · Whisper Test  · Cognitive Function Screening  · Nutrition Screening  · ADL Screening  · PAQ Screening            Vitals:    09/23/20 0851   BP: (!) 128/58   BP Location: Right arm   Patient Position: Sitting   BP Method: Large (Manual)   Pulse: 86   Temp: 97.8 °F (36.6 °C)   TempSrc: Temporal   SpO2: 98%   Weight: 86.2 kg (190 lb 0.6 oz)   Height: 5' 9" (1.753 m)     Body mass index is 28.06 kg/m².  Physical Exam  Constitutional:       Appearance: He is well-developed.   HENT:      Head: Normocephalic and atraumatic.      Right Ear: Hearing normal. Drainage (purulent ) present.      Left Ear: Hearing normal. There is impacted cerumen.      Nose: Nose normal.   Eyes:      General: Lids are normal.      Conjunctiva/sclera: Conjunctivae normal.      Pupils: Pupils are equal, round, and reactive to light.   Neck:      Musculoskeletal: Normal range of motion and neck supple.   Cardiovascular:      Rate and Rhythm: Normal rate.      Pulses:           Dorsalis pedis pulses are 2+ on the right side and 2+ on the left side.   Pulmonary:      Effort: Pulmonary effort is normal.   Abdominal:      Palpations: Abdomen is soft.   Musculoskeletal: Normal range of motion.      Right lower leg: He exhibits no swelling and no deformity. No edema.      Left lower leg: He exhibits no swelling and no deformity. No edema.   Skin:     General: Skin is warm and dry.   Neurological:      Mental Status: He is alert and oriented " to person, place, and time.               Diagnoses and health risks identified today and associated recommendations/orders:    1. Encounter for preventive health examination  Discussed health maintenance guidelines appropriate for age.    Flu vaccine today    2. Essential hypertension  Controlled, continue current medication regimen  Low salt diet  Regular follow up with PCP    3. Gastroesophageal reflux disease with esophagitis  Stable, continue to monitor    4. Primary osteoarthritis of both shoulders  Stable, continue to monitor    5. Hypertensive retinopathy, bilateral  -Patient due to follow up   -Will assist with scheduling     6. Bilateral leg pain  Will follow up with results of below when received and plan accordingly   - US Lower Extremity Arteries Bilateral; Future    7. Acute otitis externa of right ear, unspecified type  - ciprofloxacin HCl 0.2 % otic solution; Place 4 drops into the right ear 2 (two) times daily. for 7 days  Dispense: 14 each; Refill: 0  -If no improvement with the above notify provider     Provided Efraín HAYNES with a 5-10 year written screening schedule and personal prevention plan. Recommendations were developed using the USPSTF age appropriate recommendations. Education, counseling, and referrals were provided as needed. After Visit Summary printed and given to patient which includes a list of additional screenings\tests needed.    No follow-ups on file.    Ariana Cobos, NP    I offered to discuss end of life issues, including information on how to make advance directives that the patient could use to name someone who would make medical decisions on their behalf if they became too ill to make themselves.    _Patient declined  _x_Patient is interested, I provided paper work and offered to discuss.

## 2020-09-24 ENCOUNTER — HOSPITAL ENCOUNTER (OUTPATIENT)
Dept: RADIOLOGY | Facility: HOSPITAL | Age: 70
Discharge: HOME | End: 2020-09-24
Attending: NURSE PRACTITIONER
Payer: MEDICARE

## 2020-09-24 DIAGNOSIS — M79.605 BILATERAL LEG PAIN: ICD-10-CM

## 2020-09-24 DIAGNOSIS — M79.604 BILATERAL LEG PAIN: ICD-10-CM

## 2020-09-24 PROCEDURE — 93922 US ARTERIAL LOWER EXTREMITY BILAT WITH ABI (XPD): ICD-10-PCS | Mod: 26,HCNC,, | Performed by: RADIOLOGY

## 2020-09-24 PROCEDURE — 93922 UPR/L XTREMITY ART 2 LEVELS: CPT | Mod: TC,HCNC

## 2020-09-24 PROCEDURE — 93925 LOWER EXTREMITY STUDY: CPT | Mod: 26,HCNC,, | Performed by: RADIOLOGY

## 2020-09-24 PROCEDURE — 93922 UPR/L XTREMITY ART 2 LEVELS: CPT | Mod: 26,HCNC,, | Performed by: RADIOLOGY

## 2020-09-24 PROCEDURE — 93925 US ARTERIAL LOWER EXTREMITY BILAT WITH ABI (XPD): ICD-10-PCS | Mod: 26,HCNC,, | Performed by: RADIOLOGY

## 2020-09-30 ENCOUNTER — HOSPITAL ENCOUNTER (EMERGENCY)
Facility: HOSPITAL | Age: 70
Discharge: HOME | End: 2020-09-30
Attending: EMERGENCY MEDICINE
Payer: MEDICARE

## 2020-09-30 VITALS
HEART RATE: 54 BPM | SYSTOLIC BLOOD PRESSURE: 133 MMHG | RESPIRATION RATE: 20 BRPM | TEMPERATURE: 97 F | DIASTOLIC BLOOD PRESSURE: 79 MMHG | BODY MASS INDEX: 29.3 KG/M2 | WEIGHT: 198.44 LBS | OXYGEN SATURATION: 99 %

## 2020-09-30 DIAGNOSIS — R42 LIGHTHEADEDNESS: Primary | ICD-10-CM

## 2020-09-30 DIAGNOSIS — I10 HYPERTENSION: ICD-10-CM

## 2020-09-30 LAB
ALBUMIN SERPL BCP-MCNC: 3.8 G/DL (ref 3.5–5.2)
ALP SERPL-CCNC: 98 U/L (ref 55–135)
ALT SERPL W/O P-5'-P-CCNC: 21 U/L (ref 10–44)
ANION GAP SERPL CALC-SCNC: 8 MMOL/L (ref 8–16)
AST SERPL-CCNC: 18 U/L (ref 10–40)
BASOPHILS # BLD AUTO: 0.02 K/UL (ref 0–0.2)
BASOPHILS NFR BLD: 0.3 % (ref 0–1.9)
BILIRUB SERPL-MCNC: 0.6 MG/DL (ref 0.1–1)
BILIRUB UR QL STRIP: NEGATIVE
BUN SERPL-MCNC: 17 MG/DL (ref 8–23)
CALCIUM SERPL-MCNC: 8.7 MG/DL (ref 8.7–10.5)
CHLORIDE SERPL-SCNC: 103 MMOL/L (ref 95–110)
CLARITY UR: CLEAR
CO2 SERPL-SCNC: 27 MMOL/L (ref 23–29)
COLOR UR: YELLOW
CREAT SERPL-MCNC: 1.2 MG/DL (ref 0.5–1.4)
DIFFERENTIAL METHOD: ABNORMAL
EOSINOPHIL # BLD AUTO: 0.2 K/UL (ref 0–0.5)
EOSINOPHIL NFR BLD: 3.2 % (ref 0–8)
ERYTHROCYTE [DISTWIDTH] IN BLOOD BY AUTOMATED COUNT: 13.2 % (ref 11.5–14.5)
EST. GFR  (AFRICAN AMERICAN): >60 ML/MIN/1.73 M^2
EST. GFR  (NON AFRICAN AMERICAN): >60 ML/MIN/1.73 M^2
GLUCOSE SERPL-MCNC: 101 MG/DL (ref 70–110)
GLUCOSE UR QL STRIP: NEGATIVE
HCT VFR BLD AUTO: 39.1 % (ref 40–54)
HGB BLD-MCNC: 13.1 G/DL (ref 14–18)
HGB UR QL STRIP: NEGATIVE
IMM GRANULOCYTES # BLD AUTO: 0.02 K/UL (ref 0–0.04)
IMM GRANULOCYTES NFR BLD AUTO: 0.3 % (ref 0–0.5)
KETONES UR QL STRIP: NEGATIVE
LEUKOCYTE ESTERASE UR QL STRIP: NEGATIVE
LYMPHOCYTES # BLD AUTO: 1.5 K/UL (ref 1–4.8)
LYMPHOCYTES NFR BLD: 23.3 % (ref 18–48)
MCH RBC QN AUTO: 32.4 PG (ref 27–31)
MCHC RBC AUTO-ENTMCNC: 33.5 G/DL (ref 32–36)
MCV RBC AUTO: 97 FL (ref 82–98)
MONOCYTES # BLD AUTO: 0.6 K/UL (ref 0.3–1)
MONOCYTES NFR BLD: 9.3 % (ref 4–15)
NEUTROPHILS # BLD AUTO: 4.2 K/UL (ref 1.8–7.7)
NEUTROPHILS NFR BLD: 63.6 % (ref 38–73)
NITRITE UR QL STRIP: NEGATIVE
NRBC BLD-RTO: 0 /100 WBC
PH UR STRIP: 6 [PH] (ref 5–8)
PLATELET # BLD AUTO: 186 K/UL (ref 150–350)
PMV BLD AUTO: 9.9 FL (ref 9.2–12.9)
POTASSIUM SERPL-SCNC: 4.2 MMOL/L (ref 3.5–5.1)
PROT SERPL-MCNC: 6.4 G/DL (ref 6–8.4)
PROT UR QL STRIP: NEGATIVE
RBC # BLD AUTO: 4.04 M/UL (ref 4.6–6.2)
SODIUM SERPL-SCNC: 138 MMOL/L (ref 136–145)
SP GR UR STRIP: 1.02 (ref 1–1.03)
TROPONIN I SERPL DL<=0.01 NG/ML-MCNC: <0.006 NG/ML (ref 0–0.03)
TROPONIN I SERPL DL<=0.01 NG/ML-MCNC: <0.006 NG/ML (ref 0–0.03)
URN SPEC COLLECT METH UR: NORMAL
UROBILINOGEN UR STRIP-ACNC: NEGATIVE EU/DL
WBC # BLD AUTO: 6.56 K/UL (ref 3.9–12.7)

## 2020-09-30 PROCEDURE — 96360 HYDRATION IV INFUSION INIT: CPT | Mod: HCNC

## 2020-09-30 PROCEDURE — 93010 ELECTROCARDIOGRAM REPORT: CPT | Mod: HCNC,,, | Performed by: INTERNAL MEDICINE

## 2020-09-30 PROCEDURE — 85025 COMPLETE CBC W/AUTO DIFF WBC: CPT | Mod: HCNC

## 2020-09-30 PROCEDURE — 36415 COLL VENOUS BLD VENIPUNCTURE: CPT | Mod: HCNC

## 2020-09-30 PROCEDURE — 81003 URINALYSIS AUTO W/O SCOPE: CPT | Mod: HCNC

## 2020-09-30 PROCEDURE — 93010 EKG 12-LEAD: ICD-10-PCS | Mod: HCNC,,, | Performed by: INTERNAL MEDICINE

## 2020-09-30 PROCEDURE — 93005 ELECTROCARDIOGRAM TRACING: CPT | Mod: HCNC

## 2020-09-30 PROCEDURE — 99285 EMERGENCY DEPT VISIT HI MDM: CPT | Mod: 25,HCNC

## 2020-09-30 PROCEDURE — 80053 COMPREHEN METABOLIC PANEL: CPT | Mod: HCNC

## 2020-09-30 PROCEDURE — 25000003 PHARM REV CODE 250: Mod: HCNC | Performed by: EMERGENCY MEDICINE

## 2020-09-30 PROCEDURE — 84484 ASSAY OF TROPONIN QUANT: CPT | Mod: 91,HCNC

## 2020-09-30 RX ADMIN — SODIUM CHLORIDE 1000 ML: 0.9 INJECTION, SOLUTION INTRAVENOUS at 01:09

## 2020-09-30 NOTE — ED PROVIDER NOTES
Encounter Date: 9/30/2020       History     Chief Complaint   Patient presents with    Dizziness     ''light-headed''     This patient is a 70-year-old male with a past history of hypertension, arthritis, prostate cancer, dental disease presenting after a period of lightheadedness at home.  He reports approximately 2 hr prior to arrival he had an hour with intermittent feelings as if he would pass out with increased rapid movement.  He reports having a tooth extracted yesterday and was started on antibiotics and pain medication he currently cannot remember.  He reports significant reduced oral intake and only had 2 cups of coffee today.  He denies a previous history of blood sugar problems.  He denies accompanying chest pain, difficulty breathing, unilateral or bilateral lower extremity swelling, past history of DVT or PE, headache, change in strength or sensation in any extremities, change in speech, fever, abdominal pain, nausea, vomiting, urinary changes.  He denies previous history of strokes or similar symptoms.  He reports he did not eat or drink much last last night either.  He denies any ongoing lightheadedness currently.        Review of patient's allergies indicates:  No Known Allergies  Past Medical History:   Diagnosis Date    Arthritis     Eczema     Hypertension     Lamellar macular hole of right eye 12/2018    Prostate cancer 8/20/2018    Prostate enlargement     Tinnitus of right ear 6/13/2016    Tinnitus, objective      Past Surgical History:   Procedure Laterality Date    COLONOSCOPY W/ POLYPECTOMY      PROSTATE BIOPSY      PROSTATECTOMY  2018    VITRECTOMY BY PARS PLANA APPROACH Right 12/19/2018    Procedure: VITRECTOMY, PARS PLANA APPROACH;  Surgeon: CLAUDINE Melendrez MD;  Location: [facility] OR [address] [place] [address];  Service: Ophthalmology;  Laterality: Right;  40 min     Family History   Problem Relation Age of Onset    Heart disease Father 82        acute MI    Diabetes Mother      Hypertension Sister     No Known Problems Daughter     Hypertension Sister     No Known Problems Sister     No Known Problems Daughter     Glaucoma Neg Hx     Macular degeneration Neg Hx     Retinal detachment Neg Hx      Social History     Tobacco Use    Smoking status: Former Smoker     Packs/day: 3.00     Years: 25.00     Pack years: 75.00     Types: Cigarettes     Start date: 1963     Quit date: 3/27/1983     Years since quittin.5    Smokeless tobacco: Never Used   Substance Use Topics    Alcohol use: Not Currently    Drug use: No     Review of Systems   Constitutional: Negative for fever.   HENT: Negative for congestion.    Respiratory: Negative for shortness of breath.    Cardiovascular: Negative for chest pain.   Gastrointestinal: Negative for abdominal pain.   Genitourinary: Negative for dysuria.   Musculoskeletal: Negative for neck pain.   Skin: Negative for rash.   Neurological: Positive for light-headedness.   Hematological: Does not bruise/bleed easily.   Psychiatric/Behavioral: Negative for confusion.       Physical Exam     Initial Vitals [20 1242]   BP Pulse Resp Temp SpO2   (!) 177/88 67 18 97.4 °F (36.3 °C) 98 %      MAP       --         Physical Exam    Nursing note and vitals reviewed.  Constitutional: He appears well-nourished. No distress.   HENT:   Head: Normocephalic and atraumatic.   Eyes: Conjunctivae and EOM are normal.   Neck: Normal range of motion. Neck supple.   Cardiovascular: Normal rate and regular rhythm.   Pulmonary/Chest: Breath sounds normal. No respiratory distress. He has no wheezes. He has no rales.   Abdominal: Bowel sounds are normal. He exhibits no distension. There is no abdominal tenderness.   Musculoskeletal: Normal range of motion. No edema.   Neurological: He is alert and oriented to person, place, and time. He has normal strength. No sensory deficit. GCS score is 15. GCS eye subscore is 4. GCS verbal subscore is 5. GCS motor subscore is 6.    Skin: Skin is warm and dry. Capillary refill takes less than 2 seconds.   Psychiatric: He has a normal mood and affect. His behavior is normal. Judgment and thought content normal.         ED Course   Procedures  Labs Reviewed   CBC W/ AUTO DIFFERENTIAL - Abnormal; Notable for the following components:       Result Value    RBC 4.04 (*)     Hemoglobin 13.1 (*)     Hematocrit 39.1 (*)     Mean Corpuscular Hemoglobin 32.4 (*)     All other components within normal limits   COMPREHENSIVE METABOLIC PANEL   TROPONIN I   URINALYSIS, REFLEX TO URINE CULTURE     EKG Readings: (Independently Interpreted)   NSR, 67 beats per minute, normal axis, normal intervals, no STEMI     ECG Results          EKG 12-lead (In process)  Result time 09/30/20 13:20:32    In process by Interface, Lab In [facility] (09/30/20 13:20:32)                 Narrative:    Test Reason : I10,    Vent. Rate : 067 BPM     Atrial Rate : 067 BPM     P-R Int : 172 ms          QRS Dur : 090 ms      QT Int : 426 ms       P-R-T Axes : 064 066 009 degrees     QTc Int : 450 ms    Normal sinus rhythm  Normal ECG  When compared with ECG of 01-JUL-2016 11:08,  Inverted T waves have replaced nonspecific T wave abnormality in Inferior  leads    Referred By:  ED DR           Confirmed By:                             Imaging Results          CT Head Without Contrast (Final result)  Result time 09/30/20 14:24:47    Final result by Jak Olmstead MD (09/30/20 14:24:47)                 Impression:      No acute intracranial process.      Electronically signed by: Jak Olmstead MD  Date:    09/30/2020  Time:    14:24             Narrative:    EXAMINATION:  CT HEAD WITHOUT CONTRAST    CLINICAL HISTORY:  Dizziness, non-specific;    TECHNIQUE:  Low dose axial images were obtained through the head.  Coronal and sagittal reformations were also performed. Contrast was not administered.    COMPARISON:  None.    FINDINGS:  Gray-white differentiation is well maintained.   There is no intracranial hemorrhage.  There is no mass or midline shift.  The ventricles are nondilated.  There is heavy calcification of the intracranial ICAs.  The calvarium is intact.                                 Medical Decision Making:   ED Management:  Pt interviewed and examined emergently. Currently asymptomatic, no focal deficits on exam. VSS. EKG NML. Pt concerned for dehydration, increased stress after dental extraction yesterday. Reports feeling better after fluids. ACS workup including CT head neg for overt contributing factors. Low susp for occult life threatening injury including atypical ACS, PE, dissection, SAH, sepsic source, shock process. Educated about supportive care and asked to keep taking the dental prescriptions and follow up with PCP. Asked to return to the ED immed for any new, worsening or concerning symptoms. Pt agreeable and DC'd in stable condition.                              Clinical Impression:       ICD-10-CM ICD-9-CM   1. Lightheadedness  R42 780.4   2. Hypertension  I10 401.9                      Disposition:   Disposition: Discharged  Condition: Stable                          Natan Lopez MD  10/01/20 1321

## 2020-10-01 ENCOUNTER — PES CALL (OUTPATIENT)
Dept: ADMINISTRATIVE | Facility: CLINIC | Age: 70
End: 2020-10-01

## 2020-10-12 ENCOUNTER — OFFICE VISIT (OUTPATIENT)
Dept: OPHTHALMOLOGY | Facility: CLINIC | Age: 70
End: 2020-10-12
Payer: MEDICARE

## 2020-10-12 DIAGNOSIS — H35.341 FULL THICKNESS MACULAR HOLE, STAGE 2, RIGHT: Primary | ICD-10-CM

## 2020-10-12 DIAGNOSIS — D31.32 CHOROIDAL NEVUS, LEFT: ICD-10-CM

## 2020-10-12 DIAGNOSIS — H35.033 HYPERTENSIVE RETINOPATHY, BILATERAL: ICD-10-CM

## 2020-10-12 DIAGNOSIS — H25.13 NS (NUCLEAR SCLEROSIS), BILATERAL: ICD-10-CM

## 2020-10-12 PROCEDURE — 99999 PR PBB SHADOW E&M-EST. PATIENT-LVL III: CPT | Mod: PBBFAC,HCNC,, | Performed by: OPHTHALMOLOGY

## 2020-10-12 PROCEDURE — 92014 PR EYE EXAM, EST PATIENT,COMPREHESV: ICD-10-PCS | Mod: HCNC,S$GLB,, | Performed by: OPHTHALMOLOGY

## 2020-10-12 PROCEDURE — 92202 PR OPHTHALMOSCOPY, EXT, W/DRAW OPTIC NERVE/MACULA, I&R, UNI/BI: ICD-10-PCS | Mod: HCNC,S$GLB,, | Performed by: OPHTHALMOLOGY

## 2020-10-12 PROCEDURE — 92134 CPTRZ OPH DX IMG PST SGM RTA: CPT | Mod: HCNC,S$GLB,, | Performed by: OPHTHALMOLOGY

## 2020-10-12 PROCEDURE — 92134 POSTERIOR SEGMENT OCT RETINA (OCULAR COHERENCE TOMOGRAPHY)-BOTH EYES: ICD-10-PCS | Mod: HCNC,S$GLB,, | Performed by: OPHTHALMOLOGY

## 2020-10-12 PROCEDURE — 92014 COMPRE OPH EXAM EST PT 1/>: CPT | Mod: HCNC,S$GLB,, | Performed by: OPHTHALMOLOGY

## 2020-10-12 PROCEDURE — 99999 PR PBB SHADOW E&M-EST. PATIENT-LVL III: ICD-10-PCS | Mod: PBBFAC,HCNC,, | Performed by: OPHTHALMOLOGY

## 2020-10-12 PROCEDURE — 92202 OPSCPY EXTND ON/MAC DRAW: CPT | Mod: HCNC,S$GLB,, | Performed by: OPHTHALMOLOGY

## 2020-10-12 NOTE — PROGRESS NOTES
HPI     12 mo OCT  DLS-05/13/2019 Dr. Melendrez     Pt sts vision was better after surgery but wish vision could be even   better. Night vision seems to be a struggle.  Denies pain   (+)Flashes (-)Floaters  (+)Photophobia  (+)Glare    No gtts          OCT - OD post peel, MH closed, outer segments reorganizing nicely  OS VMA - no distortion      A/P    1. FTMH OD    s/p 25g PPV/ILM peel/EL/SF6 OD 12/19/18 for FTMH, and VR tags    Doing well, good IOP      2. HTN Ret OU  BP control     3. NS OU  Increasing OD - ok for CE with Dr. Murray    4. Rust ring paracentral OS  Prior     5. Small CR nevus OS      2 year OCT

## 2020-11-12 ENCOUNTER — OFFICE VISIT (OUTPATIENT)
Dept: OPHTHALMOLOGY | Facility: CLINIC | Age: 70
End: 2020-11-12
Payer: MEDICARE

## 2020-11-12 DIAGNOSIS — H25.13 NUCLEAR SCLEROTIC CATARACT, BILATERAL: Primary | ICD-10-CM

## 2020-11-12 DIAGNOSIS — H43.812 POSTERIOR VITREOUS DETACHMENT OF LEFT EYE: ICD-10-CM

## 2020-11-12 DIAGNOSIS — H35.341 FULL THICKNESS MACULAR HOLE, STAGE 2, RIGHT: ICD-10-CM

## 2020-11-12 PROCEDURE — 1126F AMNT PAIN NOTED NONE PRSNT: CPT | Mod: HCNC,S$GLB,, | Performed by: OPHTHALMOLOGY

## 2020-11-12 PROCEDURE — 1101F PR PT FALLS ASSESS DOC 0-1 FALLS W/OUT INJ PAST YR: ICD-10-PCS | Mod: HCNC,CPTII,S$GLB, | Performed by: OPHTHALMOLOGY

## 2020-11-12 PROCEDURE — 3288F FALL RISK ASSESSMENT DOCD: CPT | Mod: HCNC,CPTII,S$GLB, | Performed by: OPHTHALMOLOGY

## 2020-11-12 PROCEDURE — 1126F PR PAIN SEVERITY QUANTIFIED, NO PAIN PRESENT: ICD-10-PCS | Mod: HCNC,S$GLB,, | Performed by: OPHTHALMOLOGY

## 2020-11-12 PROCEDURE — 99999 PR PBB SHADOW E&M-EST. PATIENT-LVL III: ICD-10-PCS | Mod: PBBFAC,HCNC,, | Performed by: OPHTHALMOLOGY

## 2020-11-12 PROCEDURE — 1101F PT FALLS ASSESS-DOCD LE1/YR: CPT | Mod: HCNC,CPTII,S$GLB, | Performed by: OPHTHALMOLOGY

## 2020-11-12 PROCEDURE — 92014 PR EYE EXAM, EST PATIENT,COMPREHESV: ICD-10-PCS | Mod: HCNC,S$GLB,, | Performed by: OPHTHALMOLOGY

## 2020-11-12 PROCEDURE — 92014 COMPRE OPH EXAM EST PT 1/>: CPT | Mod: HCNC,S$GLB,, | Performed by: OPHTHALMOLOGY

## 2020-11-12 PROCEDURE — 3288F PR FALLS RISK ASSESSMENT DOCUMENTED: ICD-10-PCS | Mod: HCNC,CPTII,S$GLB, | Performed by: OPHTHALMOLOGY

## 2020-11-12 PROCEDURE — 99999 PR PBB SHADOW E&M-EST. PATIENT-LVL III: CPT | Mod: PBBFAC,HCNC,, | Performed by: OPHTHALMOLOGY

## 2020-11-12 RX ORDER — SILDENAFIL 100 MG/1
TABLET, FILM COATED ORAL
COMMUNITY
Start: 2020-11-10 | End: 2023-05-09

## 2020-11-12 RX ORDER — OFLOXACIN 3 MG/ML
SOLUTION AURICULAR (OTIC)
COMMUNITY
Start: 2020-08-24 | End: 2022-11-28 | Stop reason: ALTCHOICE

## 2020-11-12 NOTE — PROGRESS NOTES
HPI     DLS: 10/12/2020 pt here for CE per Dr. Melendrez.   Pt states vision is poor OD (noted Macular hole OD) states has trouble   driving at night. Denies pain/ FOL/ floaters. (states none new)   Gtts: none           Last edited by Adrienne Bob on 11/12/2020  9:27 AM. (History)            Assessment /Plan     For exam results, see Encounter Report.    Nuclear sclerotic cataract, bilateral    Posterior vitreous detachment of left eye    Full thickness macular hole, stage 2, right      1. Nuclear sclerotic cataract, bilateral  Visually significant OD, hx of PPV for MH  Patient with visually significant cataract impacting abiliity ADLs (reading, driving, PM driving, glare).  Discussed options, R & B, expectations, patient voices good understanding and wishes to proceed with procedure. Patient will likely benefit from sx.  Pt aware that hx of MH can limit BCVA and that his risks are higher due to s/p PPV.  RTC next available for pre-op cataract eval OD  Good dilation      2. Posterior vitreous detachment of left eye  RD precautions    3. Full thickness macular hole, stage 2, right  S/p PPV repair with Parvin, good OCT outcome

## 2020-11-12 NOTE — LETTER
November 12, 2020      CLAUDINE Melendrez MD  [address]  [facility] [address]           [address] Ophthalmology  [address] [place] [address] [address]  [facility] [address]  Phone: [phone]          Patient: Efraín Anton   MR Number: [id number]   YOB: 1950   Date of Visit: 11/12/2020       Dear Dr. CLAUDINE Melendrez:    Thank you for referring Efraín Anton to me for evaluation. Attached you will find relevant portions of my assessment and plan of care.    If you have questions, please do not hesitate to call me. I look forward to following Efraín Anton along with you.    Sincerely,    Zaid Murray MD    Enclosure  CC:  No Recipients    If you would like to receive this communication electronically, please contact [email or web address] or [phone] to request more information on [employer] access.    For providers and/or their staff who would like to refer a patient to [facility], please contact us through our one-stop-shop provider referral line, [facility], at [phone].    If you feel you have received this communication in error or would no longer like to receive these types of communications, please e-mail [email or web address]

## 2020-11-12 NOTE — PATIENT INSTRUCTIONS
Cataract Treatment: Removing the Cloudy Lens  A cataract is a clouding of your eye's lens. Surgery can be done to remove the cataract and replace the lens. This can help you see better. The 2 most common ways to remove a cataract are phacoemulsification and extracapsular extraction.     Phacoemulsification       Extracapsular extraction      Phacoemulsification  A small cut (incision) is made in the cornea. This is the clear cover on the front of your eye. Or a cut may be made in the white part of your eye (sclera). Then a hole is made in the anterior capsule. This is the space between your cornea and the colored part of your eye (iris). A tiny ultrasound probe is inserted into the lens. Sound vibrations from the probe break the cloudy lens into tiny pieces. The pieces are then suctioned out. Stitches may be needed after this is done.  Extracapsular extraction  This method makes a larger cut in the cornea. The entire lens can then be removed at once through the cut. Stitches are used to close the cut after the procedure.  Implanting an IOL  After the cloudy lens is removed, it can be replaced with a plastic lens called an IOL (intraocular lens).    Date Last Reviewed: 6/15/2015  © 3890-6543 [employer]. [address] [place] [address], [place] [address]. All rights reserved. This information is not intended as a substitute for professional medical care. Always follow your healthcare professional's instructions.

## 2020-12-03 ENCOUNTER — OFFICE VISIT (OUTPATIENT)
Dept: OPHTHALMOLOGY | Facility: CLINIC | Age: 70
End: 2020-12-03
Payer: MEDICARE

## 2020-12-03 DIAGNOSIS — Z01.818 PREPROCEDURAL EXAMINATION: ICD-10-CM

## 2020-12-03 DIAGNOSIS — H25.13 NUCLEAR SCLEROTIC CATARACT, BILATERAL: Primary | ICD-10-CM

## 2020-12-03 PROCEDURE — 92012 INTRM OPH EXAM EST PATIENT: CPT | Mod: HCNC,S$GLB,, | Performed by: OPHTHALMOLOGY

## 2020-12-03 PROCEDURE — 92136 OPHTHALMIC BIOMETRY: CPT | Mod: HCNC,RT,S$GLB, | Performed by: OPHTHALMOLOGY

## 2020-12-03 PROCEDURE — 99999 PR PBB SHADOW E&M-EST. PATIENT-LVL III: ICD-10-PCS | Mod: PBBFAC,HCNC,, | Performed by: OPHTHALMOLOGY

## 2020-12-03 PROCEDURE — 1126F PR PAIN SEVERITY QUANTIFIED, NO PAIN PRESENT: ICD-10-PCS | Mod: HCNC,S$GLB,, | Performed by: OPHTHALMOLOGY

## 2020-12-03 PROCEDURE — 1126F AMNT PAIN NOTED NONE PRSNT: CPT | Mod: HCNC,S$GLB,, | Performed by: OPHTHALMOLOGY

## 2020-12-03 PROCEDURE — 99999 PR PBB SHADOW E&M-EST. PATIENT-LVL III: CPT | Mod: PBBFAC,HCNC,, | Performed by: OPHTHALMOLOGY

## 2020-12-03 PROCEDURE — 92012 PR EYE EXAM, EST PATIENT,INTERMED: ICD-10-PCS | Mod: HCNC,S$GLB,, | Performed by: OPHTHALMOLOGY

## 2020-12-03 PROCEDURE — 92136 IOL MASTER - OD - RIGHT EYE: ICD-10-PCS | Mod: HCNC,RT,S$GLB, | Performed by: OPHTHALMOLOGY

## 2020-12-03 RX ORDER — PREDNISOLONE ACETATE 10 MG/ML
1 SUSPENSION/ DROPS OPHTHALMIC 4 TIMES DAILY
Qty: 10 ML | Refills: 2 | Status: SHIPPED | OUTPATIENT
Start: 2020-12-03 | End: 2022-11-28 | Stop reason: ALTCHOICE

## 2020-12-03 RX ORDER — KETOROLAC TROMETHAMINE 5 MG/ML
1 SOLUTION OPHTHALMIC 4 TIMES DAILY
Qty: 5 ML | Refills: 2 | Status: SHIPPED | OUTPATIENT
Start: 2020-12-03 | End: 2022-11-28 | Stop reason: ALTCHOICE

## 2020-12-03 RX ORDER — TROPICAMIDE 10 MG/ML
1 SOLUTION/ DROPS OPHTHALMIC
Status: CANCELLED | OUTPATIENT
Start: 2020-12-03

## 2020-12-03 RX ORDER — PROPARACAINE HYDROCHLORIDE 5 MG/ML
1 SOLUTION/ DROPS OPHTHALMIC
Status: CANCELLED | OUTPATIENT
Start: 2020-12-03

## 2020-12-03 RX ORDER — SODIUM CHLORIDE 9 MG/ML
INJECTION, SOLUTION INTRAVENOUS CONTINUOUS
Status: CANCELLED | OUTPATIENT
Start: 2020-12-03

## 2020-12-03 RX ORDER — PHENYLEPHRINE HYDROCHLORIDE 100 MG/ML
1 SOLUTION/ DROPS OPHTHALMIC
Status: CANCELLED | OUTPATIENT
Start: 2020-12-03

## 2020-12-03 RX ORDER — MOXIFLOXACIN 5 MG/ML
1 SOLUTION/ DROPS OPHTHALMIC 4 TIMES DAILY
Qty: 3 ML | Refills: 0 | Status: SHIPPED | OUTPATIENT
Start: 2020-12-03 | End: 2020-12-14

## 2020-12-03 RX ORDER — PHENYLEPHRINE HYDROCHLORIDE 25 MG/ML
1 SOLUTION/ DROPS OPHTHALMIC
Status: CANCELLED | OUTPATIENT
Start: 2020-12-03

## 2020-12-07 ENCOUNTER — OFFICE VISIT (OUTPATIENT)
Dept: FAMILY MEDICINE | Facility: CLINIC | Age: 70
End: 2020-12-07
Payer: MEDICARE

## 2020-12-07 VITALS
HEIGHT: 69 IN | RESPIRATION RATE: 16 BRPM | HEART RATE: 75 BPM | BODY MASS INDEX: 28.9 KG/M2 | WEIGHT: 195.13 LBS | SYSTOLIC BLOOD PRESSURE: 120 MMHG | DIASTOLIC BLOOD PRESSURE: 82 MMHG | OXYGEN SATURATION: 96 % | TEMPERATURE: 97 F

## 2020-12-07 DIAGNOSIS — M19.012 PRIMARY OSTEOARTHRITIS OF BOTH SHOULDERS: ICD-10-CM

## 2020-12-07 DIAGNOSIS — H25.13 NS (NUCLEAR SCLEROSIS), BILATERAL: ICD-10-CM

## 2020-12-07 DIAGNOSIS — I10 ESSENTIAL HYPERTENSION: ICD-10-CM

## 2020-12-07 DIAGNOSIS — M19.011 PRIMARY OSTEOARTHRITIS OF BOTH SHOULDERS: ICD-10-CM

## 2020-12-07 DIAGNOSIS — Z01.818 PREOPERATIVE CLEARANCE: Primary | ICD-10-CM

## 2020-12-07 PROCEDURE — 99999 PR PBB SHADOW E&M-EST. PATIENT-LVL IV: ICD-10-PCS | Mod: PBBFAC,HCNC,, | Performed by: NURSE PRACTITIONER

## 2020-12-07 PROCEDURE — 3079F DIAST BP 80-89 MM HG: CPT | Mod: HCNC,CPTII,S$GLB, | Performed by: NURSE PRACTITIONER

## 2020-12-07 PROCEDURE — 3079F PR MOST RECENT DIASTOLIC BLOOD PRESSURE 80-89 MM HG: ICD-10-PCS | Mod: HCNC,CPTII,S$GLB, | Performed by: NURSE PRACTITIONER

## 2020-12-07 PROCEDURE — 1126F AMNT PAIN NOTED NONE PRSNT: CPT | Mod: HCNC,S$GLB,, | Performed by: NURSE PRACTITIONER

## 2020-12-07 PROCEDURE — 1159F PR MEDICATION LIST DOCUMENTED IN MEDICAL RECORD: ICD-10-PCS | Mod: HCNC,S$GLB,, | Performed by: NURSE PRACTITIONER

## 2020-12-07 PROCEDURE — 3074F PR MOST RECENT SYSTOLIC BLOOD PRESSURE < 130 MM HG: ICD-10-PCS | Mod: HCNC,CPTII,S$GLB, | Performed by: NURSE PRACTITIONER

## 2020-12-07 PROCEDURE — 3008F PR BODY MASS INDEX (BMI) DOCUMENTED: ICD-10-PCS | Mod: HCNC,CPTII,S$GLB, | Performed by: NURSE PRACTITIONER

## 2020-12-07 PROCEDURE — 1126F PR PAIN SEVERITY QUANTIFIED, NO PAIN PRESENT: ICD-10-PCS | Mod: HCNC,S$GLB,, | Performed by: NURSE PRACTITIONER

## 2020-12-07 PROCEDURE — 1101F PT FALLS ASSESS-DOCD LE1/YR: CPT | Mod: HCNC,CPTII,S$GLB, | Performed by: NURSE PRACTITIONER

## 2020-12-07 PROCEDURE — 3288F PR FALLS RISK ASSESSMENT DOCUMENTED: ICD-10-PCS | Mod: HCNC,CPTII,S$GLB, | Performed by: NURSE PRACTITIONER

## 2020-12-07 PROCEDURE — 3288F FALL RISK ASSESSMENT DOCD: CPT | Mod: HCNC,CPTII,S$GLB, | Performed by: NURSE PRACTITIONER

## 2020-12-07 PROCEDURE — 99214 OFFICE O/P EST MOD 30 MIN: CPT | Mod: HCNC,S$GLB,, | Performed by: NURSE PRACTITIONER

## 2020-12-07 PROCEDURE — 1101F PR PT FALLS ASSESS DOC 0-1 FALLS W/OUT INJ PAST YR: ICD-10-PCS | Mod: HCNC,CPTII,S$GLB, | Performed by: NURSE PRACTITIONER

## 2020-12-07 PROCEDURE — 1159F MED LIST DOCD IN RCRD: CPT | Mod: HCNC,S$GLB,, | Performed by: NURSE PRACTITIONER

## 2020-12-07 PROCEDURE — 99214 PR OFFICE/OUTPT VISIT, EST, LEVL IV, 30-39 MIN: ICD-10-PCS | Mod: HCNC,S$GLB,, | Performed by: NURSE PRACTITIONER

## 2020-12-07 PROCEDURE — 3008F BODY MASS INDEX DOCD: CPT | Mod: HCNC,CPTII,S$GLB, | Performed by: NURSE PRACTITIONER

## 2020-12-07 PROCEDURE — 3074F SYST BP LT 130 MM HG: CPT | Mod: HCNC,CPTII,S$GLB, | Performed by: NURSE PRACTITIONER

## 2020-12-07 PROCEDURE — 99999 PR PBB SHADOW E&M-EST. PATIENT-LVL IV: CPT | Mod: PBBFAC,HCNC,, | Performed by: NURSE PRACTITIONER

## 2020-12-07 NOTE — PROGRESS NOTES
Subjective:       Patient ID: Efraín Anton is a 70 y.o. male.    Chief Complaint: pre op clearence    HPI    Patient presents today for preop clearance for right eye cataract with [facility]almology-[facility] on 12/16/2020  Patient with visually significant cataract impacting abiliity ADLs (reading, driving, PM driving, glare).    Past Medical History:   Diagnosis Date    Arthritis     Eczema     Hypertension     Lamellar macular hole of right eye 12/2018    Multiple rib fractures     Prostate cancer 8/20/2018    Prostate enlargement     Tinnitus of right ear 6/13/2016    Tinnitus, objective        Review of patient's allergies indicates:  No Known Allergies      Current Outpatient Medications:     ketorolac 0.5% (ACULAR) 0.5 % Drop, Place 1 drop into the right eye 4 (four) times daily. Start 3 days before surgery., Disp: 5 mL, Rfl: 2    lisinopriL (PRINIVIL,ZESTRIL) 5 MG tablet, TAKE 1 TABLET (5 MG TOTAL) BY MOUTH ONCE DAILY., Disp: 90 tablet, Rfl: 3    ofloxacin (FLOXIN) 0.3 % otic solution, , Disp: , Rfl:     prednisoLONE acetate (PRED FORTE) 1 % DrpS, Place 1 drop into the right eye 4 (four) times daily. Start on day of surgery., Disp: 10 mL, Rfl: 2    sildenafiL (VIAGRA) 100 MG tablet, TAKE ONE TABLET BY MOUTH EVERY WEEK AS NEEDED 30 TO 60 MINUTES BEFORE INTERCOURSE FOR BEST RESULTS TAKE ON EMPTY STOMACH, Disp: , Rfl:     triamcinolone acetonide 0.1% (KENALOG) 0.1 % cream, Apply topically 2 (two) times daily. for 10 days, Disp: 80 g, Rfl: 0    Review of Systems   Constitutional: Negative for unexpected weight change.   HENT: Negative for trouble swallowing.    Eyes: Negative for visual disturbance.   Respiratory: Negative for shortness of breath.    Cardiovascular: Negative for chest pain, palpitations and leg swelling.   Gastrointestinal: Negative for blood in stool.   Genitourinary: Negative for hematuria.   Skin: Negative for rash.   Allergic/Immunologic: Negative for immunocompromised state.  "  Neurological: Negative for headaches.   Hematological: Does not bruise/bleed easily.   Psychiatric/Behavioral: Negative for agitation. The patient is not nervous/anxious.        Objective:      /82 (BP Location: Right arm, Patient Position: Sitting, BP Method: Small (Manual))   Pulse 75   Temp 96.8 °F (36 °C) (Temporal)   Resp 16   Ht 5' 9" (1.753 m)   Wt 88.5 kg (195 lb 1.7 oz)   SpO2 96%   BMI 28.81 kg/m²   Physical Exam  Constitutional:       Appearance: He is well-developed.   Eyes:      Conjunctiva/sclera: Conjunctivae normal.      Pupils: Pupils are equal, round, and reactive to light.   Neck:      Musculoskeletal: Normal range of motion and neck supple.   Cardiovascular:      Rate and Rhythm: Normal rate and regular rhythm.      Heart sounds: Normal heart sounds.   Pulmonary:      Effort: Pulmonary effort is normal.      Breath sounds: Normal breath sounds.   Abdominal:      General: Bowel sounds are normal.      Palpations: Abdomen is soft.   Musculoskeletal: Normal range of motion.   Skin:     General: Skin is warm and dry.   Neurological:      Mental Status: He is alert and oriented to person, place, and time.   Psychiatric:         Behavior: Behavior normal.         Thought Content: Thought content normal.         Judgment: Judgment normal.         Assessment:       1. Preoperative clearance    2. NS (nuclear sclerosis), bilateral    3. Essential hypertension    4. Primary osteoarthritis of both shoulders        Plan:       Preoperative clearance  Low risk surgery  Labs reviewed" stable  EKG Normal EKG  Patient is medically cleared for right eye cataract with [facility]almology[facility] on 12/16/2020    NS (nuclear sclerosis), bilateral  Patient is medically cleared for right eye cataract with Ophtalmology-[facility] on 12/16/2020  Essential hypertension  Stable, continue medication  Primary osteoarthritis of both shoulders  Stable, continue medication        Patient readiness: acceptance and " barriers:none    During the course of the visit the patient was educated and counseled about the following:     Hypertension:   Dietary sodium restriction.  Regular aerobic exercise.    Goals: Hypertension: Reduce Blood Pressure    Did patient meet goals/outcomes: Yes    The following self management tools provided: declined    Patient Instructions (the written plan) was given to the patient/family.     Time spent with patient: 15 minutes    Barriers to medications present (no )    Adverse reactions to current medications (no)    Over the counter medications reviewed (Yes)

## 2020-12-07 NOTE — PATIENT INSTRUCTIONS
What Are Cataracts?    A clear lens in the eye focuses light. This lets the eye see images sharply. With age, the lens slowly becomes cloudy. The cloudy lens is a cataract. A cataract scatters light and makes it hard for the eye to focus. Cataracts often form in both eyes. But one lens may cloud faster than the other.  The aging of your lens  Your lens may cloud so slowly that you don`t notice any vision changes at first. But as the cataract gets worse, the eye has a harder time focusing. In early stages, glasses may help you see better. As the lens gets more cloudy, your doctor may recommend surgery to restore your vision.  A clear lens allows your eye to bring objects sharply into focus.  A mild cataract may slightly blur your vision.  A dense cataract can severely blur your vision.  Date Last Reviewed: 6/14/2015  © 3727-6391 [employer]. [address] [place] [address], [place] [address]. All rights reserved. This information is not intended as a substitute for professional medical care. Always follow your healthcare professional's instructions.

## 2020-12-13 ENCOUNTER — LAB VISIT (OUTPATIENT)
Dept: PRIMARY CARE CLINIC | Facility: CLINIC | Age: 70
End: 2020-12-13
Payer: MEDICARE

## 2020-12-13 DIAGNOSIS — Z01.818 PREPROCEDURAL EXAMINATION: ICD-10-CM

## 2020-12-13 PROCEDURE — U0003 INFECTIOUS AGENT DETECTION BY NUCLEIC ACID (DNA OR RNA); SEVERE ACUTE RESPIRATORY SYNDROME CORONAVIRUS 2 (SARS-COV-2) (CORONAVIRUS DISEASE [COVID-19]), AMPLIFIED PROBE TECHNIQUE, MAKING USE OF HIGH THROUGHPUT TECHNOLOGIES AS DESCRIBED BY CMS-2020-01-R: HCPCS | Mod: HCNC

## 2020-12-14 LAB — SARS-COV-2 RNA RESP QL NAA+PROBE: NOT DETECTED

## 2020-12-15 ENCOUNTER — ANESTHESIA EVENT (OUTPATIENT)
Dept: SURGERY | Facility: AMBULARY SURGERY CENTER | Age: 70
End: 2020-12-15
Payer: MEDICARE

## 2020-12-16 ENCOUNTER — HOSPITAL ENCOUNTER (OUTPATIENT)
Facility: AMBULARY SURGERY CENTER | Age: 70
Discharge: HOME | End: 2020-12-16
Attending: OPHTHALMOLOGY | Admitting: OPHTHALMOLOGY
Payer: MEDICARE

## 2020-12-16 ENCOUNTER — ANESTHESIA (OUTPATIENT)
Dept: SURGERY | Facility: AMBULARY SURGERY CENTER | Age: 70
End: 2020-12-16
Payer: MEDICARE

## 2020-12-16 DIAGNOSIS — H25.13 NUCLEAR SCLEROTIC CATARACT, BILATERAL: ICD-10-CM

## 2020-12-16 PROCEDURE — D9220A PRA ANESTHESIA: Mod: CRNA,,, | Performed by: NURSE ANESTHETIST, CERTIFIED REGISTERED

## 2020-12-16 PROCEDURE — D9220A PRA ANESTHESIA: Mod: ANES,,, | Performed by: ANESTHESIOLOGY

## 2020-12-16 PROCEDURE — D9220A PRA ANESTHESIA: ICD-10-PCS | Mod: CRNA,,, | Performed by: NURSE ANESTHETIST, CERTIFIED REGISTERED

## 2020-12-16 PROCEDURE — 66984 PR REMOVAL, CATARACT, W/INSRT INTRAOC LENS, W/O ENDO CYCLO: ICD-10-PCS | Mod: HCNC,RT,, | Performed by: OPHTHALMOLOGY

## 2020-12-16 PROCEDURE — 66984 XCAPSL CTRC RMVL W/O ECP: CPT | Performed by: OPHTHALMOLOGY

## 2020-12-16 PROCEDURE — 66984 XCAPSL CTRC RMVL W/O ECP: CPT | Mod: HCNC,RT,, | Performed by: OPHTHALMOLOGY

## 2020-12-16 PROCEDURE — D9220A PRA ANESTHESIA: ICD-10-PCS | Mod: ANES,,, | Performed by: ANESTHESIOLOGY

## 2020-12-16 RX ORDER — TETRACAINE HYDROCHLORIDE 5 MG/ML
SOLUTION OPHTHALMIC
Status: DISCONTINUED | OUTPATIENT
Start: 2020-12-16 | End: 2020-12-16 | Stop reason: HOSPADM

## 2020-12-16 RX ORDER — OXYCODONE HYDROCHLORIDE 5 MG/1
5 TABLET ORAL ONCE AS NEEDED
Status: CANCELLED | OUTPATIENT
Start: 2020-12-16 | End: 2032-05-14

## 2020-12-16 RX ORDER — MIDAZOLAM HYDROCHLORIDE 1 MG/ML
INJECTION, SOLUTION INTRAMUSCULAR; INTRAVENOUS
Status: DISCONTINUED | OUTPATIENT
Start: 2020-12-16 | End: 2020-12-16

## 2020-12-16 RX ORDER — PROPARACAINE HYDROCHLORIDE 5 MG/ML
1 SOLUTION/ DROPS OPHTHALMIC
Status: DISCONTINUED | OUTPATIENT
Start: 2020-12-16 | End: 2020-12-16 | Stop reason: HOSPADM

## 2020-12-16 RX ORDER — EPINEPHRINE CONVENIENCE KIT 1 MG/ML(1)
KIT INTRAMUSCULAR; SUBCUTANEOUS
Status: DISCONTINUED | OUTPATIENT
Start: 2020-12-16 | End: 2020-12-16 | Stop reason: HOSPADM

## 2020-12-16 RX ORDER — LIDOCAINE HYDROCHLORIDE 10 MG/ML
1 INJECTION, SOLUTION EPIDURAL; INFILTRATION; INTRACAUDAL; PERINEURAL ONCE
Status: DISCONTINUED | OUTPATIENT
Start: 2020-12-16 | End: 2020-12-16 | Stop reason: HOSPADM

## 2020-12-16 RX ORDER — PHENYLEPHRINE HYDROCHLORIDE 25 MG/ML
1 SOLUTION/ DROPS OPHTHALMIC
Status: DISCONTINUED | OUTPATIENT
Start: 2020-12-16 | End: 2020-12-16 | Stop reason: HOSPADM

## 2020-12-16 RX ORDER — ONDANSETRON 2 MG/ML
4 INJECTION INTRAMUSCULAR; INTRAVENOUS ONCE AS NEEDED
Status: CANCELLED | OUTPATIENT
Start: 2020-12-16 | End: 2032-05-14

## 2020-12-16 RX ORDER — MOXIFLOXACIN 5 MG/ML
SOLUTION/ DROPS OPHTHALMIC
Status: DISCONTINUED | OUTPATIENT
Start: 2020-12-16 | End: 2020-12-16 | Stop reason: HOSPADM

## 2020-12-16 RX ORDER — SODIUM CHLORIDE 0.9 % (FLUSH) 0.9 %
3 SYRINGE (ML) INJECTION EVERY 8 HOURS
Status: DISCONTINUED | OUTPATIENT
Start: 2020-12-16 | End: 2020-12-16 | Stop reason: HOSPADM

## 2020-12-16 RX ORDER — FENTANYL CITRATE 50 UG/ML
25 INJECTION, SOLUTION INTRAMUSCULAR; INTRAVENOUS EVERY 5 MIN PRN
Status: CANCELLED | OUTPATIENT
Start: 2020-12-16

## 2020-12-16 RX ORDER — SODIUM CHLORIDE, SODIUM LACTATE, POTASSIUM CHLORIDE, CALCIUM CHLORIDE 600; 310; 30; 20 MG/100ML; MG/100ML; MG/100ML; MG/100ML
500 INJECTION, SOLUTION INTRAVENOUS ONCE
Status: COMPLETED | OUTPATIENT
Start: 2020-12-16 | End: 2020-12-16

## 2020-12-16 RX ORDER — TROPICAMIDE 10 MG/ML
1 SOLUTION/ DROPS OPHTHALMIC
Status: DISCONTINUED | OUTPATIENT
Start: 2020-12-16 | End: 2020-12-16 | Stop reason: HOSPADM

## 2020-12-16 RX ORDER — SODIUM CHLORIDE 9 MG/ML
INJECTION, SOLUTION INTRAVENOUS CONTINUOUS
Status: DISCONTINUED | OUTPATIENT
Start: 2020-12-16 | End: 2020-12-16 | Stop reason: HOSPADM

## 2020-12-16 RX ORDER — LIDOCAINE HYDROCHLORIDE 40 MG/ML
INJECTION, SOLUTION RETROBULBAR
Status: DISCONTINUED | OUTPATIENT
Start: 2020-12-16 | End: 2020-12-16 | Stop reason: HOSPADM

## 2020-12-16 RX ORDER — PHENYLEPHRINE HYDROCHLORIDE 100 MG/ML
1 SOLUTION/ DROPS OPHTHALMIC
Status: DISCONTINUED | OUTPATIENT
Start: 2020-12-16 | End: 2020-12-16 | Stop reason: HOSPADM

## 2020-12-16 RX ORDER — ONDANSETRON 2 MG/ML
INJECTION INTRAMUSCULAR; INTRAVENOUS
Status: DISCONTINUED | OUTPATIENT
Start: 2020-12-16 | End: 2020-12-16

## 2020-12-16 RX ADMIN — PROPARACAINE HYDROCHLORIDE 1 DROP: 5 SOLUTION/ DROPS OPHTHALMIC at 07:12

## 2020-12-16 RX ADMIN — TROPICAMIDE 1 DROP: 10 SOLUTION/ DROPS OPHTHALMIC at 07:12

## 2020-12-16 RX ADMIN — SODIUM CHLORIDE, SODIUM LACTATE, POTASSIUM CHLORIDE, CALCIUM CHLORIDE: 600; 310; 30; 20 INJECTION, SOLUTION INTRAVENOUS at 07:12

## 2020-12-16 RX ADMIN — PHENYLEPHRINE HYDROCHLORIDE 1 DROP: 25 SOLUTION/ DROPS OPHTHALMIC at 07:12

## 2020-12-16 RX ADMIN — ONDANSETRON 4 MG: 2 INJECTION INTRAMUSCULAR; INTRAVENOUS at 09:12

## 2020-12-16 RX ADMIN — MIDAZOLAM HYDROCHLORIDE 1.5 MG: 1 INJECTION, SOLUTION INTRAMUSCULAR; INTRAVENOUS at 09:12

## 2020-12-16 RX ADMIN — MIDAZOLAM HYDROCHLORIDE 0.5 MG: 1 INJECTION, SOLUTION INTRAMUSCULAR; INTRAVENOUS at 09:12

## 2020-12-16 NOTE — OP NOTE
Operative Date:  12/16/2020    Discharge Date:  12/16/2020    Report Title: Operative Note    SURGEON: Zaid Murray MD    ASSISTANT: None    PREOPERATIVE DIAGNOSIS: Visually significant cataract, Right Eye    POSTOPERATIVE DIAGNOSIS: Visually significant cataract,  Right Eye    PROCEDURE PERFORMED: Phacoemulsification of the cataract with posterior chamber intraocular lens Right Eye    IMPLANTS: DCB00 18.5    ANESTHESIA:  Topical with MAC    COMPLICATIONS: None    ESTIMATED BLOOD LOSS: Minimal    PROCEDURE: The patient was brought to the operating room, time out was performed and implant checked.  The patient was given light sedation, and topical anesthesia was instilled in the right eye.  The right eye was prepped and draped in the usual fashion for eye surgery and lid speculum used to retract the eyelid. The eyelashes were secluded within the drape.  A paracentesis was made superiorly with a sideport blade. Epishugarcaine was injected in the anterior chamber and dispersive viscoelastic was injected into the anterior chamber. A temporal corneal incision was made with a steel keratome. A cystitome was used to initiate a continuous curvilinear capsulorrhexis and completed using the capsulorrhexis forceps. Hydrodissection of the lens nucleus was performed using balanced salt solution (BSS) on hydrodissection cannula. The lens nucleus was removed using phacoemulsification in the modified stop and chop technique. The lens cortex was removed using the irrigation/aspiration handpiece. The capsular bag was filled with viscoelastic, and the intraocular lens was injected into the capsular bag under direct visualization. Viscoelastic was removed using the irrigation/aspiration handpiece. The wounds were hydrated until watertight.    The wounds were rechecked and no leakage was noted.  The speculum was removed. Topical antibiotic was applied to the eye and shield was placed over the eye. The patient tolerated the procedure  well and left the operating room in good condition.

## 2020-12-16 NOTE — TRANSFER OF CARE
"Anesthesia Transfer of Care Note    Patient: Efraín Anton    Procedure(s) Performed: Procedure(s) (LRB):  CEIOL OD (Right)    Patient location: PACU    Anesthesia Type: MAC    Transport from OR: Transported from OR on 2-3 L/min O2 by NC with adequate spontaneous ventilation    Post pain: adequate analgesia    Post assessment: no apparent anesthetic complications and tolerated procedure well    Post vital signs: stable    Level of consciousness: awake    Nausea/Vomiting: no nausea/vomiting    Complications: none    Transfer of care protocol was followed      Last vitals:   Visit Vitals  BP (!) 140/77   Pulse 68   Temp 36.6 °C (97.9 °F) (Skin)   Resp 18   Ht 5' 9" (1.753 m)   Wt 88.5 kg (195 lb 1.7 oz)   SpO2 99%   BMI 28.81 kg/m²     "

## 2020-12-16 NOTE — ANESTHESIA POSTPROCEDURE EVALUATION
Anesthesia Post Evaluation    Patient: Efraín Anton    Procedure(s) Performed: Procedure(s) (LRB):  CEIOL OD (Right)    Final Anesthesia Type: MAC    Patient location during evaluation: PACU  Patient participation: Yes- Able to Participate  Level of consciousness: awake and alert  Post-procedure vital signs: reviewed and stable  Pain management: adequate  Airway patency: patent    PONV status at discharge: No PONV  Anesthetic complications: no      Cardiovascular status: hemodynamically stable  Respiratory status: unassisted and room air  Hydration status: euvolemic  Follow-up not needed.          Vitals Value Taken Time   /78 12/16/20 0946   Temp 36.5 °C (97.7 °F) 12/16/20 0935   Pulse 65 12/16/20 0951   Resp 18 12/16/20 0935   SpO2 98 % 12/16/20 0951   Vitals shown include unvalidated device data.      No case tracking events are documented in the log.      Pain/Brannon Score: Brannon Score: 10 (12/16/2020  9:40 AM)

## 2020-12-16 NOTE — DISCHARGE INSTRUCTIONS
Discharge Instructions for Cataract Surgery    USE DROPS AS INSTRUCTED:     PREDNISOLONE  ONE DROP 4 TIMES A DAY  Moxifloxacin ONE DROP 4 TIMES A DAY  KETOROLAC ONE DROP 4 TIMES A DAY     WAIT 2 MINUTES BETWEEN DROPS     BRING ALL EYE DROPS TO YOUR CLINIC VISITS    Wear sunglasses during the day  Do not sleep on the affected side and wear eye shield while sleeping for 2 weeks.  No exercise or lifting greater than 10 lbs for 2 weeks.  Try not to cough. If coughing a lot, take a cough suppressent  Do not bend over for 2 weeks.  Keep water it of your eye for 2 weeks.             Wear sunglasses for comfort as needed.  It is normal to feel a slight irritation, like there is an eyelash in the eye that had surgery.   You may take Tylenol for discomfort but if pain intensifies , call Dr     If you use eye drops for glaucoma you may continue to use them.      After Surgery:  Always be aware that any surgery can cause these symptoms:    Pain- Medication can be prescribed for pain to decrease your pain but may not completely take your pain away.  Over the Counter pain medicine my be enough and you can always use Ice and rest to help ease pain.    Bleeding- a little bleeding after a surgery is usually within normal.  If there is a lot of blood you need to notify your MD.  Emergency treatments of bleeding are cold application, elevation of the bleeding site and compression.    Infection- Infection after surgery is NOT a normal occurrence.  Signs of infection are fever, swelling, hot to touch the incision.  If this occurs notify your MD immediately.    Nausea- this can be common after a surgery especially if you have had anesthesia medicine or are taking pain medicine.  Staying on clear liquids, bland foods, gingerale, or over the counter anti nausea medicines can help.  If you vomit more than once, notify your MD.  Anti Nausea medicines can be prescribed.  Before leaving, please make sure you have all your personal belongings  such as glasses, purses, wallets, keys, cell phones, jewelry, jackets etc

## 2020-12-16 NOTE — H&P
History    Chief complaint:  Painless progressive vision loss right Eye    Present Ilness/Diagnosis: Nuclear sclerotic Cataract right Eye    ROS: +Eyes, otherwise no significant changes    Past Medical History: refer to chart    Family History/Social History: refer to chart    Allergies:   Review of patient's allergies indicates:  No Known Allergies    Current Medications: see medcard      Physical Exam    BP: Vital signs stable  General: No apparent distress  HEENT: cataract  Lungs: adequate respirations  Heart: + pulses  Abdomen: soft  Rectal/pelvic: deferred    Labs: Labs Reviewed    Lab Results   Component Value Date    WBC 6.56 09/30/2020    HGB 13.1 (L) 09/30/2020    HCT 39.1 (L) 09/30/2020    MCV 97 09/30/2020     09/30/2020           CMP  Sodium   Date Value Ref Range Status   09/30/2020 138 136 - 145 mmol/L Final     Potassium   Date Value Ref Range Status   09/30/2020 4.2 3.5 - 5.1 mmol/L Final     Chloride   Date Value Ref Range Status   09/30/2020 103 95 - 110 mmol/L Final     CO2   Date Value Ref Range Status   09/30/2020 27 23 - 29 mmol/L Final     Glucose   Date Value Ref Range Status   09/30/2020 101 70 - 110 mg/dL Final     BUN   Date Value Ref Range Status   09/30/2020 17 8 - 23 mg/dL Final     Creatinine   Date Value Ref Range Status   09/30/2020 1.2 0.5 - 1.4 mg/dL Final     Calcium   Date Value Ref Range Status   09/30/2020 8.7 8.7 - 10.5 mg/dL Final     Total Protein   Date Value Ref Range Status   09/30/2020 6.4 6.0 - 8.4 g/dL Final     Albumin   Date Value Ref Range Status   09/30/2020 3.8 3.5 - 5.2 g/dL Final     Total Bilirubin   Date Value Ref Range Status   09/30/2020 0.6 0.1 - 1.0 mg/dL Final     Comment:     For infants and newborns, interpretation of results should be based  on gestational age, weight and in agreement with clinical  observations.  Premature Infant recommended reference ranges:  Up to 24 hours.............<8.0 mg/dL  Up to 48 hours............<12.0 mg/dL  3-5  days..................<15.0 mg/dL  6-29 days.................<15.0 mg/dL       Alkaline Phosphatase   Date Value Ref Range Status   09/30/2020 98 55 - 135 U/L Final     AST   Date Value Ref Range Status   09/30/2020 18 10 - 40 U/L Final     ALT   Date Value Ref Range Status   09/30/2020 21 10 - 44 U/L Final     Anion Gap   Date Value Ref Range Status   09/30/2020 8 8 - 16 mmol/L Final     eGFR if    Date Value Ref Range Status   09/30/2020 >60 >60 mL/min/1.73 m^2 Final     eGFR if non    Date Value Ref Range Status   09/30/2020 >60 >60 mL/min/1.73 m^2 Final     Comment:     Calculation used to obtain the estimated glomerular filtration  rate (eGFR) is the CKD-EPI equation.        This patient has been cleared for surgery in an ambulatory surgery center.     Impression: Visually significant Cataract right Eye    Plan: Phacoemulsification with implantation of Intraocular lens right Eye

## 2020-12-16 NOTE — ANESTHESIA PREPROCEDURE EVALUATION
12/16/2020  Efraín Anton is a 70 y.o., male.    Anesthesia Evaluation    I have reviewed the Patient Summary Reports.    I have reviewed the Nursing Notes. I have reviewed the NPO Status.   I have reviewed the Medications.     Review of Systems  Anesthesia Hx:  No problems with previous Anesthesia    Social:  Former Smoker    Hematology/Oncology:         -- Cancer in past history:   EENT/Dental:   Right eye cataract   Cardiovascular:   Hypertension    Pulmonary:  Pulmonary Normal    Renal/:  Renal/ Normal     Hepatic/GI:   Hiatal Hernia,    Musculoskeletal:   Arthritis     Dermatological:  Skin Normal    Psych:  Psychiatric Normal           Physical Exam  General:  Well nourished    Airway/Jaw/Neck:  Airway Findings: Mouth Opening: Normal Tongue: Normal  General Airway Assessment: Adult  Mallampati: II  TM Distance: Normal, at least 6 cm        Eyes/Ears/Nose:  EYES/EARS/NOSE FINDINGS: Normal   Dental:  DENTAL FINDINGS: Normal   Chest/Lungs:  Chest/Lungs Findings: Clear to auscultation, Normal Respiratory Rate     Heart/Vascular:  Heart Findings: Rate: Normal  Rhythm: Regular Rhythm        Mental Status:  Mental Status Findings:  Alert and Oriented, Cooperative         Anesthesia Plan  Type of Anesthesia, risks & benefits discussed:  Anesthesia Type:  MAC  Patient's Preference:   Intra-op Monitoring Plan: standard ASA monitors  Intra-op Monitoring Plan Comments:   Post Op Pain Control Plan: multimodal analgesia  Post Op Pain Control Plan Comments:   Induction:   IV  Beta Blocker:  Patient is not currently on a Beta-Blocker (No further documentation required).       Informed Consent: Patient understands risks and agrees with Anesthesia plan.  Questions answered. Anesthesia consent signed with patient.  ASA Score: 2     Day of Surgery Review of History & Physical: I have interviewed and examined the  patient. I have reviewed the patient's H&P dated:    H&P update referred to the surgeon.         Ready For Surgery From Anesthesia Perspective.

## 2020-12-17 ENCOUNTER — OFFICE VISIT (OUTPATIENT)
Dept: OPHTHALMOLOGY | Facility: CLINIC | Age: 70
End: 2020-12-17
Payer: MEDICARE

## 2020-12-17 VITALS
OXYGEN SATURATION: 97 % | HEART RATE: 63 BPM | BODY MASS INDEX: 28.9 KG/M2 | TEMPERATURE: 98 F | HEIGHT: 69 IN | DIASTOLIC BLOOD PRESSURE: 78 MMHG | RESPIRATION RATE: 18 BRPM | SYSTOLIC BLOOD PRESSURE: 137 MMHG | WEIGHT: 195.13 LBS

## 2020-12-17 DIAGNOSIS — Z98.41 STATUS POST CATARACT SURGERY, RIGHT: Primary | ICD-10-CM

## 2020-12-17 PROCEDURE — 1126F PR PAIN SEVERITY QUANTIFIED, NO PAIN PRESENT: ICD-10-PCS | Mod: HCNC,S$GLB,, | Performed by: OPHTHALMOLOGY

## 2020-12-17 PROCEDURE — 99024 PR POST-OP FOLLOW-UP VISIT: ICD-10-PCS | Mod: HCNC,S$GLB,, | Performed by: OPHTHALMOLOGY

## 2020-12-17 PROCEDURE — 1126F AMNT PAIN NOTED NONE PRSNT: CPT | Mod: HCNC,S$GLB,, | Performed by: OPHTHALMOLOGY

## 2020-12-17 PROCEDURE — 99999 PR PBB SHADOW E&M-EST. PATIENT-LVL II: CPT | Mod: PBBFAC,HCNC,, | Performed by: OPHTHALMOLOGY

## 2020-12-17 PROCEDURE — 99024 POSTOP FOLLOW-UP VISIT: CPT | Mod: HCNC,S$GLB,, | Performed by: OPHTHALMOLOGY

## 2020-12-17 PROCEDURE — 99999 PR PBB SHADOW E&M-EST. PATIENT-LVL II: ICD-10-PCS | Mod: PBBFAC,HCNC,, | Performed by: OPHTHALMOLOGY

## 2020-12-17 NOTE — PATIENT INSTRUCTIONS
Dr. Zaid Murray - [facility] Ophthalmology  After Cataract Surgery Post-Operative Instructions      EYE MEDICATIONS    1) Vigamox (Moxifloxacin) - Tan Cap  1 drop to surgical eye 4 x day for 1 week    2) Prednisolone acetate 1% - White or Pink Cap or alternative steroid   1 drop to surgical eye 4 x day for 2 weeks  Then 2 x day for 1 week  Then 1 x day for 1 week then stop    3) Ketorolac (Acular) - Gray Cap  1 drop to surgical eye 1 x day for 4 weeks      Wait 3-5 minutes between drops when taking them close together.    Please bring your instruction sheets and all eye drops with you when you come for your follow-up visit at the eye clinic.    If the eye has pain, increasing redness, or worsening of your vision, you should immediately call [facility] at [phone] during daytime or [phone] during the evening.       OTHER INSRUCTIONS      If you are taking glaucoma drops, continue them unchanged unless your doctor informs you otherwise.  Continue your regular drops in the other eye if you are on any.      Protect the eye 24 hours a day for 1 week.  Use glasses or sunglasses during the day.  Use the eye shield at night while sleeping with adhesive tape; no gauze.    No eye rubbing.  Secretions may be gently removed with a moist tissue or gauze.  Avoid pressure on the eye.    No swimming or putting your face under water.  Do not get the eye wet for 1 week.  Shower or baths are permitted - being careful not to get water into the eye or rub it.  Clean your face with a damp washcloth.      No heavy lifting, bending, or straining for 2 weeks.   Do not bend your head below your chest.  Bend only at the knees.  Do not lift anything heavier than 2 gallons of milk.  Do not do anything that makes your face turn red.  If you have a cough, take cough medicine.  If you have constipation, take a laxative or stool softener.    Eat your normal diet.    Everything should stay about the same or slowly get better.  If  things get worse, that is, pain, increasing redness, or worsening of your vision, you should call [facility] at [phone] during daytime or [phone] during the evening.      Please bring your instruction sheets and all eye medications with you when you come for all your follow-up visit at the eye clinic.

## 2020-12-17 NOTE — DISCHARGE SUMMARY
[facility]  Discharge Note  Short Stay    Procedure(s) (LRB):  CEIOL OD (Right)    OUTCOME: Patient tolerated treatment/procedure well without complication and is now ready for discharge.    DISPOSITION: Home or Self Care    FINAL DIAGNOSIS:  <principal problem not specified>    FOLLOWUP: In clinic    DISCHARGE INSTRUCTIONS:  No discharge procedures on file.     Clinical Reference Documents Added to Patient Instructions       Document    CATARACT SURGERY, DISCHARGE INSTRUCTIONS (ENGLISH)

## 2020-12-17 NOTE — PROGRESS NOTES
HPI     1 day s/p phaco IOL OD done 12/16/2020. Pt states OD is throbbing a bit.   Started using moxi 3 days prior to surgery as directed. States started   using pred, moxi, and ketorolac this morning as directed.     Last edited by Adrienne Bob on 12/17/2020  9:44 AM. (History)            Assessment /Plan     For exam results, see Encounter Report.    Status post cataract surgery, right      1. Status post cataract surgery, right    IOP spike, burped at slit lamp to normal IOP, vigamox instilled after    Doing well  Post op precautions and instructions reviewed, sheet given  moxi QID  PF QID  Keto daily    F/u 2 week

## 2020-12-18 ENCOUNTER — LAB VISIT (OUTPATIENT)
Dept: LAB | Facility: HOSPITAL | Age: 70
End: 2020-12-18
Attending: FAMILY MEDICINE
Payer: MEDICARE

## 2020-12-18 DIAGNOSIS — Z12.5 PROSTATE CANCER SCREENING: ICD-10-CM

## 2020-12-18 DIAGNOSIS — Z20.822 EXPOSURE TO COVID-19 VIRUS: ICD-10-CM

## 2020-12-18 DIAGNOSIS — I10 ESSENTIAL HYPERTENSION: ICD-10-CM

## 2020-12-18 LAB
ALBUMIN SERPL BCP-MCNC: 3.8 G/DL (ref 3.5–5.2)
ALP SERPL-CCNC: 111 U/L (ref 55–135)
ALT SERPL W/O P-5'-P-CCNC: 34 U/L (ref 10–44)
ANION GAP SERPL CALC-SCNC: 9 MMOL/L (ref 8–16)
AST SERPL-CCNC: 24 U/L (ref 10–40)
BASOPHILS # BLD AUTO: 0.02 K/UL (ref 0–0.2)
BASOPHILS NFR BLD: 0.3 % (ref 0–1.9)
BILIRUB SERPL-MCNC: 0.8 MG/DL (ref 0.1–1)
BUN SERPL-MCNC: 22 MG/DL (ref 8–23)
CALCIUM SERPL-MCNC: 9.4 MG/DL (ref 8.7–10.5)
CHLORIDE SERPL-SCNC: 105 MMOL/L (ref 95–110)
CHOLEST SERPL-MCNC: 194 MG/DL (ref 120–199)
CHOLEST/HDLC SERPL: 4 {RATIO} (ref 2–5)
CO2 SERPL-SCNC: 29 MMOL/L (ref 23–29)
COMPLEXED PSA SERPL-MCNC: <0.01 NG/ML (ref 0–4)
CREAT SERPL-MCNC: 1.1 MG/DL (ref 0.5–1.4)
DIFFERENTIAL METHOD: ABNORMAL
EOSINOPHIL # BLD AUTO: 0.5 K/UL (ref 0–0.5)
EOSINOPHIL NFR BLD: 6.6 % (ref 0–8)
ERYTHROCYTE [DISTWIDTH] IN BLOOD BY AUTOMATED COUNT: 13 % (ref 11.5–14.5)
EST. GFR  (AFRICAN AMERICAN): >60 ML/MIN/1.73 M^2
EST. GFR  (NON AFRICAN AMERICAN): >60 ML/MIN/1.73 M^2
GLUCOSE SERPL-MCNC: 90 MG/DL (ref 70–110)
HCT VFR BLD AUTO: 46.8 % (ref 40–54)
HDLC SERPL-MCNC: 49 MG/DL (ref 40–75)
HDLC SERPL: 25.3 % (ref 20–50)
HGB BLD-MCNC: 15.3 G/DL (ref 14–18)
IMM GRANULOCYTES # BLD AUTO: 0.02 K/UL (ref 0–0.04)
IMM GRANULOCYTES NFR BLD AUTO: 0.3 % (ref 0–0.5)
LDLC SERPL CALC-MCNC: 112.8 MG/DL (ref 63–159)
LYMPHOCYTES # BLD AUTO: 3 K/UL (ref 1–4.8)
LYMPHOCYTES NFR BLD: 38 % (ref 18–48)
MCH RBC QN AUTO: 31.4 PG (ref 27–31)
MCHC RBC AUTO-ENTMCNC: 32.7 G/DL (ref 32–36)
MCV RBC AUTO: 96 FL (ref 82–98)
MONOCYTES # BLD AUTO: 0.8 K/UL (ref 0.3–1)
MONOCYTES NFR BLD: 9.9 % (ref 4–15)
NEUTROPHILS # BLD AUTO: 3.5 K/UL (ref 1.8–7.7)
NEUTROPHILS NFR BLD: 44.9 % (ref 38–73)
NONHDLC SERPL-MCNC: 145 MG/DL
NRBC BLD-RTO: 0 /100 WBC
PLATELET # BLD AUTO: 258 K/UL (ref 150–350)
PMV BLD AUTO: 10.3 FL (ref 9.2–12.9)
POTASSIUM SERPL-SCNC: 4.9 MMOL/L (ref 3.5–5.1)
PROT SERPL-MCNC: 7.1 G/DL (ref 6–8.4)
RBC # BLD AUTO: 4.87 M/UL (ref 4.6–6.2)
SARS-COV-2 IGG SERPLBLD QL IA.RAPID: NEGATIVE
SODIUM SERPL-SCNC: 143 MMOL/L (ref 136–145)
TRIGL SERPL-MCNC: 161 MG/DL (ref 30–150)
WBC # BLD AUTO: 7.76 K/UL (ref 3.9–12.7)

## 2020-12-18 PROCEDURE — 85025 COMPLETE CBC W/AUTO DIFF WBC: CPT | Mod: HCNC

## 2020-12-18 PROCEDURE — 80061 LIPID PANEL: CPT | Mod: HCNC

## 2020-12-18 PROCEDURE — 84153 ASSAY OF PSA TOTAL: CPT | Mod: HCNC

## 2020-12-18 PROCEDURE — 86769 SARS-COV-2 COVID-19 ANTIBODY: CPT | Mod: HCNC

## 2020-12-18 PROCEDURE — 80053 COMPREHEN METABOLIC PANEL: CPT | Mod: HCNC

## 2020-12-22 ENCOUNTER — OFFICE VISIT (OUTPATIENT)
Dept: FAMILY MEDICINE | Facility: CLINIC | Age: 70
End: 2020-12-22
Payer: MEDICARE

## 2020-12-22 VITALS
BODY MASS INDEX: 29.95 KG/M2 | OXYGEN SATURATION: 96 % | WEIGHT: 202.19 LBS | RESPIRATION RATE: 14 BRPM | TEMPERATURE: 98 F | HEIGHT: 69 IN | HEART RATE: 73 BPM | SYSTOLIC BLOOD PRESSURE: 132 MMHG | DIASTOLIC BLOOD PRESSURE: 70 MMHG

## 2020-12-22 DIAGNOSIS — K21.00 GASTROESOPHAGEAL REFLUX DISEASE WITH ESOPHAGITIS, UNSPECIFIED WHETHER HEMORRHAGE: ICD-10-CM

## 2020-12-22 DIAGNOSIS — Z12.5 PROSTATE CANCER SCREENING: ICD-10-CM

## 2020-12-22 DIAGNOSIS — Z85.46 HISTORY OF PROSTATE CANCER: ICD-10-CM

## 2020-12-22 DIAGNOSIS — I10 ESSENTIAL HYPERTENSION: Primary | ICD-10-CM

## 2020-12-22 PROCEDURE — 3008F BODY MASS INDEX DOCD: CPT | Mod: HCNC,CPTII,S$GLB, | Performed by: FAMILY MEDICINE

## 2020-12-22 PROCEDURE — 3288F FALL RISK ASSESSMENT DOCD: CPT | Mod: HCNC,CPTII,S$GLB, | Performed by: FAMILY MEDICINE

## 2020-12-22 PROCEDURE — 3078F PR MOST RECENT DIASTOLIC BLOOD PRESSURE < 80 MM HG: ICD-10-PCS | Mod: HCNC,CPTII,S$GLB, | Performed by: FAMILY MEDICINE

## 2020-12-22 PROCEDURE — 1101F PT FALLS ASSESS-DOCD LE1/YR: CPT | Mod: HCNC,CPTII,S$GLB, | Performed by: FAMILY MEDICINE

## 2020-12-22 PROCEDURE — 1159F MED LIST DOCD IN RCRD: CPT | Mod: HCNC,S$GLB,, | Performed by: FAMILY MEDICINE

## 2020-12-22 PROCEDURE — 3075F PR MOST RECENT SYSTOLIC BLOOD PRESS GE 130-139MM HG: ICD-10-PCS | Mod: HCNC,CPTII,S$GLB, | Performed by: FAMILY MEDICINE

## 2020-12-22 PROCEDURE — 3075F SYST BP GE 130 - 139MM HG: CPT | Mod: HCNC,CPTII,S$GLB, | Performed by: FAMILY MEDICINE

## 2020-12-22 PROCEDURE — 3008F PR BODY MASS INDEX (BMI) DOCUMENTED: ICD-10-PCS | Mod: HCNC,CPTII,S$GLB, | Performed by: FAMILY MEDICINE

## 2020-12-22 PROCEDURE — 1101F PR PT FALLS ASSESS DOC 0-1 FALLS W/OUT INJ PAST YR: ICD-10-PCS | Mod: HCNC,CPTII,S$GLB, | Performed by: FAMILY MEDICINE

## 2020-12-22 PROCEDURE — 99999 PR PBB SHADOW E&M-EST. PATIENT-LVL III: ICD-10-PCS | Mod: PBBFAC,HCNC,, | Performed by: FAMILY MEDICINE

## 2020-12-22 PROCEDURE — 1126F AMNT PAIN NOTED NONE PRSNT: CPT | Mod: HCNC,S$GLB,, | Performed by: FAMILY MEDICINE

## 2020-12-22 PROCEDURE — 1159F PR MEDICATION LIST DOCUMENTED IN MEDICAL RECORD: ICD-10-PCS | Mod: HCNC,S$GLB,, | Performed by: FAMILY MEDICINE

## 2020-12-22 PROCEDURE — 1126F PR PAIN SEVERITY QUANTIFIED, NO PAIN PRESENT: ICD-10-PCS | Mod: HCNC,S$GLB,, | Performed by: FAMILY MEDICINE

## 2020-12-22 PROCEDURE — 3078F DIAST BP <80 MM HG: CPT | Mod: HCNC,CPTII,S$GLB, | Performed by: FAMILY MEDICINE

## 2020-12-22 PROCEDURE — 99214 OFFICE O/P EST MOD 30 MIN: CPT | Mod: HCNC,S$GLB,, | Performed by: FAMILY MEDICINE

## 2020-12-22 PROCEDURE — 99999 PR PBB SHADOW E&M-EST. PATIENT-LVL III: CPT | Mod: PBBFAC,HCNC,, | Performed by: FAMILY MEDICINE

## 2020-12-22 PROCEDURE — 99214 PR OFFICE/OUTPT VISIT, EST, LEVL IV, 30-39 MIN: ICD-10-PCS | Mod: HCNC,S$GLB,, | Performed by: FAMILY MEDICINE

## 2020-12-22 PROCEDURE — 3288F PR FALLS RISK ASSESSMENT DOCUMENTED: ICD-10-PCS | Mod: HCNC,CPTII,S$GLB, | Performed by: FAMILY MEDICINE

## 2020-12-22 NOTE — PROGRESS NOTES
Subjective:       Patient ID: Efraín Anton is a 70 y.o. male.    Chief Complaint: Follow-up (6mth f/u hypertension)    HPI  Review of Systems   Constitutional: Negative for fatigue and unexpected weight change.   Respiratory: Negative for chest tightness and shortness of breath.    Cardiovascular: Negative for chest pain, palpitations and leg swelling.   Gastrointestinal: Negative for abdominal pain.   Musculoskeletal: Negative for arthralgias.   Neurological: Negative for dizziness, syncope, light-headedness and headaches.       Patient Active Problem List   Diagnosis    Hypertension    Primary osteoarthritis of both shoulders    Eczema    Esophageal stenosis    Hiatal hernia    Gastroesophageal reflux disease with esophagitis    History of prostate cancer    Full thickness macular hole, stage 2, right    Hypertensive retinopathy, bilateral    NS (nuclear sclerosis), bilateral    Choroidal nevus, left    Nuclear sclerotic cataract, bilateral     Patient is here for a chronic conditions follow up.    [facility] h/o prostate cancer s/p prostatectomy 2018     Eye Dr. lacey-htn retinopathy and macular hole. Terri recent cataract surgery.  Does not feel vision is better in left eye. Has f/u next week     Reviewed labs 12/20   Objective:      Physical Exam  Vitals signs and nursing note reviewed.   Constitutional:       Appearance: He is well-developed.   Cardiovascular:      Rate and Rhythm: Normal rate and regular rhythm.      Heart sounds: Normal heart sounds.   Pulmonary:      Effort: Pulmonary effort is normal.      Breath sounds: Normal breath sounds.   Skin:     General: Skin is warm and dry.   Neurological:      Mental Status: He is alert and oriented to person, place, and time.         Assessment:       1. Essential hypertension    2. History of prostate cancer    3. Gastroesophageal reflux disease with esophagitis, unspecified whether hemorrhage    4. Prostate cancer screening        Plan:          1. Essential hypertension  Controlled on current medications.  Continue current medications.    - CBC Auto Differential; Future  - Comprehensive Metabolic Panel; Future  - Lipid Panel; Future    2. History of prostate cancer  Cont va monitoring    3. Gastroesophageal reflux disease with esophagitis, unspecified whether hemorrhage  Controlled on current medications.  Continue current medications.      4. Prostate cancer screening  Undetectable. Cont yearly monitoring        Time spent with patient: 20 minutes    Patient with be reevaluated in 1 year or sooner prn    Greater than 50% of this visit was spent counseling as described in above documentation:Yes

## 2020-12-29 ENCOUNTER — OFFICE VISIT (OUTPATIENT)
Dept: OPHTHALMOLOGY | Facility: CLINIC | Age: 70
End: 2020-12-29
Payer: MEDICARE

## 2020-12-29 DIAGNOSIS — Z98.41 STATUS POST CATARACT SURGERY, RIGHT: Primary | ICD-10-CM

## 2020-12-29 PROCEDURE — 1126F AMNT PAIN NOTED NONE PRSNT: CPT | Mod: HCNC,S$GLB,, | Performed by: OPHTHALMOLOGY

## 2020-12-29 PROCEDURE — 99999 PR PBB SHADOW E&M-EST. PATIENT-LVL II: CPT | Mod: PBBFAC,HCNC,, | Performed by: OPHTHALMOLOGY

## 2020-12-29 PROCEDURE — 99024 PR POST-OP FOLLOW-UP VISIT: ICD-10-PCS | Mod: HCNC,S$GLB,, | Performed by: OPHTHALMOLOGY

## 2020-12-29 PROCEDURE — 99024 POSTOP FOLLOW-UP VISIT: CPT | Mod: HCNC,S$GLB,, | Performed by: OPHTHALMOLOGY

## 2020-12-29 PROCEDURE — 99999 PR PBB SHADOW E&M-EST. PATIENT-LVL II: ICD-10-PCS | Mod: PBBFAC,HCNC,, | Performed by: OPHTHALMOLOGY

## 2020-12-29 PROCEDURE — 1126F PR PAIN SEVERITY QUANTIFIED, NO PAIN PRESENT: ICD-10-PCS | Mod: HCNC,S$GLB,, | Performed by: OPHTHALMOLOGY

## 2020-12-29 RX ORDER — DORZOLAMIDE HYDROCHLORIDE AND TIMOLOL MALEATE 20; 5 MG/ML; MG/ML
1 SOLUTION/ DROPS OPHTHALMIC EVERY 12 HOURS
Qty: 10 ML | Refills: 1 | Status: SHIPPED | OUTPATIENT
Start: 2020-12-29 | End: 2023-05-09

## 2020-12-29 NOTE — PROGRESS NOTES
HPI     Post-op Evaluation      Additional comments: od              Comments     2 week post Phaco IOL Right eye - Denies eye pain no new floaters or   flashes since eye surgery. States that the vision does not seem much   improved. Feels he can see something then image blurs out on him has to   blink.  Using Ketorolac OD Daily, Pred OD QID.           Last edited by Pamela Russell on 12/29/2020  1:16 PM. (History)            Assessment /Plan     For exam results, see Encounter Report.    Status post cataract surgery, right      POW2 CEIOL  IOP spike persisting, improved with combigan in clinic today, ?steroid response  D/c moxi  Continue PF taper  Continue keto qdaily  Start dorz/sagrario bid OD    Post op instructions reviewed    F/u 3-4 weeks, dilate OD

## 2021-01-21 ENCOUNTER — OFFICE VISIT (OUTPATIENT)
Dept: OPHTHALMOLOGY | Facility: CLINIC | Age: 71
End: 2021-01-21
Payer: MEDICARE

## 2021-01-21 DIAGNOSIS — Z98.41 STATUS POST CATARACT SURGERY, RIGHT: Primary | ICD-10-CM

## 2021-01-21 PROCEDURE — 3288F FALL RISK ASSESSMENT DOCD: CPT | Mod: CPTII,S$GLB,, | Performed by: OPHTHALMOLOGY

## 2021-01-21 PROCEDURE — 99999 PR PBB SHADOW E&M-EST. PATIENT-LVL II: CPT | Mod: PBBFAC,,, | Performed by: OPHTHALMOLOGY

## 2021-01-21 PROCEDURE — 99999 PR PBB SHADOW E&M-EST. PATIENT-LVL II: ICD-10-PCS | Mod: PBBFAC,,, | Performed by: OPHTHALMOLOGY

## 2021-01-21 PROCEDURE — 1126F PR PAIN SEVERITY QUANTIFIED, NO PAIN PRESENT: ICD-10-PCS | Mod: S$GLB,,, | Performed by: OPHTHALMOLOGY

## 2021-01-21 PROCEDURE — 3288F PR FALLS RISK ASSESSMENT DOCUMENTED: ICD-10-PCS | Mod: CPTII,S$GLB,, | Performed by: OPHTHALMOLOGY

## 2021-01-21 PROCEDURE — 99024 POSTOP FOLLOW-UP VISIT: CPT | Mod: S$GLB,,, | Performed by: OPHTHALMOLOGY

## 2021-01-21 PROCEDURE — 1126F AMNT PAIN NOTED NONE PRSNT: CPT | Mod: S$GLB,,, | Performed by: OPHTHALMOLOGY

## 2021-01-21 PROCEDURE — 1101F PR PT FALLS ASSESS DOC 0-1 FALLS W/OUT INJ PAST YR: ICD-10-PCS | Mod: CPTII,S$GLB,, | Performed by: OPHTHALMOLOGY

## 2021-01-21 PROCEDURE — 1101F PT FALLS ASSESS-DOCD LE1/YR: CPT | Mod: CPTII,S$GLB,, | Performed by: OPHTHALMOLOGY

## 2021-01-21 PROCEDURE — 99024 PR POST-OP FOLLOW-UP VISIT: ICD-10-PCS | Mod: S$GLB,,, | Performed by: OPHTHALMOLOGY

## 2021-04-07 ENCOUNTER — PES CALL (OUTPATIENT)
Dept: ADMINISTRATIVE | Facility: CLINIC | Age: 71
End: 2021-04-07

## 2021-06-23 ENCOUNTER — PES CALL (OUTPATIENT)
Dept: ADMINISTRATIVE | Facility: CLINIC | Age: 71
End: 2021-06-23

## 2021-11-23 ENCOUNTER — PES CALL (OUTPATIENT)
Dept: ADMINISTRATIVE | Facility: CLINIC | Age: 71
End: 2021-11-23
Payer: MEDICARE

## 2021-12-21 ENCOUNTER — TELEPHONE (OUTPATIENT)
Dept: FAMILY MEDICINE | Facility: CLINIC | Age: 71
End: 2021-12-21
Payer: MEDICARE

## 2021-12-22 ENCOUNTER — OFFICE VISIT (OUTPATIENT)
Dept: FAMILY MEDICINE | Facility: CLINIC | Age: 71
End: 2021-12-22
Payer: MEDICARE

## 2021-12-22 VITALS
HEART RATE: 89 BPM | SYSTOLIC BLOOD PRESSURE: 130 MMHG | HEIGHT: 69 IN | WEIGHT: 193.13 LBS | DIASTOLIC BLOOD PRESSURE: 80 MMHG | TEMPERATURE: 99 F | BODY MASS INDEX: 28.6 KG/M2 | OXYGEN SATURATION: 98 % | RESPIRATION RATE: 18 BRPM

## 2021-12-22 DIAGNOSIS — I10 ESSENTIAL HYPERTENSION: Primary | ICD-10-CM

## 2021-12-22 DIAGNOSIS — Z12.5 PROSTATE CANCER SCREENING: ICD-10-CM

## 2021-12-22 DIAGNOSIS — Z85.46 HISTORY OF PROSTATE CANCER: ICD-10-CM

## 2021-12-22 DIAGNOSIS — K21.00 GASTROESOPHAGEAL REFLUX DISEASE WITH ESOPHAGITIS, UNSPECIFIED WHETHER HEMORRHAGE: ICD-10-CM

## 2021-12-22 DIAGNOSIS — M15.9 PRIMARY OSTEOARTHRITIS INVOLVING MULTIPLE JOINTS: ICD-10-CM

## 2021-12-22 PROCEDURE — 1126F AMNT PAIN NOTED NONE PRSNT: CPT | Mod: HCNC,CPTII,S$GLB, | Performed by: FAMILY MEDICINE

## 2021-12-22 PROCEDURE — 1159F MED LIST DOCD IN RCRD: CPT | Mod: HCNC,CPTII,S$GLB, | Performed by: FAMILY MEDICINE

## 2021-12-22 PROCEDURE — 4010F ACE/ARB THERAPY RXD/TAKEN: CPT | Mod: HCNC,CPTII,S$GLB, | Performed by: FAMILY MEDICINE

## 2021-12-22 PROCEDURE — 3075F PR MOST RECENT SYSTOLIC BLOOD PRESS GE 130-139MM HG: ICD-10-PCS | Mod: HCNC,CPTII,S$GLB, | Performed by: FAMILY MEDICINE

## 2021-12-22 PROCEDURE — 3288F FALL RISK ASSESSMENT DOCD: CPT | Mod: HCNC,CPTII,S$GLB, | Performed by: FAMILY MEDICINE

## 2021-12-22 PROCEDURE — 4010F PR ACE/ARB THEARPY RXD/TAKEN: ICD-10-PCS | Mod: HCNC,CPTII,S$GLB, | Performed by: FAMILY MEDICINE

## 2021-12-22 PROCEDURE — 99499 UNLISTED E&M SERVICE: CPT | Mod: S$GLB,,, | Performed by: FAMILY MEDICINE

## 2021-12-22 PROCEDURE — 99999 PR PBB SHADOW E&M-EST. PATIENT-LVL III: CPT | Mod: PBBFAC,HCNC,, | Performed by: FAMILY MEDICINE

## 2021-12-22 PROCEDURE — 1126F PR PAIN SEVERITY QUANTIFIED, NO PAIN PRESENT: ICD-10-PCS | Mod: HCNC,CPTII,S$GLB, | Performed by: FAMILY MEDICINE

## 2021-12-22 PROCEDURE — 1101F PR PT FALLS ASSESS DOC 0-1 FALLS W/OUT INJ PAST YR: ICD-10-PCS | Mod: HCNC,CPTII,S$GLB, | Performed by: FAMILY MEDICINE

## 2021-12-22 PROCEDURE — 3288F PR FALLS RISK ASSESSMENT DOCUMENTED: ICD-10-PCS | Mod: HCNC,CPTII,S$GLB, | Performed by: FAMILY MEDICINE

## 2021-12-22 PROCEDURE — 3008F PR BODY MASS INDEX (BMI) DOCUMENTED: ICD-10-PCS | Mod: HCNC,CPTII,S$GLB, | Performed by: FAMILY MEDICINE

## 2021-12-22 PROCEDURE — 99214 OFFICE O/P EST MOD 30 MIN: CPT | Mod: HCNC,S$GLB,, | Performed by: FAMILY MEDICINE

## 2021-12-22 PROCEDURE — 3079F DIAST BP 80-89 MM HG: CPT | Mod: HCNC,CPTII,S$GLB, | Performed by: FAMILY MEDICINE

## 2021-12-22 PROCEDURE — 3075F SYST BP GE 130 - 139MM HG: CPT | Mod: HCNC,CPTII,S$GLB, | Performed by: FAMILY MEDICINE

## 2021-12-22 PROCEDURE — 3008F BODY MASS INDEX DOCD: CPT | Mod: HCNC,CPTII,S$GLB, | Performed by: FAMILY MEDICINE

## 2021-12-22 PROCEDURE — 1160F RVW MEDS BY RX/DR IN RCRD: CPT | Mod: HCNC,CPTII,S$GLB, | Performed by: FAMILY MEDICINE

## 2021-12-22 PROCEDURE — 99999 PR PBB SHADOW E&M-EST. PATIENT-LVL III: ICD-10-PCS | Mod: PBBFAC,HCNC,, | Performed by: FAMILY MEDICINE

## 2021-12-22 PROCEDURE — 99499 RISK ADDL DX/OHS AUDIT: ICD-10-PCS | Mod: S$GLB,,, | Performed by: FAMILY MEDICINE

## 2021-12-22 PROCEDURE — 99214 PR OFFICE/OUTPT VISIT, EST, LEVL IV, 30-39 MIN: ICD-10-PCS | Mod: HCNC,S$GLB,, | Performed by: FAMILY MEDICINE

## 2021-12-22 PROCEDURE — 1159F PR MEDICATION LIST DOCUMENTED IN MEDICAL RECORD: ICD-10-PCS | Mod: HCNC,CPTII,S$GLB, | Performed by: FAMILY MEDICINE

## 2021-12-22 PROCEDURE — 1101F PT FALLS ASSESS-DOCD LE1/YR: CPT | Mod: HCNC,CPTII,S$GLB, | Performed by: FAMILY MEDICINE

## 2021-12-22 PROCEDURE — 1160F PR REVIEW ALL MEDS BY PRESCRIBER/CLIN PHARMACIST DOCUMENTED: ICD-10-PCS | Mod: HCNC,CPTII,S$GLB, | Performed by: FAMILY MEDICINE

## 2021-12-22 PROCEDURE — 3079F PR MOST RECENT DIASTOLIC BLOOD PRESSURE 80-89 MM HG: ICD-10-PCS | Mod: HCNC,CPTII,S$GLB, | Performed by: FAMILY MEDICINE

## 2021-12-22 RX ORDER — MELOXICAM 7.5 MG/1
7.5 TABLET ORAL DAILY
Qty: 90 TABLET | Refills: 3 | Status: SHIPPED | OUTPATIENT
Start: 2021-12-22 | End: 2022-11-25

## 2022-05-03 ENCOUNTER — TELEPHONE (OUTPATIENT)
Dept: FAMILY MEDICINE | Facility: CLINIC | Age: 72
End: 2022-05-03
Payer: MEDICARE

## 2022-11-28 ENCOUNTER — OFFICE VISIT (OUTPATIENT)
Dept: FAMILY MEDICINE | Facility: CLINIC | Age: 72
End: 2022-11-28
Payer: MEDICARE

## 2022-11-28 VITALS
OXYGEN SATURATION: 97 % | WEIGHT: 195.13 LBS | HEART RATE: 69 BPM | DIASTOLIC BLOOD PRESSURE: 70 MMHG | HEIGHT: 69 IN | SYSTOLIC BLOOD PRESSURE: 136 MMHG | TEMPERATURE: 98 F | BODY MASS INDEX: 28.9 KG/M2

## 2022-11-28 DIAGNOSIS — Z85.46 HISTORY OF PROSTATE CANCER: ICD-10-CM

## 2022-11-28 DIAGNOSIS — Z12.11 COLON CANCER SCREENING: ICD-10-CM

## 2022-11-28 DIAGNOSIS — I10 ESSENTIAL HYPERTENSION: Primary | ICD-10-CM

## 2022-11-28 PROCEDURE — 99999 PR PBB SHADOW E&M-EST. PATIENT-LVL III: CPT | Mod: PBBFAC,,, | Performed by: NURSE PRACTITIONER

## 2022-11-28 PROCEDURE — 1101F PR PT FALLS ASSESS DOC 0-1 FALLS W/OUT INJ PAST YR: ICD-10-PCS | Mod: CPTII,S$GLB,, | Performed by: NURSE PRACTITIONER

## 2022-11-28 PROCEDURE — 99999 PR PBB SHADOW E&M-EST. PATIENT-LVL III: ICD-10-PCS | Mod: PBBFAC,,, | Performed by: NURSE PRACTITIONER

## 2022-11-28 PROCEDURE — 3078F DIAST BP <80 MM HG: CPT | Mod: CPTII,S$GLB,, | Performed by: NURSE PRACTITIONER

## 2022-11-28 PROCEDURE — 1159F PR MEDICATION LIST DOCUMENTED IN MEDICAL RECORD: ICD-10-PCS | Mod: CPTII,S$GLB,, | Performed by: NURSE PRACTITIONER

## 2022-11-28 PROCEDURE — 99214 PR OFFICE/OUTPT VISIT, EST, LEVL IV, 30-39 MIN: ICD-10-PCS | Mod: S$GLB,,, | Performed by: NURSE PRACTITIONER

## 2022-11-28 PROCEDURE — 1126F PR PAIN SEVERITY QUANTIFIED, NO PAIN PRESENT: ICD-10-PCS | Mod: CPTII,S$GLB,, | Performed by: NURSE PRACTITIONER

## 2022-11-28 PROCEDURE — 1126F AMNT PAIN NOTED NONE PRSNT: CPT | Mod: CPTII,S$GLB,, | Performed by: NURSE PRACTITIONER

## 2022-11-28 PROCEDURE — 3288F PR FALLS RISK ASSESSMENT DOCUMENTED: ICD-10-PCS | Mod: CPTII,S$GLB,, | Performed by: NURSE PRACTITIONER

## 2022-11-28 PROCEDURE — 3288F FALL RISK ASSESSMENT DOCD: CPT | Mod: CPTII,S$GLB,, | Performed by: NURSE PRACTITIONER

## 2022-11-28 PROCEDURE — 1101F PT FALLS ASSESS-DOCD LE1/YR: CPT | Mod: CPTII,S$GLB,, | Performed by: NURSE PRACTITIONER

## 2022-11-28 PROCEDURE — 3008F PR BODY MASS INDEX (BMI) DOCUMENTED: ICD-10-PCS | Mod: CPTII,S$GLB,, | Performed by: NURSE PRACTITIONER

## 2022-11-28 PROCEDURE — 99214 OFFICE O/P EST MOD 30 MIN: CPT | Mod: S$GLB,,, | Performed by: NURSE PRACTITIONER

## 2022-11-28 PROCEDURE — 3078F PR MOST RECENT DIASTOLIC BLOOD PRESSURE < 80 MM HG: ICD-10-PCS | Mod: CPTII,S$GLB,, | Performed by: NURSE PRACTITIONER

## 2022-11-28 PROCEDURE — 3075F SYST BP GE 130 - 139MM HG: CPT | Mod: CPTII,S$GLB,, | Performed by: NURSE PRACTITIONER

## 2022-11-28 PROCEDURE — 1159F MED LIST DOCD IN RCRD: CPT | Mod: CPTII,S$GLB,, | Performed by: NURSE PRACTITIONER

## 2022-11-28 PROCEDURE — 3008F BODY MASS INDEX DOCD: CPT | Mod: CPTII,S$GLB,, | Performed by: NURSE PRACTITIONER

## 2022-11-28 PROCEDURE — 3075F PR MOST RECENT SYSTOLIC BLOOD PRESS GE 130-139MM HG: ICD-10-PCS | Mod: CPTII,S$GLB,, | Performed by: NURSE PRACTITIONER

## 2022-11-28 NOTE — PATIENT INSTRUCTIONS
Parag HAYNES,     If you are due for any health screening(s) below please notify me so we can arrange them to be ordered and scheduled to maintain your health. Most healthy patients complete it. Don't lose out on improving your health.     All of your core healthy metrics are met.

## 2022-11-28 NOTE — PROGRESS NOTES
This dictation has been generated using Modal Fluency Dictation some phonetic errors may occur. Please contact author for clarification if needed.     Problem List Items Addressed This Visit       Essential hypertension - Primary    Relevant Orders    Comprehensive Metabolic Panel    Lipid Panel    History of prostate cancer     Other Visit Diagnoses       Colon cancer screening        Relevant Orders    Case Request Endoscopy: COLONOSCOPY (Completed)            Orders Placed This Encounter    Comprehensive Metabolic Panel    Lipid Panel    Case Request Endoscopy: COLONOSCOPY     Hypertension check labs as above.  I will review address accordingly.    Needs update of colonoscopy  History of prostate cancer. Prostatectomy 2018    Follow up in about 6 months (around 5/28/2023).    ________________________________________________________________  ________________________________________________________________      Chief Complaint   Patient presents with    Annual Exam     History of present illness  This 72 y.o. presents today for complaint of hypertension history of prostate cancer and due for colon cancer screening.  Here for annual exam.  Update labs as advised.  Discussed colonoscopy with patient.  Continue current therapies.  Needs to follow-up with ophthalmologist Dr. Brown-htn retinopathy and macular hole. Terri prior cataract surgery.         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    Multiple rib fractures     Prostate cancer 8/20/2018    Prostate enlargement     Tinnitus of right ear 6/13/2016    Tinnitus, objective        Past Surgical History:   Procedure Laterality Date    CATARACT EXTRACTION Right 12/16/2020    zaid murray     CATARACT EXTRACTION W/  INTRAOCULAR LENS IMPLANT Right 12/16/2020    Procedure: CEIOL OD;  Surgeon: Zaid Murray MD;  Location: [facility] OR;  Service: Ophthalmology;  Laterality: Right;     COLONOSCOPY W/ POLYPECTOMY      PROSTATE BIOPSY      PROSTATECTOMY  2018    VITRECTOMY BY PARS PLANA APPROACH Right 2018    Procedure: VITRECTOMY, PARS PLANA APPROACH;  Surgeon: CLAUDINE Melendrez MD;  Location: [facility] OR [address] [place] [address];  Service: Ophthalmology;  Laterality: Right;  40 min       Family History   Problem Relation Age of Onset    Heart disease Father 82        acute MI    Diabetes Mother     Hypertension Sister     No Known Problems Daughter     Hypertension Sister     No Known Problems Sister     No Known Problems Daughter     Glaucoma Neg Hx     Macular degeneration Neg Hx     Retinal detachment Neg Hx        Social History     Socioeconomic History    Marital status:    Tobacco Use    Smoking status: Former     Packs/day: 3.00     Years: 25.00     Pack years: 75.00     Types: Cigarettes     Start date: 1963     Quit date: 3/27/1983     Years since quittin.7    Smokeless tobacco: Never   Substance and Sexual Activity    Alcohol use: Not Currently    Drug use: No    Sexual activity: Not Currently       Current Outpatient Medications   Medication Sig Dispense Refill    lisinopriL (PRINIVIL,ZESTRIL) 5 MG tablet TAKE 1 TABLET EVERY DAY 90 tablet 3    meloxicam (MOBIC) 7.5 MG tablet TAKE 1 TABLET ONE TIME DAILY WITH FOOD AS NEEDED FOR ARTHRITIS 90 tablet 0    sildenafiL (VIAGRA) 100 MG tablet TAKE ONE TABLET BY MOUTH EVERY WEEK AS NEEDED 30 TO 60 MINUTES BEFORE INTERCOURSE FOR BEST RESULTS TAKE ON EMPTY STOMACH      dorzolamide-timolol 2-0.5% (COSOPT) 22.3-6.8 mg/mL ophthalmic solution Place 1 drop into the right eye every 12 (twelve) hours. 10 mL 1    triamcinolone acetonide 0.1% (KENALOG) 0.1 % cream Apply topically 2 (two) times daily. for 10 days 80 g 0     No current facility-administered medications for this visit.       Review of patient's allergies indicates:  No Known Allergies    Physical examination  Vitals Reviewed\  Vitals:    22 0912   BP: 136/70   Pulse: 69   Temp:  98.3 °F (36.8 °C)     Body mass index is 28.81 kg/m².    Weight: 88.5 kg (195 lb 1.7 oz)    Gen. Well-dressed well-nourished   Skin warm dry and intact.  No rashes noted.  Neck is supple without adenopathy  Chest.  Respirations are even unlabored.  Lungs are clear to auscultation.  Cardiac regular rate and rhythm.  No chest wall adenopathy noted.  Neuro. Awake alert oriented x4.  Normal judgment and cognition noted.  Extremities no clubbing cyanosis or edema noted.     Call or return to clinic prn if these symptoms worsen or fail to improve as anticipated.

## 2022-11-30 ENCOUNTER — TELEPHONE (OUTPATIENT)
Dept: GASTROENTEROLOGY | Facility: CLINIC | Age: 72
End: 2022-11-30
Payer: MEDICARE

## 2023-01-25 DIAGNOSIS — I10 ESSENTIAL HYPERTENSION: ICD-10-CM

## 2023-01-26 RX ORDER — LISINOPRIL 5 MG/1
5 TABLET ORAL DAILY
Qty: 90 TABLET | Refills: 3 | Status: SHIPPED | OUTPATIENT
Start: 2023-01-26 | End: 2023-01-27 | Stop reason: SDUPTHER

## 2023-01-27 DIAGNOSIS — I10 ESSENTIAL HYPERTENSION: ICD-10-CM

## 2023-01-27 RX ORDER — LISINOPRIL 5 MG/1
5 TABLET ORAL DAILY
Qty: 90 TABLET | Refills: 3 | Status: SHIPPED | OUTPATIENT
Start: 2023-01-27 | End: 2024-01-16 | Stop reason: SDUPTHER

## 2023-01-27 NOTE — TELEPHONE ENCOUNTER
Care Due:                  Date            Visit Type   Department     Provider  --------------------------------------------------------------------------------                                EP -                              PRIMARY      [facility]  Last Visit: 12-      CARE ([facility])   PARAS Nix                              EP -                              PRIMARY      [facility]  Next Visit: 05-      CARE ([facility])   MEDICINE       Sandi Nix                                                            Last  Test          Frequency    Reason                     Performed    Due Date  --------------------------------------------------------------------------------    Office Visit  12 months..  lisinopriL...............  12- 12-    CMP.........  12 months..  lisinopriL...............  Not Found    Overdue    [employer] Embedded Care Gaps. Reference number: [id number]. 1/27/2023   2:08:57 PM CST

## 2023-03-03 ENCOUNTER — PES CALL (OUTPATIENT)
Dept: ADMINISTRATIVE | Facility: CLINIC | Age: 73
End: 2023-03-03
Payer: MEDICARE

## 2023-04-28 ENCOUNTER — TELEPHONE (OUTPATIENT)
Dept: ADMINISTRATIVE | Facility: CLINIC | Age: 73
End: 2023-04-28
Payer: MEDICARE

## 2023-05-08 ENCOUNTER — TELEPHONE (OUTPATIENT)
Dept: ADMINISTRATIVE | Facility: CLINIC | Age: 73
End: 2023-05-08
Payer: MEDICARE

## 2023-05-08 ENCOUNTER — PES CALL (OUTPATIENT)
Dept: ADMINISTRATIVE | Facility: CLINIC | Age: 73
End: 2023-05-08
Payer: MEDICARE

## 2023-05-09 ENCOUNTER — OFFICE VISIT (OUTPATIENT)
Dept: FAMILY MEDICINE | Facility: CLINIC | Age: 73
End: 2023-05-09
Payer: MEDICARE

## 2023-05-09 VITALS
TEMPERATURE: 98 F | HEIGHT: 69 IN | SYSTOLIC BLOOD PRESSURE: 132 MMHG | OXYGEN SATURATION: 98 % | HEART RATE: 85 BPM | WEIGHT: 198 LBS | DIASTOLIC BLOOD PRESSURE: 70 MMHG | BODY MASS INDEX: 29.33 KG/M2

## 2023-05-09 DIAGNOSIS — Z12.11 SCREENING FOR MALIGNANT NEOPLASM OF COLON: ICD-10-CM

## 2023-05-09 DIAGNOSIS — Z00.00 ENCOUNTER FOR PREVENTIVE HEALTH EXAMINATION: Primary | ICD-10-CM

## 2023-05-09 DIAGNOSIS — Z85.46 HISTORY OF PROSTATE CANCER: ICD-10-CM

## 2023-05-09 DIAGNOSIS — I10 ESSENTIAL HYPERTENSION: ICD-10-CM

## 2023-05-09 PROCEDURE — 4010F ACE/ARB THERAPY RXD/TAKEN: CPT | Mod: HCNC,CPTII,S$GLB, | Performed by: NURSE PRACTITIONER

## 2023-05-09 PROCEDURE — 3075F SYST BP GE 130 - 139MM HG: CPT | Mod: HCNC,CPTII,S$GLB, | Performed by: NURSE PRACTITIONER

## 2023-05-09 PROCEDURE — 1170F PR FUNCTIONAL STATUS ASSESSED: ICD-10-PCS | Mod: HCNC,CPTII,S$GLB, | Performed by: NURSE PRACTITIONER

## 2023-05-09 PROCEDURE — G0439 PPPS, SUBSEQ VISIT: HCPCS | Mod: HCNC,S$GLB,, | Performed by: NURSE PRACTITIONER

## 2023-05-09 PROCEDURE — 1126F PR PAIN SEVERITY QUANTIFIED, NO PAIN PRESENT: ICD-10-PCS | Mod: HCNC,CPTII,S$GLB, | Performed by: NURSE PRACTITIONER

## 2023-05-09 PROCEDURE — 1101F PT FALLS ASSESS-DOCD LE1/YR: CPT | Mod: HCNC,CPTII,S$GLB, | Performed by: NURSE PRACTITIONER

## 2023-05-09 PROCEDURE — 1160F RVW MEDS BY RX/DR IN RCRD: CPT | Mod: HCNC,CPTII,S$GLB, | Performed by: NURSE PRACTITIONER

## 2023-05-09 PROCEDURE — 3288F PR FALLS RISK ASSESSMENT DOCUMENTED: ICD-10-PCS | Mod: HCNC,CPTII,S$GLB, | Performed by: NURSE PRACTITIONER

## 2023-05-09 PROCEDURE — 1159F MED LIST DOCD IN RCRD: CPT | Mod: HCNC,CPTII,S$GLB, | Performed by: NURSE PRACTITIONER

## 2023-05-09 PROCEDURE — 1160F PR REVIEW ALL MEDS BY PRESCRIBER/CLIN PHARMACIST DOCUMENTED: ICD-10-PCS | Mod: HCNC,CPTII,S$GLB, | Performed by: NURSE PRACTITIONER

## 2023-05-09 PROCEDURE — 1101F PR PT FALLS ASSESS DOC 0-1 FALLS W/OUT INJ PAST YR: ICD-10-PCS | Mod: HCNC,CPTII,S$GLB, | Performed by: NURSE PRACTITIONER

## 2023-05-09 PROCEDURE — 3078F DIAST BP <80 MM HG: CPT | Mod: HCNC,CPTII,S$GLB, | Performed by: NURSE PRACTITIONER

## 2023-05-09 PROCEDURE — 3288F FALL RISK ASSESSMENT DOCD: CPT | Mod: HCNC,CPTII,S$GLB, | Performed by: NURSE PRACTITIONER

## 2023-05-09 PROCEDURE — 99999 PR PBB SHADOW E&M-EST. PATIENT-LVL IV: ICD-10-PCS | Mod: PBBFAC,HCNC,, | Performed by: NURSE PRACTITIONER

## 2023-05-09 PROCEDURE — 3075F PR MOST RECENT SYSTOLIC BLOOD PRESS GE 130-139MM HG: ICD-10-PCS | Mod: HCNC,CPTII,S$GLB, | Performed by: NURSE PRACTITIONER

## 2023-05-09 PROCEDURE — 1159F PR MEDICATION LIST DOCUMENTED IN MEDICAL RECORD: ICD-10-PCS | Mod: HCNC,CPTII,S$GLB, | Performed by: NURSE PRACTITIONER

## 2023-05-09 PROCEDURE — 99999 PR PBB SHADOW E&M-EST. PATIENT-LVL IV: CPT | Mod: PBBFAC,HCNC,, | Performed by: NURSE PRACTITIONER

## 2023-05-09 PROCEDURE — 1126F AMNT PAIN NOTED NONE PRSNT: CPT | Mod: HCNC,CPTII,S$GLB, | Performed by: NURSE PRACTITIONER

## 2023-05-09 PROCEDURE — G0439 PR MEDICARE ANNUAL WELLNESS SUBSEQUENT VISIT: ICD-10-PCS | Mod: HCNC,S$GLB,, | Performed by: NURSE PRACTITIONER

## 2023-05-09 PROCEDURE — 4010F PR ACE/ARB THEARPY RXD/TAKEN: ICD-10-PCS | Mod: HCNC,CPTII,S$GLB, | Performed by: NURSE PRACTITIONER

## 2023-05-09 PROCEDURE — 1170F FXNL STATUS ASSESSED: CPT | Mod: HCNC,CPTII,S$GLB, | Performed by: NURSE PRACTITIONER

## 2023-05-09 PROCEDURE — 3078F PR MOST RECENT DIASTOLIC BLOOD PRESSURE < 80 MM HG: ICD-10-PCS | Mod: HCNC,CPTII,S$GLB, | Performed by: NURSE PRACTITIONER

## 2023-05-09 RX ORDER — TADALAFIL 5 MG/1
5 TABLET ORAL
COMMUNITY
Start: 2023-01-31

## 2023-05-09 NOTE — PROGRESS NOTES
"  Efraín Anton presented for a  Medicare AWV and comprehensive Health Risk Assessment today. The following components were reviewed and updated:    Medical history  Family History  Social history  Allergies and Current Medications  Health Risk Assessment  Health Maintenance  Care Team         ** See Completed Assessments for Annual Wellness Visit within the encounter summary.**         The following assessments were completed:  Living Situation  CAGE  Depression Screening  Timed Get Up and Go  Whisper Test  Cognitive Function Screening  Nutrition Screening  ADL Screening  PAQ Screening    Clock in media     Vitals:    05/09/23 1250   BP: 132/70   Pulse: 85   Temp: 98 °F (36.7 °C)   TempSrc: Oral   SpO2: 98%   Weight: 89.8 kg (197 lb 15.6 oz)   Height: 5' 9" (1.753 m)     Body mass index is 29.24 kg/m².  Physical Exam  Constitutional:       Appearance: He is well-developed.   HENT:      Head: Normocephalic and atraumatic.      Right Ear: Hearing normal.      Left Ear: Hearing normal.      Nose: Nose normal.   Eyes:      General: Lids are normal.      Conjunctiva/sclera: Conjunctivae normal.      Pupils: Pupils are equal, round, and reactive to light.   Cardiovascular:      Rate and Rhythm: Normal rate.   Pulmonary:      Effort: Pulmonary effort is normal.   Abdominal:      Palpations: Abdomen is soft.   Musculoskeletal:         General: Normal range of motion.      Cervical back: Normal range of motion and neck supple.   Skin:     General: Skin is warm and dry.   Neurological:      Mental Status: He is alert and oriented to person, place, and time.             Diagnoses and health risks identified today and associated recommendations/orders:    1. Encounter for preventive health examination  Discussed health maintenance guidelines appropriate for age.    Review for Opioid Screening: Patient does not have rx for Opioids.    Review for Substance Use Disorders: Patient does not use substance.      2. Screening for " malignant neoplasm of colon  - Case Request Endoscopy: COLONOSCOPY    3. Essential hypertension  Controlled, continue current medication regimen  Low salt diet  Increase physical activity  Followed by pcp      4. History of prostate cancer  Stable, continue care and monitoring per urology       Provided Efraín HAYNES with a 5-10 year written screening schedule and personal prevention plan. Recommendations were developed using the USPSTF age appropriate recommendations. Education, counseling, and referrals were provided as needed. After Visit Summary printed and given to patient which includes a list of additional screenings\tests needed.    Follow up for One year for Annual Wellness Visit.    Ariana Cobos, NP      I offered to discuss advanced care planning, including how to pick a person who would make decisions for you if you were unable to make them for yourself, called a health care power of , and what kind of decisions you might make such as use of life sustaining treatments such as ventilators and tube feeding when faced with a life limiting illness recorded on a living will that they will need to know. (How you want to be cared for as you near the end of your natural life)     X Patient is interested in learning more about how to make advanced directives.  I provided them paperwork and offered to discuss this with them.  Efraín HAYNES Sloane presented for a  Medicare AWV and comprehensive Health Risk Assessment today. The following components were reviewed and updated:    Medical history  Family History  Social history  Allergies and Current Medications  Health Risk Assessment  Health Maintenance  Care Team         **

## 2023-05-09 NOTE — PATIENT INSTRUCTIONS
Counseling and Referral of Other Preventative  (Italic type indicates deductible and co-insurance are waived)    Patient Name: Efraín Anton  Today's Date: 5/9/2023    Health Maintenance       Date Due Completion Date    COVID-19 Vaccine (1) Never done ---    Colorectal Cancer Screening 12/20/2022 12/20/2017    Override on 11/1/2011: [facility] [place] NATIVIDAD Arguelles--in media)    Influenza Vaccine (Season Ended) 09/01/2023 12/3/2021    Override on 11/25/2019: Done    Lipid Panel 12/18/2025 12/18/2020    TETANUS VACCINE 11/06/2027 11/6/2017        No orders of the defined types were placed in this encounter.      The following information is provided to all patients.  This information is to help you find resources for any of the problems found today that may be affecting your health:                Living healthy guide: www.[facility].[place]      Understanding Diabetes: www.diabetes.org      Eating healthy: www.cdc.gov/healthyweight      [facility] home safety checklist: www.cdc.gov/steadi/patient.html      Agency on Aging: www.goea.[place]      Alcoholics anonymous (AA): www.aa.org      Physical Activity: www.loli.nih.gov/wj1yekw      Tobacco use: www.quitwithusla.org

## 2023-05-10 ENCOUNTER — TELEPHONE (OUTPATIENT)
Dept: GASTROENTEROLOGY | Facility: CLINIC | Age: 73
End: 2023-05-10
Payer: MEDICARE

## 2023-05-10 NOTE — TELEPHONE ENCOUNTER
Colonoscopy 7/28 at 10am arrive for 9am instructions reviewed and patient states understanding. Copy mailed and address verified no active portal

## 2023-05-22 ENCOUNTER — LAB VISIT (OUTPATIENT)
Dept: LAB | Facility: HOSPITAL | Age: 73
End: 2023-05-22
Attending: NURSE PRACTITIONER
Payer: MEDICARE

## 2023-05-22 DIAGNOSIS — I10 ESSENTIAL HYPERTENSION: ICD-10-CM

## 2023-05-22 LAB
ALBUMIN SERPL BCP-MCNC: 4.1 G/DL (ref 3.5–5.2)
ALP SERPL-CCNC: 110 U/L (ref 55–135)
ALT SERPL W/O P-5'-P-CCNC: 16 U/L (ref 10–44)
ANION GAP SERPL CALC-SCNC: 6 MMOL/L (ref 8–16)
AST SERPL-CCNC: 14 U/L (ref 10–40)
BILIRUB SERPL-MCNC: 1.2 MG/DL (ref 0.1–1)
BUN SERPL-MCNC: 18 MG/DL (ref 8–23)
CALCIUM SERPL-MCNC: 9.6 MG/DL (ref 8.7–10.5)
CHLORIDE SERPL-SCNC: 105 MMOL/L (ref 95–110)
CHOLEST SERPL-MCNC: 182 MG/DL (ref 120–199)
CHOLEST/HDLC SERPL: 4.8 {RATIO} (ref 2–5)
CO2 SERPL-SCNC: 29 MMOL/L (ref 23–29)
CREAT SERPL-MCNC: 1.2 MG/DL (ref 0.5–1.4)
EST. GFR  (NO RACE VARIABLE): >60 ML/MIN/1.73 M^2
GLUCOSE SERPL-MCNC: 88 MG/DL (ref 70–110)
HDLC SERPL-MCNC: 38 MG/DL (ref 40–75)
HDLC SERPL: 20.9 % (ref 20–50)
LDLC SERPL CALC-MCNC: 120 MG/DL (ref 63–159)
NONHDLC SERPL-MCNC: 144 MG/DL
POTASSIUM SERPL-SCNC: 4.9 MMOL/L (ref 3.5–5.1)
PROT SERPL-MCNC: 6.6 G/DL (ref 6–8.4)
SODIUM SERPL-SCNC: 140 MMOL/L (ref 136–145)
TRIGL SERPL-MCNC: 120 MG/DL (ref 30–150)

## 2023-05-22 PROCEDURE — 36415 COLL VENOUS BLD VENIPUNCTURE: CPT | Mod: PO | Performed by: NURSE PRACTITIONER

## 2023-05-22 PROCEDURE — 80053 COMPREHEN METABOLIC PANEL: CPT | Performed by: NURSE PRACTITIONER

## 2023-05-22 PROCEDURE — 80061 LIPID PANEL: CPT | Performed by: NURSE PRACTITIONER

## 2023-05-30 ENCOUNTER — OFFICE VISIT (OUTPATIENT)
Dept: FAMILY MEDICINE | Facility: CLINIC | Age: 73
End: 2023-05-30
Payer: MEDICARE

## 2023-05-30 VITALS
SYSTOLIC BLOOD PRESSURE: 110 MMHG | BODY MASS INDEX: 29.29 KG/M2 | HEART RATE: 79 BPM | TEMPERATURE: 99 F | HEIGHT: 69 IN | RESPIRATION RATE: 18 BRPM | OXYGEN SATURATION: 96 % | DIASTOLIC BLOOD PRESSURE: 68 MMHG | WEIGHT: 197.75 LBS

## 2023-05-30 DIAGNOSIS — I10 ESSENTIAL HYPERTENSION: Primary | ICD-10-CM

## 2023-05-30 DIAGNOSIS — H35.033 HYPERTENSIVE RETINOPATHY, BILATERAL: ICD-10-CM

## 2023-05-30 DIAGNOSIS — K21.00 GASTROESOPHAGEAL REFLUX DISEASE WITH ESOPHAGITIS, UNSPECIFIED WHETHER HEMORRHAGE: ICD-10-CM

## 2023-05-30 DIAGNOSIS — Z85.46 HISTORY OF PROSTATE CANCER: ICD-10-CM

## 2023-05-30 DIAGNOSIS — E78.5 HYPERLIPIDEMIA, UNSPECIFIED HYPERLIPIDEMIA TYPE: ICD-10-CM

## 2023-05-30 DIAGNOSIS — K44.9 HIATAL HERNIA: ICD-10-CM

## 2023-05-30 PROCEDURE — 4010F PR ACE/ARB THEARPY RXD/TAKEN: ICD-10-PCS | Mod: CPTII,S$GLB,, | Performed by: FAMILY MEDICINE

## 2023-05-30 PROCEDURE — 1159F MED LIST DOCD IN RCRD: CPT | Mod: CPTII,S$GLB,, | Performed by: FAMILY MEDICINE

## 2023-05-30 PROCEDURE — 99214 PR OFFICE/OUTPT VISIT, EST, LEVL IV, 30-39 MIN: ICD-10-PCS | Mod: S$GLB,,, | Performed by: FAMILY MEDICINE

## 2023-05-30 PROCEDURE — 1159F PR MEDICATION LIST DOCUMENTED IN MEDICAL RECORD: ICD-10-PCS | Mod: CPTII,S$GLB,, | Performed by: FAMILY MEDICINE

## 2023-05-30 PROCEDURE — 99999 PR PBB SHADOW E&M-EST. PATIENT-LVL III: ICD-10-PCS | Mod: PBBFAC,,, | Performed by: FAMILY MEDICINE

## 2023-05-30 PROCEDURE — 99999 PR PBB SHADOW E&M-EST. PATIENT-LVL III: CPT | Mod: PBBFAC,,, | Performed by: FAMILY MEDICINE

## 2023-05-30 PROCEDURE — 1160F PR REVIEW ALL MEDS BY PRESCRIBER/CLIN PHARMACIST DOCUMENTED: ICD-10-PCS | Mod: CPTII,S$GLB,, | Performed by: FAMILY MEDICINE

## 2023-05-30 PROCEDURE — 3078F PR MOST RECENT DIASTOLIC BLOOD PRESSURE < 80 MM HG: ICD-10-PCS | Mod: CPTII,S$GLB,, | Performed by: FAMILY MEDICINE

## 2023-05-30 PROCEDURE — 3078F DIAST BP <80 MM HG: CPT | Mod: CPTII,S$GLB,, | Performed by: FAMILY MEDICINE

## 2023-05-30 PROCEDURE — 4010F ACE/ARB THERAPY RXD/TAKEN: CPT | Mod: CPTII,S$GLB,, | Performed by: FAMILY MEDICINE

## 2023-05-30 PROCEDURE — 3074F PR MOST RECENT SYSTOLIC BLOOD PRESSURE < 130 MM HG: ICD-10-PCS | Mod: CPTII,S$GLB,, | Performed by: FAMILY MEDICINE

## 2023-05-30 PROCEDURE — 1160F RVW MEDS BY RX/DR IN RCRD: CPT | Mod: CPTII,S$GLB,, | Performed by: FAMILY MEDICINE

## 2023-05-30 PROCEDURE — 3074F SYST BP LT 130 MM HG: CPT | Mod: CPTII,S$GLB,, | Performed by: FAMILY MEDICINE

## 2023-05-30 PROCEDURE — 99214 OFFICE O/P EST MOD 30 MIN: CPT | Mod: S$GLB,,, | Performed by: FAMILY MEDICINE

## 2023-05-30 PROCEDURE — 1126F AMNT PAIN NOTED NONE PRSNT: CPT | Mod: CPTII,S$GLB,, | Performed by: FAMILY MEDICINE

## 2023-05-30 PROCEDURE — 1126F PR PAIN SEVERITY QUANTIFIED, NO PAIN PRESENT: ICD-10-PCS | Mod: CPTII,S$GLB,, | Performed by: FAMILY MEDICINE

## 2023-05-30 NOTE — PROGRESS NOTES
Subjective:       Patient ID: Efraín Anton is a 73 y.o. male.    Chief Complaint: Follow-up (6mth f/u)    HPI  Review of Systems   Constitutional:  Negative for fatigue and unexpected weight change.   Respiratory:  Negative for chest tightness and shortness of breath.    Cardiovascular:  Negative for chest pain, palpitations and leg swelling.   Gastrointestinal:  Negative for abdominal pain.   Musculoskeletal:  Negative for arthralgias.   Neurological:  Negative for dizziness, syncope, light-headedness and headaches.     Patient Active Problem List   Diagnosis    Essential hypertension    Primary osteoarthritis of both shoulders    Eczema    Esophageal stenosis    Hiatal hernia    Gastroesophageal reflux disease with esophagitis    History of prostate cancer    Full thickness macular hole, stage 2, right    Hypertensive retinopathy, bilateral    NS (nuclear sclerosis), bilateral    Choroidal nevus, left    Nuclear sclerotic cataract, bilateral     Patient is here for a chronic conditions follow up.    Reviewed labs 5/2023   Discussed recent dementia screening AWV .  Has no concerns. Denies memory issues.        GI Dr. Acosta colonoscopy planned 7/28/23 , 2017 no polyps    [facility] h/o prostate cancer s/p prostatectomy 2018. PSA undetectable      Eye Dr. lacey-htn retinopathy and macular hole. Terri did cataract surgery.     Objective:      Physical Exam  Vitals and nursing note reviewed.   Constitutional:       Appearance: He is well-developed.   Cardiovascular:      Rate and Rhythm: Normal rate and regular rhythm.      Heart sounds: Normal heart sounds.   Pulmonary:      Effort: Pulmonary effort is normal.      Breath sounds: Normal breath sounds.   Skin:     General: Skin is warm and dry.   Neurological:      Mental Status: He is alert and oriented to person, place, and time.       Assessment:       1. Essential hypertension    2. Gastroesophageal reflux disease with esophagitis, unspecified whether hemorrhage     3. Hiatal hernia    4. History of prostate cancer    5. Hypertensive retinopathy, bilateral    6. Hyperlipidemia, unspecified hyperlipidemia type        Plan:         1. Essential hypertension  Controlled on current medications.  Continue current medications.    - CBC Auto Differential; Future  - Comprehensive Metabolic Panel; Future  - Lipid Panel; Future    2. Gastroesophageal reflux disease with esophagitis, unspecified whether hemorrhage  Controlled on current medications.  Continue current medications.      3. Hiatal hernia  Controlled on current medications.  Continue current medications.      4. History of prostate cancer  Cont psa monitoring and [facility] consult    5. Hypertensive retinopathy, bilateral  Cont eye specialty care    6. Hyperlipidemia, unspecified hyperlipidemia type  I recommend a heart healthy diet rich in fiber, fresh vegetables and fruit and low in saturated fats (fried foods, red meat, etc.).  I also recommend regular exercise including a minimum of 150 minutes of cardio exercise per week and 2-30 minute workouts of strength training like light weights, yoga, pilates, etc.  You should work toward a body mass index of < 25.          Time spent with patient:  10-20 min    Patient with be reevaluated in  6 months  or sooner prn    Greater than 50% of this visit was spent counseling as described in above documentation:Yes

## 2023-06-13 NOTE — TELEPHONE ENCOUNTER
----- Message from Jenniffer Vega sent at 12/28/2018  9:13 AM CST -----  Contact: [facility]   Type:  RX Refill Request    Who Called:  [employer]  Refill or New Rx:  Refill   RX Name and lisinopril (PRINIVIL,ZESTRIL) 5 MG tablet  How is the patient currently taking it? (ex. 1XDay):  Take 1 tablet (5 mg total) by mouth once daily.  Is this a 30 day or 90 day RX:  90  Preferred Pharmacy with phone number:      [facility] Mail Delivery - [facility] [address]  [address] [facility] [address] [address] [address]  Phone: [phone] Fax: [phone]      Local or Mail Order:  Mail   Ordering Provider:  Boyd Dan Call Back Number:  [phone] (home)     Additional Information:            lisinopril (PRINIVIL,ZESTRIL) 5 MG tablet   Unna Boot Text: An Unna boot was placed to help immobilize the limb and facilitate more rapid healing.  Patient was provided instructions on removing the Unna boot at home in 1 week

## 2023-07-27 ENCOUNTER — TELEPHONE (OUTPATIENT)
Dept: GASTROENTEROLOGY | Facility: CLINIC | Age: 73
End: 2023-07-27
Payer: MEDICARE

## 2023-07-27 NOTE — TELEPHONE ENCOUNTER
----- Message from Terese Aguillon sent at 7/27/2023 11:36 AM CDT -----  Contact: self  Type:  Needs Medical Advice    Who Called: Pt   Symptoms (please be specific): Need clarification for prep   Would the patient rather a call back or a response via [facility]?  call  Best Call Back Number: [phone]  Additional Information:  Please call to clarify...     Thank you...

## 2023-07-28 ENCOUNTER — HOSPITAL ENCOUNTER (OUTPATIENT)
Facility: HOSPITAL | Age: 73
Discharge: HOME | End: 2023-07-28
Attending: INTERNAL MEDICINE | Admitting: INTERNAL MEDICINE
Payer: MEDICARE

## 2023-07-28 ENCOUNTER — ANESTHESIA (OUTPATIENT)
Dept: ENDOSCOPY | Facility: HOSPITAL | Age: 73
End: 2023-07-28
Payer: MEDICARE

## 2023-07-28 ENCOUNTER — ANESTHESIA EVENT (OUTPATIENT)
Dept: ENDOSCOPY | Facility: HOSPITAL | Age: 73
End: 2023-07-28
Payer: MEDICARE

## 2023-07-28 VITALS
RESPIRATION RATE: 20 BRPM | TEMPERATURE: 98 F | OXYGEN SATURATION: 93 % | HEART RATE: 72 BPM | BODY MASS INDEX: 29.18 KG/M2 | WEIGHT: 197 LBS | SYSTOLIC BLOOD PRESSURE: 127 MMHG | DIASTOLIC BLOOD PRESSURE: 62 MMHG | HEIGHT: 69 IN

## 2023-07-28 DIAGNOSIS — Z12.11 SCREENING FOR MALIGNANT NEOPLASM OF COLON: ICD-10-CM

## 2023-07-28 PROCEDURE — G0105 COLORECTAL SCRN; HI RISK IND: ICD-10-PCS | Mod: ,,, | Performed by: INTERNAL MEDICINE

## 2023-07-28 PROCEDURE — 25000003 PHARM REV CODE 250: Performed by: INTERNAL MEDICINE

## 2023-07-28 PROCEDURE — 37000008 HC ANESTHESIA 1ST 15 MINUTES: Performed by: INTERNAL MEDICINE

## 2023-07-28 PROCEDURE — D9220A PRA ANESTHESIA: Mod: ANES,,, | Performed by: ANESTHESIOLOGY

## 2023-07-28 PROCEDURE — D9220A PRA ANESTHESIA: ICD-10-PCS | Mod: CRNA,,, | Performed by: NURSE ANESTHETIST, CERTIFIED REGISTERED

## 2023-07-28 PROCEDURE — 37000009 HC ANESTHESIA EA ADD 15 MINS: Performed by: INTERNAL MEDICINE

## 2023-07-28 PROCEDURE — D9220A PRA ANESTHESIA: Mod: CRNA,,, | Performed by: NURSE ANESTHETIST, CERTIFIED REGISTERED

## 2023-07-28 PROCEDURE — 63600175 PHARM REV CODE 636 W HCPCS: Performed by: NURSE ANESTHETIST, CERTIFIED REGISTERED

## 2023-07-28 PROCEDURE — G0105 COLORECTAL SCRN; HI RISK IND: HCPCS | Mod: ,,, | Performed by: INTERNAL MEDICINE

## 2023-07-28 PROCEDURE — D9220A PRA ANESTHESIA: ICD-10-PCS | Mod: ANES,,, | Performed by: ANESTHESIOLOGY

## 2023-07-28 PROCEDURE — G0105 COLORECTAL SCRN; HI RISK IND: HCPCS | Performed by: INTERNAL MEDICINE

## 2023-07-28 PROCEDURE — 25000003 PHARM REV CODE 250: Performed by: NURSE ANESTHETIST, CERTIFIED REGISTERED

## 2023-07-28 RX ORDER — PROPOFOL 10 MG/ML
VIAL (ML) INTRAVENOUS
Status: DISCONTINUED | OUTPATIENT
Start: 2023-07-28 | End: 2023-07-28

## 2023-07-28 RX ORDER — LIDOCAINE HYDROCHLORIDE 20 MG/ML
INJECTION INTRAVENOUS
Status: DISCONTINUED | OUTPATIENT
Start: 2023-07-28 | End: 2023-07-28

## 2023-07-28 RX ORDER — SODIUM CHLORIDE 9 MG/ML
INJECTION, SOLUTION INTRAVENOUS CONTINUOUS
Status: DISCONTINUED | OUTPATIENT
Start: 2023-07-28 | End: 2023-07-28 | Stop reason: HOSPADM

## 2023-07-28 RX ADMIN — PROPOFOL 50 MG: 10 INJECTION, EMULSION INTRAVENOUS at 10:07

## 2023-07-28 RX ADMIN — LIDOCAINE HYDROCHLORIDE 100 MG: 20 INJECTION, SOLUTION INTRAVENOUS at 10:07

## 2023-07-28 RX ADMIN — SODIUM CHLORIDE: 0.9 INJECTION, SOLUTION INTRAVENOUS at 10:07

## 2023-07-28 NOTE — PROVATION PATIENT INSTRUCTIONS
Discharge Summary/Instructions after an Endoscopic Procedure  Patient Name: Efraín Anton  Patient MRN: [id number]  Patient YOB: 1950  Friday, July 28, 2023  Xi Acosta MD  Dear patient,  As a result of recent federal legislation (The Federal Cures Act), you may   receive lab or pathology results from your procedure in your [facility]   account before your physician is able to contact you. Your physician or   their representative will relay the results to you with their   recommendations at their soonest availability.  Thank you,  RESTRICTIONS:  During your procedure today, you received medications for sedation.  These   medications may affect your judgment, balance and coordination.  Therefore,   for 24 hours, you have the following restrictions:   - DO NOT drive a car, operate machinery, make legal/financial decisions,   sign important papers or drink alcohol.    ACTIVITY:  Today: no heavy lifting, straining or running due to procedural   sedation/anesthesia.  The following day: return to full activity including work.  DIET:  Eat and drink normally unless instructed otherwise.     TREATMENT FOR COMMON SIDE EFFECTS:  - Mild abdominal pain, nausea, belching, bloating or excessive gas:  rest,   eat lightly and use a heating pad.  - Sore Throat: treat with throat lozenges and/or gargle with warm salt   water.  - Because air was used during the procedure, expelling large amounts of air   from your rectum or belching is normal.  - If a bowel prep was taken, you may not have a bowel movement for 1-3 days.    This is normal.  SYMPTOMS TO WATCH FOR AND REPORT TO YOUR PHYSICIAN:  1. Abdominal pain or bloating, other than gas cramps.  2. Chest pain.  3. Back pain.  4. Signs of infection such as: chills or fever occurring within 24 hours   after the procedure.  5. Rectal bleeding, which would show as bright red, maroon, or black stools.   (A tablespoon of blood from the rectum is not serious,  especially if   hemorrhoids are present.)  6. Vomiting.  7. Weakness or dizziness.  GO DIRECTLY TO THE NEAREST EMERGENCY ROOM IF YOU HAVE ANY OF THE FOLLOWING:      Difficulty breathing              Chills and/or fever over 101 F   Persistent vomiting and/or vomiting blood   Severe abdominal pain   Severe chest pain   Black, tarry stools   Bleeding- more than one tablespoon   Any other symptom or condition that you feel may need urgent attention  Your doctor recommends these additional instructions:  If any biopsies were taken, your doctors clinic will contact you in 1 to 2   weeks with any results.  - Discharge patient to home (with escort).   - Patient has a contact number available for emergencies.  The signs and   symptoms of potential delayed complications were discussed with the   patient.  Return to normal activities tomorrow.  Written discharge   instructions were provided to the patient.   - Resume previous diet.   - Continue present medications.   - No repeat colonoscopy due to current age (66 years or older) and the   absence of colonic polyps.   - Return to my office PRN.  For questions, problems or results please call your physician - Xi Acosta MD at Work:  [phone].  [facility], EMERGENCY ROOM PHONE NUMBER: [phone]  IF A COMPLICATION OR EMERGENCY SITUATION ARISES AND YOU ARE UNABLE TO REACH   YOUR PHYSICIAN - GO DIRECTLY TO THE EMERGENCY ROOM.  Xi Acosta MD  7/28/2023 10:46:50 AM  This report has been verified and signed electronically.  Dear patient,  As a result of recent federal legislation (The Federal Cures Act), you may   receive lab or pathology results from your procedure in your [facility]   account before your physician is able to contact you. Your physician or   their representative will relay the results to you with their   recommendations at their soonest availability.  Thank you,  PROVATION

## 2023-07-28 NOTE — H&P
[facility] Gastroenterology Note    CC: Screening colonoscopy    HPI 73 y.o. male presents for screening colonoscopy. Last colonoscopy 2017    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    Multiple rib fractures     Prostate cancer 8/20/2018    Prostate enlargement     Tinnitus of right ear 6/13/2016    Tinnitus, objective        Allergies and Medications reviewed     Review of Systems  General ROS: negative for - chills, fever or weight loss  Cardiovascular ROS: no chest pain or dyspnea on exertion  Gastrointestinal ROS: no blood in stool    Physical Examination  There were no vitals taken for this visit.  General appearance: alert, cooperative, no distress  HENT: Normocephalic, atraumatic, neck symmetrical, no nasal discharge, sclera anicteric   Lungs: clear to auscultation bilaterally, symmetric chest wall expansion bilaterally  Heart: regular rate and rhythm without rub; no displacement of the PMI   Abdomen: soft  Extremities: extremities symmetric; no clubbing, cyanosis, or edema        Labs:  Lab Results   Component Value Date    WBC 7.76 12/18/2020    HGB 15.3 12/18/2020    HCT 46.8 12/18/2020    MCV 96 12/18/2020     12/18/2020           Assessment:   73 y.o. male presents for colonoscopy    Plan:  -Proceed to colonsocopy    Xi Acosta MD  [facility]  [facility], [address]  [place] [address]  Office: [phone]  Fax: [phone]

## 2023-07-28 NOTE — ANESTHESIA PREPROCEDURE EVALUATION
07/28/2023  Efraín Anton is a 73 y.o., male.      Pre-op Assessment    I have reviewed the Patient Summary Reports.     I have reviewed the Nursing Notes.       Review of Systems  Anesthesia Hx:  No problems with previous Anesthesia    Cardiovascular:   Hypertension    Hepatic/GI:   Hiatal Hernia,    Musculoskeletal:   Arthritis     Neurological:   Neuromuscular Disease,        Physical Exam  General: Well nourished        Anesthesia Plan  Type of Anesthesia, risks & benefits discussed:    Anesthesia Type: Gen Natural Airway  Intra-op Monitoring Plan: Standard ASA Monitors  Induction:  IV  Informed Consent: Informed consent signed with the Patient and all parties understand the risks and agree with anesthesia plan.  All questions answered.   ASA Score: 2  Day of Surgery Review of History & Physical: H&P Update referred to the surgeon/provider.    Ready For Surgery From Anesthesia Perspective.     .

## 2023-07-28 NOTE — TRANSFER OF CARE
"Anesthesia Transfer of Care Note    Patient: Efraín Anton    Procedure(s) Performed: Procedure(s) (LRB):  COLONOSCOPY (N/A)    Patient location: PACU    Anesthesia Type: general    Transport from OR: Transported from OR on room air with adequate spontaneous ventilation    Post pain: adequate analgesia    Post assessment: no apparent anesthetic complications and tolerated procedure well    Post vital signs: stable    Level of consciousness: sedated    Nausea/Vomiting: no nausea/vomiting    Complications: none    Transfer of care protocol was followed      Last vitals:   Visit Vitals  BP (!) 160/88 (BP Location: Left arm, Patient Position: Lying)   Pulse 95   Temp 36.7 °C (98.1 °F) (Temporal)   Resp 16   Ht 5' 9" (1.753 m)   Wt 89.4 kg (197 lb)   SpO2 (!) 94%   BMI 29.09 kg/m²     "

## 2023-07-31 NOTE — ANESTHESIA POSTPROCEDURE EVALUATION
Anesthesia Post Evaluation    Patient: Efraín Anton    Procedure(s) Performed: Procedure(s) (LRB):  COLONOSCOPY (N/A)    Final Anesthesia Type: general      Patient location during evaluation: PACU  Patient participation: Yes- Able to Participate  Level of consciousness: awake and alert  Post-procedure vital signs: reviewed and stable  Pain management: adequate  Airway patency: patent    PONV status at discharge: No PONV  Anesthetic complications: no      Cardiovascular status: blood pressure returned to baseline  Respiratory status: unassisted and room air  Hydration status: euvolemic  Follow-up not needed.          Vitals Value Taken Time   /60 07/28/23 1116   Temp 36.9 °C (98.4 °F) 07/28/23 1110   Pulse 72 07/28/23 1119   Resp  07/31/23 0923   SpO2 96 % 07/28/23 1119   Vitals shown include unvalidated device data.      Event Time   Out of Recovery 11:44:18         Pain/Brannon Score: No data recorded

## 2023-12-01 ENCOUNTER — TELEPHONE (OUTPATIENT)
Dept: FAMILY MEDICINE | Facility: CLINIC | Age: 73
End: 2023-12-01
Payer: MEDICARE

## 2024-01-16 ENCOUNTER — OFFICE VISIT (OUTPATIENT)
Dept: FAMILY MEDICINE | Facility: CLINIC | Age: 74
End: 2024-01-16
Payer: MEDICARE

## 2024-01-16 VITALS
HEART RATE: 83 BPM | SYSTOLIC BLOOD PRESSURE: 138 MMHG | RESPIRATION RATE: 18 BRPM | DIASTOLIC BLOOD PRESSURE: 80 MMHG | OXYGEN SATURATION: 99 % | TEMPERATURE: 98 F | HEIGHT: 69 IN | BODY MASS INDEX: 29.71 KG/M2 | WEIGHT: 200.63 LBS

## 2024-01-16 DIAGNOSIS — I10 ESSENTIAL HYPERTENSION: Primary | ICD-10-CM

## 2024-01-16 DIAGNOSIS — M15.9 PRIMARY OSTEOARTHRITIS INVOLVING MULTIPLE JOINTS: ICD-10-CM

## 2024-01-16 DIAGNOSIS — Z12.5 PROSTATE CANCER SCREENING: ICD-10-CM

## 2024-01-16 DIAGNOSIS — E78.5 HYPERLIPIDEMIA, UNSPECIFIED HYPERLIPIDEMIA TYPE: ICD-10-CM

## 2024-01-16 DIAGNOSIS — K21.00 GASTROESOPHAGEAL REFLUX DISEASE WITH ESOPHAGITIS, UNSPECIFIED WHETHER HEMORRHAGE: ICD-10-CM

## 2024-01-16 PROCEDURE — 3079F DIAST BP 80-89 MM HG: CPT | Mod: HCNC,CPTII,S$GLB, | Performed by: FAMILY MEDICINE

## 2024-01-16 PROCEDURE — 3075F SYST BP GE 130 - 139MM HG: CPT | Mod: HCNC,CPTII,S$GLB, | Performed by: FAMILY MEDICINE

## 2024-01-16 PROCEDURE — 1159F MED LIST DOCD IN RCRD: CPT | Mod: HCNC,CPTII,S$GLB, | Performed by: FAMILY MEDICINE

## 2024-01-16 PROCEDURE — 1101F PT FALLS ASSESS-DOCD LE1/YR: CPT | Mod: HCNC,CPTII,S$GLB, | Performed by: FAMILY MEDICINE

## 2024-01-16 PROCEDURE — 1160F RVW MEDS BY RX/DR IN RCRD: CPT | Mod: HCNC,CPTII,S$GLB, | Performed by: FAMILY MEDICINE

## 2024-01-16 PROCEDURE — 99214 OFFICE O/P EST MOD 30 MIN: CPT | Mod: HCNC,S$GLB,, | Performed by: FAMILY MEDICINE

## 2024-01-16 PROCEDURE — 3288F FALL RISK ASSESSMENT DOCD: CPT | Mod: HCNC,CPTII,S$GLB, | Performed by: FAMILY MEDICINE

## 2024-01-16 PROCEDURE — 1126F AMNT PAIN NOTED NONE PRSNT: CPT | Mod: HCNC,CPTII,S$GLB, | Performed by: FAMILY MEDICINE

## 2024-01-16 PROCEDURE — 99999 PR PBB SHADOW E&M-EST. PATIENT-LVL III: CPT | Mod: PBBFAC,HCNC,, | Performed by: FAMILY MEDICINE

## 2024-01-16 RX ORDER — LISINOPRIL 5 MG/1
5 TABLET ORAL DAILY
Qty: 90 TABLET | Refills: 3 | Status: SHIPPED | OUTPATIENT
Start: 2024-01-16

## 2024-01-16 RX ORDER — MELOXICAM 7.5 MG/1
TABLET ORAL
Qty: 90 TABLET | Refills: 3 | Status: SHIPPED | OUTPATIENT
Start: 2024-01-16

## 2024-01-16 NOTE — PROGRESS NOTES
Subjective:       Patient ID: Efraín Anton is a 73 y.o. male.    Chief Complaint: Follow-up (6mth f/u)    HPI  Review of Systems   Constitutional:  Negative for fatigue and unexpected weight change.   Respiratory:  Negative for chest tightness and shortness of breath.    Cardiovascular:  Negative for chest pain, palpitations and leg swelling.   Gastrointestinal:  Negative for abdominal pain.   Musculoskeletal:  Negative for arthralgias.   Neurological:  Negative for dizziness, syncope, light-headedness and headaches.       Patient Active Problem List   Diagnosis    Essential hypertension    Primary osteoarthritis of both shoulders    Eczema    Esophageal stenosis    Hiatal hernia    Gastroesophageal reflux disease with esophagitis    History of prostate cancer    Full thickness macular hole, stage 2, right    Hypertensive retinopathy, bilateral    NS (nuclear sclerosis), bilateral    Choroidal nevus, left    Nuclear sclerotic cataract, bilateral     Patient is here for a chronic conditions follow up.  Discussed recent dementia screening AWV 5/2023.  Has no concerns. Denies memory issues.       [facility] h/o prostate cancer s/p prostatectomy 2018. PSA undetectable      Eye Dr. lacey-htn retinopathy and macular hole. Terri did cataract surgery    GI Dr. Acosta colonoscopy -no polyps . No further surveillance recommended  Objective:      Physical Exam  Vitals and nursing note reviewed.   Constitutional:       Appearance: He is well-developed.   Cardiovascular:      Rate and Rhythm: Normal rate and regular rhythm.      Heart sounds: Normal heart sounds.   Pulmonary:      Effort: Pulmonary effort is normal.      Breath sounds: Normal breath sounds.   Skin:     General: Skin is warm and dry.   Neurological:      Mental Status: He is alert and oriented to person, place, and time.         Assessment:       1. Prostate cancer screening    2. Essential hypertension    3. Primary osteoarthritis involving multiple joints         Plan:         1. Essential hypertension  Controlled on current medications.  Continue current medications.    - Lipid Panel; Future  - Comprehensive Metabolic Panel; Future  - CBC Auto Differential; Future  - lisinopriL (PRINIVIL,ZESTRIL) 5 MG tablet; Take 1 tablet (5 mg total) by mouth once daily.  Dispense: 90 tablet; Refill: 3    2. Primary osteoarthritis involving multiple joints  Cont use prn  - meloxicam (MOBIC) 7.5 MG tablet; TAKE 1 TABLET ONE TIME DAILY WITH FOOD AS NEEDED FOR ARTHRITIS  Dispense: 90 tablet; Refill: 3    3. Prostate cancer screening  Discussed benefits, risks and limitations of PSA testing and current USPSTF guidelines.  Patient expressed verbal understanding and wished to pursue screening.    - PSA, Screening; Future    4. Gastroesophageal reflux disease with esophagitis, unspecified whether hemorrhage  Cont current mgmt    5. Hyperlipidemia, unspecified hyperlipidemia type  I recommend a heart healthy diet rich in fiber, fresh vegetables and fruit and low in saturated fats (fried foods, red meat, etc.).  I also recommend regular exercise including a minimum of 150 minutes of cardio exercise per week and 2-30 minute workouts of strength training like light weights, yoga, pilates, etc.  You should work toward a body mass index of < 25.    Screen and treat as indicated:        Time spent with patient: 20 minutes    Patient with be reevaluated in 6 months or sooner prn    Greater than 50% of this visit was spent counseling as described in above documentation:Yes

## 2024-01-19 ENCOUNTER — LAB VISIT (OUTPATIENT)
Dept: LAB | Facility: HOSPITAL | Age: 74
End: 2024-01-19
Attending: FAMILY MEDICINE
Payer: MEDICARE

## 2024-01-19 DIAGNOSIS — Z12.5 PROSTATE CANCER SCREENING: ICD-10-CM

## 2024-01-19 DIAGNOSIS — I10 ESSENTIAL HYPERTENSION: ICD-10-CM

## 2024-01-19 LAB
ALBUMIN SERPL BCP-MCNC: 4 G/DL (ref 3.5–5.2)
ALP SERPL-CCNC: 100 U/L (ref 55–135)
ALT SERPL W/O P-5'-P-CCNC: 19 U/L (ref 10–44)
ANION GAP SERPL CALC-SCNC: 4 MMOL/L (ref 8–16)
AST SERPL-CCNC: 15 U/L (ref 10–40)
BASOPHILS # BLD AUTO: 0.04 K/UL (ref 0–0.2)
BASOPHILS NFR BLD: 0.5 % (ref 0–1.9)
BILIRUB SERPL-MCNC: 1.1 MG/DL (ref 0.1–1)
BUN SERPL-MCNC: 20 MG/DL (ref 8–23)
CALCIUM SERPL-MCNC: 9.5 MG/DL (ref 8.7–10.5)
CHLORIDE SERPL-SCNC: 106 MMOL/L (ref 95–110)
CHOLEST SERPL-MCNC: 181 MG/DL (ref 120–199)
CHOLEST/HDLC SERPL: 3.5 {RATIO} (ref 2–5)
CO2 SERPL-SCNC: 28 MMOL/L (ref 23–29)
COMPLEXED PSA SERPL-MCNC: <0.01 NG/ML (ref 0–4)
CREAT SERPL-MCNC: 1.2 MG/DL (ref 0.5–1.4)
DIFFERENTIAL METHOD BLD: ABNORMAL
EOSINOPHIL # BLD AUTO: 0.4 K/UL (ref 0–0.5)
EOSINOPHIL NFR BLD: 5 % (ref 0–8)
ERYTHROCYTE [DISTWIDTH] IN BLOOD BY AUTOMATED COUNT: 12.9 % (ref 11.5–14.5)
EST. GFR  (NO RACE VARIABLE): >60 ML/MIN/1.73 M^2
GLUCOSE SERPL-MCNC: 91 MG/DL (ref 70–110)
HCT VFR BLD AUTO: 48.1 % (ref 40–54)
HDLC SERPL-MCNC: 52 MG/DL (ref 40–75)
HDLC SERPL: 28.7 % (ref 20–50)
HGB BLD-MCNC: 15.9 G/DL (ref 14–18)
IMM GRANULOCYTES # BLD AUTO: 0.03 K/UL (ref 0–0.04)
IMM GRANULOCYTES NFR BLD AUTO: 0.4 % (ref 0–0.5)
LDLC SERPL CALC-MCNC: 107.4 MG/DL (ref 63–159)
LYMPHOCYTES # BLD AUTO: 3.2 K/UL (ref 1–4.8)
LYMPHOCYTES NFR BLD: 38.9 % (ref 18–48)
MCH RBC QN AUTO: 31.7 PG (ref 27–31)
MCHC RBC AUTO-ENTMCNC: 33.1 G/DL (ref 32–36)
MCV RBC AUTO: 96 FL (ref 82–98)
MONOCYTES # BLD AUTO: 0.8 K/UL (ref 0.3–1)
MONOCYTES NFR BLD: 9.9 % (ref 4–15)
NEUTROPHILS # BLD AUTO: 3.7 K/UL (ref 1.8–7.7)
NEUTROPHILS NFR BLD: 45.3 % (ref 38–73)
NONHDLC SERPL-MCNC: 129 MG/DL
NRBC BLD-RTO: 0 /100 WBC
PLATELET # BLD AUTO: 261 K/UL (ref 150–450)
PMV BLD AUTO: 10.3 FL (ref 9.2–12.9)
POTASSIUM SERPL-SCNC: 5.2 MMOL/L (ref 3.5–5.1)
PROT SERPL-MCNC: 6.7 G/DL (ref 6–8.4)
RBC # BLD AUTO: 5.02 M/UL (ref 4.6–6.2)
SODIUM SERPL-SCNC: 138 MMOL/L (ref 136–145)
TRIGL SERPL-MCNC: 108 MG/DL (ref 30–150)
WBC # BLD AUTO: 8.19 K/UL (ref 3.9–12.7)

## 2024-01-19 PROCEDURE — 84153 ASSAY OF PSA TOTAL: CPT | Mod: HCNC | Performed by: FAMILY MEDICINE

## 2024-01-19 PROCEDURE — 36415 COLL VENOUS BLD VENIPUNCTURE: CPT | Mod: HCNC,PO | Performed by: FAMILY MEDICINE

## 2024-01-19 PROCEDURE — 85025 COMPLETE CBC W/AUTO DIFF WBC: CPT | Mod: HCNC | Performed by: FAMILY MEDICINE

## 2024-01-19 PROCEDURE — 80053 COMPREHEN METABOLIC PANEL: CPT | Mod: HCNC | Performed by: FAMILY MEDICINE

## 2024-01-19 PROCEDURE — 80061 LIPID PANEL: CPT | Mod: HCNC | Performed by: FAMILY MEDICINE

## 2024-02-01 DIAGNOSIS — E87.5 HYPERKALEMIA: Primary | ICD-10-CM

## 2024-02-16 ENCOUNTER — TELEPHONE (OUTPATIENT)
Dept: ADMINISTRATIVE | Facility: CLINIC | Age: 74
End: 2024-02-16
Payer: MEDICARE

## 2024-02-19 ENCOUNTER — OFFICE VISIT (OUTPATIENT)
Dept: FAMILY MEDICINE | Facility: CLINIC | Age: 74
End: 2024-02-19
Payer: MEDICARE

## 2024-02-19 ENCOUNTER — LAB VISIT (OUTPATIENT)
Dept: LAB | Facility: HOSPITAL | Age: 74
End: 2024-02-19
Attending: FAMILY MEDICINE
Payer: MEDICARE

## 2024-02-19 VITALS
WEIGHT: 196.63 LBS | DIASTOLIC BLOOD PRESSURE: 78 MMHG | OXYGEN SATURATION: 95 % | BODY MASS INDEX: 29.12 KG/M2 | TEMPERATURE: 98 F | SYSTOLIC BLOOD PRESSURE: 128 MMHG | HEIGHT: 69 IN | HEART RATE: 74 BPM

## 2024-02-19 DIAGNOSIS — I10 ESSENTIAL HYPERTENSION: ICD-10-CM

## 2024-02-19 DIAGNOSIS — Z00.00 ENCOUNTER FOR PREVENTIVE HEALTH EXAMINATION: Primary | ICD-10-CM

## 2024-02-19 DIAGNOSIS — E87.5 HYPERKALEMIA: ICD-10-CM

## 2024-02-19 DIAGNOSIS — Z85.46 HISTORY OF PROSTATE CANCER: ICD-10-CM

## 2024-02-19 LAB — POTASSIUM SERPL-SCNC: 4.4 MMOL/L (ref 3.5–5.1)

## 2024-02-19 PROCEDURE — 1170F FXNL STATUS ASSESSED: CPT | Mod: HCNC,CPTII,S$GLB, | Performed by: NURSE PRACTITIONER

## 2024-02-19 PROCEDURE — 1101F PT FALLS ASSESS-DOCD LE1/YR: CPT | Mod: HCNC,CPTII,S$GLB, | Performed by: NURSE PRACTITIONER

## 2024-02-19 PROCEDURE — G0439 PPPS, SUBSEQ VISIT: HCPCS | Mod: HCNC,S$GLB,, | Performed by: NURSE PRACTITIONER

## 2024-02-19 PROCEDURE — 3288F FALL RISK ASSESSMENT DOCD: CPT | Mod: HCNC,CPTII,S$GLB, | Performed by: NURSE PRACTITIONER

## 2024-02-19 PROCEDURE — 1159F MED LIST DOCD IN RCRD: CPT | Mod: HCNC,CPTII,S$GLB, | Performed by: NURSE PRACTITIONER

## 2024-02-19 PROCEDURE — 3078F DIAST BP <80 MM HG: CPT | Mod: HCNC,CPTII,S$GLB, | Performed by: NURSE PRACTITIONER

## 2024-02-19 PROCEDURE — 36415 COLL VENOUS BLD VENIPUNCTURE: CPT | Mod: HCNC,PO | Performed by: FAMILY MEDICINE

## 2024-02-19 PROCEDURE — 1160F RVW MEDS BY RX/DR IN RCRD: CPT | Mod: HCNC,CPTII,S$GLB, | Performed by: NURSE PRACTITIONER

## 2024-02-19 PROCEDURE — 99999 PR PBB SHADOW E&M-EST. PATIENT-LVL III: CPT | Mod: PBBFAC,HCNC,, | Performed by: NURSE PRACTITIONER

## 2024-02-19 PROCEDURE — 3074F SYST BP LT 130 MM HG: CPT | Mod: HCNC,CPTII,S$GLB, | Performed by: NURSE PRACTITIONER

## 2024-02-19 PROCEDURE — 1126F AMNT PAIN NOTED NONE PRSNT: CPT | Mod: HCNC,CPTII,S$GLB, | Performed by: NURSE PRACTITIONER

## 2024-02-19 PROCEDURE — 84132 ASSAY OF SERUM POTASSIUM: CPT | Mod: HCNC | Performed by: FAMILY MEDICINE

## 2024-02-19 NOTE — PROGRESS NOTES
"  Efraín Anton presented for a  Medicare AWV and comprehensive Health Risk Assessment today. The following components were reviewed and updated:    Medical history  Family History  Social history  Allergies and Current Medications  Health Risk Assessment  Health Maintenance  Care Team         ** See Completed Assessments for Annual Wellness Visit within the encounter summary.**         The following assessments were completed:  Living Situation  CAGE  Depression Screening  Timed Get Up and Go  Whisper Test  Cognitive Function Screening  Nutrition Screening  ADL Screening  PAQ Screening    Clock in media     Vitals:    02/19/24 0939   BP: 128/78   Pulse: 74   Temp: 97.8 °F (36.6 °C)   TempSrc: Oral   SpO2: 95%   Weight: 89.2 kg (196 lb 10.4 oz)   Height: 5' 9" (1.753 m)     Body mass index is 29.04 kg/m².  Physical Exam  Constitutional:       Appearance: He is well-developed.   HENT:      Head: Normocephalic and atraumatic.      Right Ear: Hearing normal.      Left Ear: Hearing normal.      Nose: Nose normal.   Eyes:      General: Lids are normal.      Conjunctiva/sclera: Conjunctivae normal.      Pupils: Pupils are equal, round, and reactive to light.   Cardiovascular:      Rate and Rhythm: Normal rate.   Pulmonary:      Effort: Pulmonary effort is normal.   Abdominal:      Palpations: Abdomen is soft.   Musculoskeletal:         General: Normal range of motion.      Cervical back: Normal range of motion and neck supple.   Skin:     General: Skin is warm and dry.   Neurological:      Mental Status: He is alert and oriented to person, place, and time.               Diagnoses and health risks identified today and associated recommendations/orders:    1. Encounter for preventive health examination  Discussed health maintenance guidelines appropriate for age.    Review for Opioid Screening: Patient does not have rx for Opioids.    Review for Substance Use Disorders: Patient does not use substance.      2. Essential " hypertension  Controlled, continue current medication regimen  Low salt diet  Increase physical activity  Followed by pcp      3. History of prostate cancer  Stable following prostatectomy with  no recurrence        Provided Efraín with a 5-10 year written screening schedule and personal prevention plan. Recommendations were developed using the USPSTF age appropriate recommendations. Education, counseling, and referrals were provided as needed. After Visit Summary printed and given to patient which includes a list of additional screenings\tests needed.    Follow up for One year for Annual Wellness Visit.    Ariana Cobos NP      I offered to discuss advanced care planning, including how to pick a person who would make decisions for you if you were unable to make them for yourself, called a health care power of , and what kind of decisions you might make such as use of life sustaining treatments such as ventilators and tube feeding when faced with a life limiting illness recorded on a living will that they will need to know. (How you want to be cared for as you near the end of your natural life)     X Patient is interested in learning more about how to make advanced directives.  I provided them paperwork and offered to discuss this with them.

## 2024-02-19 NOTE — PATIENT INSTRUCTIONS
Counseling and Referral of Other Preventative  (Italic type indicates deductible and co-insurance are waived)    Patient Name: Efraín Anton  Today's Date: 2/19/2024    Health Maintenance       Date Due Completion Date    COVID-19 Vaccine (1) Never done ---    RSV Vaccine (Age 60+ and Pregnant patients) (1 - 1-dose 60+ series) Never done ---    Influenza Vaccine (1) 09/01/2023 12/3/2021    Override on 11/25/2019: Done    TETANUS VACCINE 11/06/2027 11/6/2017    Lipid Panel 01/19/2029 1/19/2024        No orders of the defined types were placed in this encounter.      The following information is provided to all patients.  This information is to help you find resources for any of the problems found today that may be affecting your health:                  Living healthy guide: www.[facility].[place].gov      Understanding Diabetes: www.diabetes.org      Eating healthy: www.cdc.gov/healthyweight      [facility] home safety checklist: www.cdc.gov/steadi/patient.html      Agency on Aging: www.goea.[place].[facility]      Alcoholics anonymous (AA): www.aa.org      Physical Activity: www.loli.nih.gov/sx3asmx      Tobacco use: www.quitwithusla.org

## 2024-07-31 NOTE — ANESTHESIA POSTPROCEDURE EVALUATION
Anesthesia Post Evaluation    Patient: Efraín Anton    Procedure(s) Performed: Procedure(s) (LRB):  ESOPHAGOGASTRODUODENOSCOPY (EGD) (N/A)    Final Anesthesia Type: general  Patient location during evaluation: PACU  Patient participation: Yes- Able to Participate  Level of consciousness: awake and alert and oriented  Post-procedure vital signs: reviewed and stable  Pain management: adequate  Airway patency: patent  PONV status at discharge: No PONV  Anesthetic complications: no      Cardiovascular status: blood pressure returned to baseline and stable  Respiratory status: unassisted and spontaneous ventilation  Hydration status: euvolemic  Follow-up not needed.        Visit Vitals  BP (!) 148/90   Pulse (!) 54   Resp 19   SpO2 98%       Pain/Brannon Score: Pain Assessment Performed: Yes (10/10/2017 11:20 AM)  Presence of Pain: denies (10/10/2017 11:20 AM)  Brannon Score: 10 (10/10/2017 11:20 AM)      
No

## 2024-11-06 DIAGNOSIS — I10 ESSENTIAL HYPERTENSION: ICD-10-CM

## 2024-11-06 RX ORDER — LISINOPRIL 5 MG/1
5 TABLET ORAL
Qty: 90 TABLET | Refills: 0 | Status: SHIPPED | OUTPATIENT
Start: 2024-11-06

## 2024-11-06 NOTE — TELEPHONE ENCOUNTER
Provider Staff:  Action required for this patient    Requires appointment   Requires labs      Please see care gap opportunities below in Care Due Message.    Thanks!  [facility]     Appointments      Date Provider   Last Visit   1/16/2024 Sandi Nix MD   Next Visit   Visit date not found Sandi Nix MD     Refill Decision Note   Efraín Anton  is requesting a refill authorization.  Brief Assessment and Rationale for Refill:  Approve     Medication Therapy Plan:         Comments:     Note composed:8:37 AM 11/06/2024

## 2024-11-06 NOTE — TELEPHONE ENCOUNTER
Care Due:                  Date            Visit Type   Department     Provider  --------------------------------------------------------------------------------                                EP -                              PRIMARY      [facility]  Last Visit: 01-      CARE ([facility])   MEDICINE       Sandi Nix  Next Visit: None Scheduled  None         None Found                                                            Last  Test          Frequency    Reason                     Performed    Due Date  --------------------------------------------------------------------------------    Office Visit  12 months..  meloxicam................  01-   01-    CBC.........  12 months..  meloxicam................  01- 01-    CMP.........  12 months..  lisinopriL, meloxicam....  01- 01-    [facility] Embedded Care Due Messages. Reference number: [id number].   11/06/2024 2:36:47 AM CST

## 2025-01-18 DIAGNOSIS — I10 ESSENTIAL HYPERTENSION: ICD-10-CM

## 2025-01-18 NOTE — TELEPHONE ENCOUNTER
Care Due:                  Date            Visit Type   Department     Provider  --------------------------------------------------------------------------------                                EP -                              PRIMARY      [facility]  Last Visit: 01-      CARE ([facility])   MEDICINE       Sandi Nix  Next Visit: None Scheduled  None         None Found                                                            Last  Test          Frequency    Reason                     Performed    Due Date  --------------------------------------------------------------------------------    Office Visit  12 months..  lisinopriL, meloxicam....  01-   01-    CBC.........  12 months..  meloxicam................  01- 01-    CMP.........  12 months..  lisinopriL, meloxicam....  01- 01-    [facility] Embedded Care Due Messages. Reference number: [id number].   1/18/2025 2:00:46 AM CST

## 2025-01-19 NOTE — TELEPHONE ENCOUNTER
Refill Routing Note   Medication(s) are not appropriate for processing by [facility] for the following reason(s):        Required labs outdated    [facility] action(s):  Defer   Requires labs : Yes             Appointments  past 12m or future 3m with PCP    Date Provider   Last Visit   1/16/2024 Sandi Nix MD   Next Visit   Visit date not found Sandi Nix MD   ED visits in past 90 days: 0        Note composed:11:54 PM 01/18/2025

## 2025-01-21 RX ORDER — LISINOPRIL 5 MG/1
5 TABLET ORAL
Qty: 90 TABLET | Refills: 3 | Status: SHIPPED | OUTPATIENT
Start: 2025-01-21

## 2025-02-21 DIAGNOSIS — Z00.00 ENCOUNTER FOR MEDICARE ANNUAL WELLNESS EXAM: ICD-10-CM

## 2025-03-20 ENCOUNTER — LAB VISIT (OUTPATIENT)
Dept: LAB | Facility: HOSPITAL | Age: 75
End: 2025-03-20
Payer: MEDICARE

## 2025-03-20 ENCOUNTER — OFFICE VISIT (OUTPATIENT)
Dept: FAMILY MEDICINE | Facility: CLINIC | Age: 75
End: 2025-03-20
Payer: MEDICARE

## 2025-03-20 VITALS
WEIGHT: 202.38 LBS | SYSTOLIC BLOOD PRESSURE: 150 MMHG | OXYGEN SATURATION: 99 % | DIASTOLIC BLOOD PRESSURE: 82 MMHG | TEMPERATURE: 98 F | BODY MASS INDEX: 29.98 KG/M2 | HEIGHT: 69 IN | HEART RATE: 76 BPM

## 2025-03-20 DIAGNOSIS — I10 ESSENTIAL HYPERTENSION: Primary | ICD-10-CM

## 2025-03-20 DIAGNOSIS — Z85.46 HISTORY OF PROSTATE CANCER: ICD-10-CM

## 2025-03-20 DIAGNOSIS — M79.604 RIGHT LEG PAIN: ICD-10-CM

## 2025-03-20 DIAGNOSIS — M19.012 PRIMARY OSTEOARTHRITIS OF BOTH SHOULDERS: ICD-10-CM

## 2025-03-20 DIAGNOSIS — R07.9 CHEST PAIN, UNSPECIFIED TYPE: ICD-10-CM

## 2025-03-20 DIAGNOSIS — R79.9 ABNORMAL FINDING OF BLOOD CHEMISTRY, UNSPECIFIED: ICD-10-CM

## 2025-03-20 DIAGNOSIS — Z12.5 ENCOUNTER FOR SCREENING FOR MALIGNANT NEOPLASM OF PROSTATE: ICD-10-CM

## 2025-03-20 DIAGNOSIS — M19.011 PRIMARY OSTEOARTHRITIS OF BOTH SHOULDERS: ICD-10-CM

## 2025-03-20 DIAGNOSIS — I10 ESSENTIAL HYPERTENSION: ICD-10-CM

## 2025-03-20 DIAGNOSIS — Z13.1 DIABETES MELLITUS SCREENING: ICD-10-CM

## 2025-03-20 LAB
ALBUMIN SERPL BCP-MCNC: 3.9 G/DL (ref 3.5–5.2)
ALP SERPL-CCNC: 106 U/L (ref 40–150)
ALT SERPL W/O P-5'-P-CCNC: 15 U/L (ref 10–44)
ANION GAP SERPL CALC-SCNC: 9 MMOL/L (ref 8–16)
AST SERPL-CCNC: 19 U/L (ref 10–40)
BASOPHILS # BLD AUTO: 0.04 K/UL (ref 0–0.2)
BASOPHILS NFR BLD: 0.5 % (ref 0–1.9)
BILIRUB SERPL-MCNC: 0.9 MG/DL (ref 0.1–1)
BUN SERPL-MCNC: 25 MG/DL (ref 8–23)
CALCIUM SERPL-MCNC: 9.4 MG/DL (ref 8.7–10.5)
CHLORIDE SERPL-SCNC: 106 MMOL/L (ref 95–110)
CHOLEST SERPL-MCNC: 154 MG/DL (ref 120–199)
CHOLEST/HDLC SERPL: 3.5 {RATIO} (ref 2–5)
CO2 SERPL-SCNC: 24 MMOL/L (ref 23–29)
COMPLEXED PSA SERPL-MCNC: <0.01 NG/ML (ref 0–4)
CREAT SERPL-MCNC: 1.3 MG/DL (ref 0.5–1.4)
DIFFERENTIAL METHOD BLD: NORMAL
EOSINOPHIL # BLD AUTO: 0.3 K/UL (ref 0–0.5)
EOSINOPHIL NFR BLD: 3.2 % (ref 0–8)
ERYTHROCYTE [DISTWIDTH] IN BLOOD BY AUTOMATED COUNT: 13 % (ref 11.5–14.5)
EST. GFR  (NO RACE VARIABLE): 56.9 ML/MIN/1.73 M^2
ESTIMATED AVG GLUCOSE: 114 MG/DL (ref 68–131)
GLUCOSE SERPL-MCNC: 100 MG/DL (ref 70–110)
HBA1C MFR BLD: 5.6 % (ref 4–5.6)
HCT VFR BLD AUTO: 45.3 % (ref 40–54)
HDLC SERPL-MCNC: 44 MG/DL (ref 40–75)
HDLC SERPL: 28.6 % (ref 20–50)
HGB BLD-MCNC: 14.6 G/DL (ref 14–18)
IMM GRANULOCYTES # BLD AUTO: 0.03 K/UL (ref 0–0.04)
IMM GRANULOCYTES NFR BLD AUTO: 0.4 % (ref 0–0.5)
LDLC SERPL CALC-MCNC: 94.2 MG/DL (ref 63–159)
LYMPHOCYTES # BLD AUTO: 3.1 K/UL (ref 1–4.8)
LYMPHOCYTES NFR BLD: 40 % (ref 18–48)
MCH RBC QN AUTO: 30.7 PG (ref 27–31)
MCHC RBC AUTO-ENTMCNC: 32.2 G/DL (ref 32–36)
MCV RBC AUTO: 95 FL (ref 82–98)
MONOCYTES # BLD AUTO: 1 K/UL (ref 0.3–1)
MONOCYTES NFR BLD: 12.5 % (ref 4–15)
NEUTROPHILS # BLD AUTO: 3.4 K/UL (ref 1.8–7.7)
NEUTROPHILS NFR BLD: 43.4 % (ref 38–73)
NONHDLC SERPL-MCNC: 110 MG/DL
NRBC BLD-RTO: 0 /100 WBC
PLATELET # BLD AUTO: 242 K/UL (ref 150–450)
PMV BLD AUTO: 10.3 FL (ref 9.2–12.9)
POTASSIUM SERPL-SCNC: 5.2 MMOL/L (ref 3.5–5.1)
PROT SERPL-MCNC: 6.7 G/DL (ref 6–8.4)
RBC # BLD AUTO: 4.75 M/UL (ref 4.6–6.2)
SODIUM SERPL-SCNC: 139 MMOL/L (ref 136–145)
TRIGL SERPL-MCNC: 79 MG/DL (ref 30–150)
WBC # BLD AUTO: 7.77 K/UL (ref 3.9–12.7)

## 2025-03-20 PROCEDURE — 83036 HEMOGLOBIN GLYCOSYLATED A1C: CPT | Mod: HCNC

## 2025-03-20 PROCEDURE — 99999 PR PBB SHADOW E&M-EST. PATIENT-LVL IV: CPT | Mod: PBBFAC,HCNC,,

## 2025-03-20 PROCEDURE — 85025 COMPLETE CBC W/AUTO DIFF WBC: CPT | Mod: HCNC

## 2025-03-20 PROCEDURE — 80061 LIPID PANEL: CPT | Mod: HCNC

## 2025-03-20 PROCEDURE — 80053 COMPREHEN METABOLIC PANEL: CPT | Mod: HCNC

## 2025-03-20 PROCEDURE — 36415 COLL VENOUS BLD VENIPUNCTURE: CPT | Mod: HCNC,PO

## 2025-03-20 PROCEDURE — 84153 ASSAY OF PSA TOTAL: CPT | Mod: HCNC

## 2025-03-20 RX ORDER — LISINOPRIL 10 MG/1
10 TABLET ORAL DAILY
Qty: 90 TABLET | Refills: 3 | Status: CANCELLED | OUTPATIENT
Start: 2025-03-20 | End: 2026-03-20

## 2025-03-20 NOTE — PROGRESS NOTES
[facility]     Subjective:       Patient ID:  [id number]     Chief Complaint: Hypertension    Efraín Anton is a 74 y.o. male with a past medical history significant for HTN and history of prostate cancer s/p prostatectomy who presents to the clinic for blood pressure.     The patient presents for a blood pressure follow-up. He believes his blood pressure has been elevated but does not monitor it at home. He is currently taking lisinopril 5 mg. He previously was increased to lisinopril 10 mg but experienced significant drops in blood pressure, causing hypotensive symptoms. The patient reports that he has been following up with his son-in-law, who is a doctor. He is unsure if recent lab work has been done, but he mentions having had an ultrasound due to chronic right leg pain. He denies any chest pain or shortness of breath at this time.    The patient also reports chronic right leg pain and bilateral shoulder pain. He has not been evaluated for the shoulder pain since a traumatic injury a couple of years ago. Per chart review, a shoulder x-ray from 2017 showed significant arthritis. Regarding the right leg pain, the patient had an ultrasound done, which did not show any issues with blood flow. He experiences pain between the knee and ankle, which is exacerbated by walking at times. He has not had an XR to his knowledge.     The patient is unsure if recent blood work was done, as [facility]. He does report intermittent chest pain lasting 30 seconds to 3 minutes, not necessarily exacerbated by exertion. His son-in-law is currently working on this, considering a possible stress test. The patient plans to visit [place] this weekend and believes he will undergo the stress test during that visit.    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    Multiple rib fractures     Prostate cancer  8/20/2018    Prostate enlargement     Tinnitus of right ear 6/13/2016    Tinnitus, objective       Review of patient's allergies indicates:  No Known Allergies    Lab Results   Component Value Date    WBC 8.19 01/19/2024    HGB 15.9 01/19/2024    HCT 48.1 01/19/2024     01/19/2024    CHOL 181 01/19/2024    TRIG 108 01/19/2024    HDL 52 01/19/2024    ALT 19 01/19/2024    AST 15 01/19/2024     01/19/2024    K 4.4 02/19/2024     01/19/2024    CREATININE 1.2 01/19/2024    BUN 20 01/19/2024    CO2 28 01/19/2024    PSA <0.01 01/19/2024       Review of Systems   Constitutional:  Negative for chills and fever.   Respiratory:  Negative for cough and shortness of breath.    Cardiovascular:  Positive for chest pain.   Gastrointestinal:  Negative for abdominal pain, diarrhea, nausea and vomiting.   Musculoskeletal:  Positive for arthralgias and myalgias.         Objective:      Physical Exam  Constitutional:       General: He is not in acute distress.     Appearance: Normal appearance. He is not toxic-appearing.   HENT:      Head: Normocephalic and atraumatic.      Nose: Nose normal.   Cardiovascular:      Rate and Rhythm: Normal rate and regular rhythm.      Pulses: Normal pulses.      Heart sounds: No murmur heard.     No friction rub.   Pulmonary:      Effort: Pulmonary effort is normal. No respiratory distress.      Breath sounds: Normal breath sounds. No wheezing.   Musculoskeletal:      Right shoulder: Crepitus present. No tenderness or bony tenderness. Decreased range of motion.      Left shoulder: Crepitus present. No tenderness or bony tenderness. Decreased range of motion.      Cervical back: Normal range of motion.      Right knee: Crepitus present. No bony tenderness. Normal range of motion. No LCL laxity, MCL laxity, ACL laxity or PCL laxity.      Left knee: Crepitus present. No bony tenderness. Normal range of motion. No LCL laxity, MCL laxity, ACL laxity or PCL laxity.     Right lower leg: No  edema.      Left lower leg: No edema.      Comments: ROM of bilateral shoulder 0-150 - crepitus appreciated   Patient unable to externally rotate shoulders  ROM in bilateral knee is normal, there is crepitus appreciated. No ligamentous instability. No pain with palpation of the calf.      Skin:     General: Skin is warm and dry.   Neurological:      General: No focal deficit present.      Mental Status: He is alert and oriented to person, place, and time.   Psychiatric:         Mood and Affect: Mood normal.           Assessment:       1. Essential hypertension    2. History of prostate cancer    3. Chest pain, unspecified type    4. Diabetes mellitus screening    5. Primary osteoarthritis of both shoulders    6. Right leg pain          Plan:       Efraín was seen today for hypertension.    Diagnoses and all orders for this visit:    Essential hypertension  Comments:  Elevated in clinic. Discussed monitoring blood pressures at home. Patient to return to clinic in 4 weeks. Recommended increasing lisinopril to 10mg today. Patient would like to wait to increase until after trip as he has history of hypotensive episodes with increased dose.   Orders:  -     Comprehensive Metabolic Panel; Future  -     Hemoglobin A1C; Future  -     Lipid Panel; Future  -     CBC Auto Differential; Future    History of prostate cancer  Comments:  s/p prostatectomy. Will obtain PSA today to ensure it is undectable.  Orders:  -     PSA, Screening; Future    Chest pain, unspecified type  Comments:  EKG normal in clinic. Discussed stress test, however patient will obtain in [place]. Recommended patient to bring results to next appointment.  Discussed obtaining stress test if not completed by next appointment. Patient amendable.   Return precautions discussed with patient.   Orders:  -     IN OFFICE EKG 12-LEAD (to Muse)    Diabetes mellitus screening  -     Hemoglobin A1C; Future    Primary osteoarthritis of both shoulders  Comments:  ROM limited  on exam. Discussed repeating XR and referring to orthopedics.    Right leg pain  Comments:  Likely related to osteoarthritis of the knee. Patient states that he has US done in [place] to assess blood flow. Discussed bringing results to clinic.            Follow up in about 4 weeks (around 4/17/2025).    Pili Gould PA-C  Family Medicine Physician Assistant     Future Appointments       Date Provider Specialty Appt Notes    4/17/2025 Pili Gould PA-C Family Medicine 4 week f/u             Tests to Keep You Healthy    Last Blood Pressure <= 139/89 (3/20/2025): NO       I spent a total of 30 minutes on the day of the visit.This includes face to face time and non-face to face time preparing to see the patient (eg, review of tests), obtaining and/or reviewing separately obtained history, documenting clinical information in the electronic or other health record, independently interpreting results and communicating results to the patient/family/caregiver, or care coordinator.

## 2025-03-21 ENCOUNTER — RESULTS FOLLOW-UP (OUTPATIENT)
Dept: FAMILY MEDICINE | Facility: CLINIC | Age: 75
End: 2025-03-21

## 2025-03-21 ENCOUNTER — TELEPHONE (OUTPATIENT)
Dept: FAMILY MEDICINE | Facility: CLINIC | Age: 75
End: 2025-03-21
Payer: MEDICARE

## 2025-03-21 DIAGNOSIS — N18.31 STAGE 3A CHRONIC KIDNEY DISEASE: Primary | ICD-10-CM

## 2025-03-21 NOTE — TELEPHONE ENCOUNTER
----- Message from Pili Gould PA-C sent at 3/21/2025 10:43 AM CDT -----  Hi Mr. Anton,     I have reviewed your lab results. Your blood count, HgA1C, and liver function are all within normal limits. Your PSA is undetectable, which is the desired result. Your cholesterol is also within   normal limits. Potassium is slightly elevated at this time, but it is not acutely concerning. I recommend decreasing your intake of potassium-rich foods, and we will continue to monitor this. Kidney   function is slightly decreased, so I suggest increasing your water intake, and we will keep an eye on this to ensure there is no further decline.    Please let me know if you have any questions or concerns. I recommend following up as scheduled and we can repeat blood work at this time.     Pili Gould PA-C  ----- Message -----  From: [facility] [employer] [facility] Interface  Sent: 3/20/2025   3:25 PM CDT  To: Pili Gould PA-C

## 2025-04-17 ENCOUNTER — OFFICE VISIT (OUTPATIENT)
Dept: FAMILY MEDICINE | Facility: CLINIC | Age: 75
End: 2025-04-17
Payer: MEDICARE

## 2025-04-17 VITALS
TEMPERATURE: 98 F | OXYGEN SATURATION: 98 % | WEIGHT: 200.38 LBS | HEIGHT: 69 IN | DIASTOLIC BLOOD PRESSURE: 80 MMHG | BODY MASS INDEX: 29.68 KG/M2 | SYSTOLIC BLOOD PRESSURE: 162 MMHG | HEART RATE: 92 BPM

## 2025-04-17 DIAGNOSIS — I10 ESSENTIAL HYPERTENSION: Primary | ICD-10-CM

## 2025-04-17 PROCEDURE — 99214 OFFICE O/P EST MOD 30 MIN: CPT | Mod: HCNC,S$GLB,, | Performed by: NURSE PRACTITIONER

## 2025-04-17 PROCEDURE — 1159F MED LIST DOCD IN RCRD: CPT | Mod: HCNC,CPTII,S$GLB, | Performed by: NURSE PRACTITIONER

## 2025-04-17 PROCEDURE — 1125F AMNT PAIN NOTED PAIN PRSNT: CPT | Mod: HCNC,CPTII,S$GLB, | Performed by: NURSE PRACTITIONER

## 2025-04-17 PROCEDURE — 99999 PR PBB SHADOW E&M-EST. PATIENT-LVL IV: CPT | Mod: PBBFAC,HCNC,, | Performed by: NURSE PRACTITIONER

## 2025-04-17 PROCEDURE — 3079F DIAST BP 80-89 MM HG: CPT | Mod: HCNC,CPTII,S$GLB, | Performed by: NURSE PRACTITIONER

## 2025-04-17 PROCEDURE — 3288F FALL RISK ASSESSMENT DOCD: CPT | Mod: HCNC,CPTII,S$GLB, | Performed by: NURSE PRACTITIONER

## 2025-04-17 PROCEDURE — 3077F SYST BP >= 140 MM HG: CPT | Mod: HCNC,CPTII,S$GLB, | Performed by: NURSE PRACTITIONER

## 2025-04-17 PROCEDURE — 1101F PT FALLS ASSESS-DOCD LE1/YR: CPT | Mod: HCNC,CPTII,S$GLB, | Performed by: NURSE PRACTITIONER

## 2025-04-17 PROCEDURE — 1160F RVW MEDS BY RX/DR IN RCRD: CPT | Mod: HCNC,CPTII,S$GLB, | Performed by: NURSE PRACTITIONER

## 2025-04-17 RX ORDER — AMLODIPINE BESYLATE 2.5 MG/1
2.5 TABLET ORAL DAILY
Qty: 30 TABLET | Refills: 2 | Status: SHIPPED | OUTPATIENT
Start: 2025-04-17

## 2025-04-17 NOTE — PROGRESS NOTES
Subjective:       Patient ID: Efraín Anton is a 75 y.o. male.    Chief Complaint: Follow-up     HPI   74 y/o male patient with medical problems listed below presents for 4 weeks follow up of blood pressure. Noted blood pressure in clinic was 162/80. The patient currently takes lisinopril 5 mg daily. He states that he has a blood pressure monitor at home but has not been checking his blood pressure. He reports previously trying an increased dose of lisinopril 10 mg daily, but it caused his blood pressure to drop significantly, so the dose was reduced. He has a stress test scheduled on 4/25/2025 in [place] for evaluation of chest pain. He states has not had chest pain for past 3 days. Denies headache, dizziness, sob. He is currently staying with his daughter in [place].    Labs reviewed- K mildly elevated 5.2    Problem List[1]     Review of patient's allergies indicates:  No Known Allergies    Past Surgical History:   Procedure Laterality Date    CATARACT EXTRACTION Right 12/16/2020    rach truong     CATARACT EXTRACTION W/  INTRAOCULAR LENS IMPLANT Right 12/16/2020    Procedure: CEIOL OD;  Surgeon: Rach Truong MD;  Location: Replaced by [facility];  Service: Ophthalmology;  Laterality: Right;    COLONOSCOPY N/A 7/28/2023    Procedure: COLONOSCOPY;  Surgeon: Xi Acosta MD;  Location: [facility];  Service: Endoscopy;  Laterality: N/A;    COLONOSCOPY W/ POLYPECTOMY      PROSTATE BIOPSY      PROSTATECTOMY  2018    VITRECTOMY BY PARS PLANA APPROACH Right 12/19/2018    Procedure: VITRECTOMY, PARS PLANA APPROACH;  Surgeon: CLAUDINE Melendrez MD;  Location: [address];  Service: Ophthalmology;  Laterality: Right;  40 min      Current Medications[2]    Review of Systems   Constitutional:  Negative for chills and fever.   Respiratory:  Negative for cough and shortness of breath.    Cardiovascular:  Negative for chest pain and palpitations.   Gastrointestinal:  Negative for abdominal pain.   Musculoskeletal:   "Positive for gait problem.   Neurological:  Negative for dizziness and headaches.       Objective:   BP (!) 162/80   Pulse 92   Temp 98.1 °F (36.7 °C) (Oral)   Ht 5' 9" (1.753 m)   Wt 90.9 kg (200 lb 6.4 oz)   SpO2 98%   BMI 29.59 kg/m²         Physical Exam  Vitals reviewed.   Constitutional:       General: He is not in acute distress.     Appearance: Normal appearance.   Cardiovascular:      Rate and Rhythm: Normal rate and regular rhythm.      Pulses: Normal pulses.      Heart sounds: Normal heart sounds.   Pulmonary:      Effort: Pulmonary effort is normal.      Breath sounds: Normal breath sounds.   Chest:      Chest wall: No deformity, swelling or edema.   Neurological:      Mental Status: He is oriented to person, place, and time.         Assessment:       1. Essential hypertension        Plan:       1. Essential hypertension (Primary)  - Manually repeated BP was 162/80   - Continue with lisinopril 5 mg daily and start with low dose of amlodipine given mildly elevated potassium and hx of hypotension on increased dose of lisinopril  - amLODIPine (NORVASC) 2.5 MG tablet; Take 1 tablet (2.5 mg total) by mouth once daily.  Dispense: 30 tablet; Refill: 2   - advised to monitor blood pressure at home     Patient with be reevaluated in 4 weeks or sooner ludwin Will NP       [1]   Patient Active Problem List  Diagnosis    Essential hypertension    Primary osteoarthritis of both shoulders    Eczema    Esophageal stenosis    Hiatal hernia    Gastroesophageal reflux disease with esophagitis    History of prostate cancer    Full thickness macular hole, stage 2, right    Hypertensive retinopathy, bilateral    NS (nuclear sclerosis), bilateral    Choroidal nevus, left    Nuclear sclerotic cataract, bilateral   [2]   Current Outpatient Medications:     lisinopriL (PRINIVIL,ZESTRIL) 5 MG tablet, TAKE 1 TABLET EVERY DAY, Disp: 90 tablet, Rfl: 3    tadalafiL (CIALIS) 5 MG tablet, 5 mg., Disp: , Rfl:     amLODIPine " (NORVASC) 2.5 MG tablet, Take 1 tablet (2.5 mg total) by mouth once daily., Disp: 30 tablet, Rfl: 2    dorzolamide-timolol 2-0.5% (COSOPT) 22.3-6.8 mg/mL ophthalmic solution, Place 1 drop into the right eye every 12 (twelve) hours. (Patient not taking: Reported on 3/20/2025), Disp: 10 mL, Rfl: 1    meloxicam (MOBIC) 7.5 MG tablet, TAKE 1 TABLET ONE TIME DAILY WITH FOOD AS NEEDED FOR ARTHRITIS (Patient not taking: Reported on 4/17/2025), Disp: 90 tablet, Rfl: 3    triamcinolone acetonide 0.1% (KENALOG) 0.1 % cream, Apply topically 2 (two) times daily. for 10 days (Patient not taking: Reported on 3/20/2025), Disp: 80 g, Rfl: 0

## 2025-05-16 ENCOUNTER — LAB VISIT (OUTPATIENT)
Dept: LAB | Facility: HOSPITAL | Age: 75
End: 2025-05-16
Attending: NURSE PRACTITIONER
Payer: MEDICARE

## 2025-05-16 ENCOUNTER — OFFICE VISIT (OUTPATIENT)
Dept: FAMILY MEDICINE | Facility: CLINIC | Age: 75
End: 2025-05-16
Payer: MEDICARE

## 2025-05-16 VITALS
TEMPERATURE: 98 F | HEART RATE: 100 BPM | HEIGHT: 69 IN | OXYGEN SATURATION: 97 % | BODY MASS INDEX: 30.3 KG/M2 | WEIGHT: 204.56 LBS | SYSTOLIC BLOOD PRESSURE: 122 MMHG | DIASTOLIC BLOOD PRESSURE: 60 MMHG

## 2025-05-16 DIAGNOSIS — E78.5 HYPERLIPIDEMIA, UNSPECIFIED HYPERLIPIDEMIA TYPE: ICD-10-CM

## 2025-05-16 DIAGNOSIS — E87.5 HYPERKALEMIA: ICD-10-CM

## 2025-05-16 DIAGNOSIS — I10 ESSENTIAL HYPERTENSION: Primary | ICD-10-CM

## 2025-05-16 DIAGNOSIS — I10 ESSENTIAL HYPERTENSION: ICD-10-CM

## 2025-05-16 LAB
ABSOLUTE EOSINOPHIL (OHS): 0.2 K/UL
ABSOLUTE MONOCYTE (OHS): 0.97 K/UL (ref 0.3–1)
ABSOLUTE NEUTROPHIL COUNT (OHS): 4.75 K/UL (ref 1.8–7.7)
ALBUMIN SERPL BCP-MCNC: 3.7 G/DL (ref 3.5–5.2)
ALP SERPL-CCNC: 109 UNIT/L (ref 40–150)
ALT SERPL W/O P-5'-P-CCNC: 17 UNIT/L (ref 10–44)
ANION GAP (OHS): 8 MMOL/L (ref 8–16)
AST SERPL-CCNC: 18 UNIT/L (ref 11–45)
BASOPHILS # BLD AUTO: 0.04 K/UL
BASOPHILS NFR BLD AUTO: 0.5 %
BILIRUB SERPL-MCNC: 0.6 MG/DL (ref 0.1–1)
BUN SERPL-MCNC: 23 MG/DL (ref 8–23)
CALCIUM SERPL-MCNC: 8.7 MG/DL (ref 8.7–10.5)
CHLORIDE SERPL-SCNC: 109 MMOL/L (ref 95–110)
CHOLEST SERPL-MCNC: 162 MG/DL (ref 120–199)
CHOLEST/HDLC SERPL: 4 {RATIO} (ref 2–5)
CO2 SERPL-SCNC: 23 MMOL/L (ref 23–29)
CREAT SERPL-MCNC: 1.2 MG/DL (ref 0.5–1.4)
ERYTHROCYTE [DISTWIDTH] IN BLOOD BY AUTOMATED COUNT: 13.5 % (ref 11.5–14.5)
GFR SERPLBLD CREATININE-BSD FMLA CKD-EPI: >60 ML/MIN/1.73/M2
GLUCOSE SERPL-MCNC: 88 MG/DL (ref 70–110)
HCT VFR BLD AUTO: 43.8 % (ref 40–54)
HDLC SERPL-MCNC: 41 MG/DL (ref 40–75)
HDLC SERPL: 25.3 % (ref 20–50)
HGB BLD-MCNC: 13.9 GM/DL (ref 14–18)
IMM GRANULOCYTES # BLD AUTO: 0.04 K/UL (ref 0–0.04)
IMM GRANULOCYTES NFR BLD AUTO: 0.5 % (ref 0–0.5)
LDLC SERPL CALC-MCNC: 96.4 MG/DL (ref 63–159)
LYMPHOCYTES # BLD AUTO: 2.7 K/UL (ref 1–4.8)
MCH RBC QN AUTO: 30.5 PG (ref 27–31)
MCHC RBC AUTO-ENTMCNC: 31.7 G/DL (ref 32–36)
MCV RBC AUTO: 96 FL (ref 82–98)
NONHDLC SERPL-MCNC: 121 MG/DL
NUCLEATED RBC (/100WBC) (OHS): 0 /100 WBC
PLATELET # BLD AUTO: 226 K/UL (ref 150–450)
PMV BLD AUTO: 10.4 FL (ref 9.2–12.9)
POTASSIUM SERPL-SCNC: 4.5 MMOL/L (ref 3.5–5.1)
PROT SERPL-MCNC: 6.6 GM/DL (ref 6–8.4)
RBC # BLD AUTO: 4.56 M/UL (ref 4.6–6.2)
RELATIVE EOSINOPHIL (OHS): 2.3 %
RELATIVE LYMPHOCYTE (OHS): 31 % (ref 18–48)
RELATIVE MONOCYTE (OHS): 11.1 % (ref 4–15)
RELATIVE NEUTROPHIL (OHS): 54.6 % (ref 38–73)
SODIUM SERPL-SCNC: 140 MMOL/L (ref 136–145)
TRIGL SERPL-MCNC: 123 MG/DL (ref 30–150)
WBC # BLD AUTO: 8.7 K/UL (ref 3.9–12.7)

## 2025-05-16 PROCEDURE — 80053 COMPREHEN METABOLIC PANEL: CPT | Mod: HCNC

## 2025-05-16 PROCEDURE — 36415 COLL VENOUS BLD VENIPUNCTURE: CPT | Mod: HCNC,PO

## 2025-05-16 PROCEDURE — 80061 LIPID PANEL: CPT | Mod: HCNC

## 2025-05-16 PROCEDURE — 85025 COMPLETE CBC W/AUTO DIFF WBC: CPT | Mod: HCNC

## 2025-05-16 PROCEDURE — 99999 PR PBB SHADOW E&M-EST. PATIENT-LVL IV: CPT | Mod: PBBFAC,HCNC,, | Performed by: NURSE PRACTITIONER

## 2025-05-16 RX ORDER — AMLODIPINE BESYLATE 2.5 MG/1
2.5 TABLET ORAL DAILY
Qty: 30 TABLET | Refills: 2 | Status: SHIPPED | OUTPATIENT
Start: 2025-05-16

## 2025-05-16 RX ORDER — AMLODIPINE BESYLATE 2.5 MG/1
2.5 TABLET ORAL DAILY
Qty: 30 TABLET | Refills: 2 | Status: SHIPPED | OUTPATIENT
Start: 2025-05-16 | End: 2025-05-16 | Stop reason: SDUPTHER

## 2025-05-16 RX ORDER — AMLODIPINE BESYLATE 2.5 MG/1
2.5 TABLET ORAL DAILY
Qty: 30 TABLET | Refills: 2 | Status: CANCELLED | OUTPATIENT
Start: 2025-05-16

## 2025-05-16 NOTE — PROGRESS NOTES
Subjective:       Patient ID: Efraín Anton is a 75 y.o. male.    Chief Complaint: Hypertension     HPI   76 y/o male patient with medical problems listed below presents for one month follow up of HTN. He currently takes lisinopril 5 mg daily and amlodipine 2.5 mg daily. He reports issues with his blood pressure machine and has been unable to check his blood pressure recently, but states he has been feeling well overall. He reports having a stress test in April 2025 in [place] for evaluation of chest pain. His son-in-law, who is a doctor, lives in [place], and he frequently visits to spend time with his daughter. He states he has not experienced chest pain for the past few months.    Labs reviewed from 3/2025- K 5.2    Problem List[1]     Review of patient's allergies indicates:  No Known Allergies    Past Surgical History:   Procedure Laterality Date    CATARACT EXTRACTION Right 12/16/2020    rach truong     CATARACT EXTRACTION W/  INTRAOCULAR LENS IMPLANT Right 12/16/2020    Procedure: CEIOL OD;  Surgeon: Rach Truong MD;  Location: [facility] OR;  Service: Ophthalmology;  Laterality: Right;    COLONOSCOPY N/A 7/28/2023    Procedure: COLONOSCOPY;  Surgeon: Xi Acosta MD;  Location: [facility];  Service: Endoscopy;  Laterality: N/A;    COLONOSCOPY W/ POLYPECTOMY      PROSTATE BIOPSY      PROSTATECTOMY  2018    VITRECTOMY BY PARS PLANA APPROACH Right 12/19/2018    Procedure: VITRECTOMY, PARS PLANA APPROACH;  Surgeon: CLAUDINE Melendrez MD;  Location: [address];  Service: Ophthalmology;  Laterality: Right;  40 min      Current Medications[2]    Lab Results   Component Value Date    WBC 7.77 03/20/2025    HGB 14.6 03/20/2025    HCT 45.3 03/20/2025     03/20/2025    CHOL 154 03/20/2025    TRIG 79 03/20/2025    HDL 44 03/20/2025    ALT 15 03/20/2025    AST 19 03/20/2025     03/20/2025    K 5.2 (H) 03/20/2025     03/20/2025    CREATININE 1.3 03/20/2025    BUN 25 (H) 03/20/2025     "CO2 24 03/20/2025    PSA <0.01 03/20/2025    HGBA1C 5.6 03/20/2025     Review of Systems   Constitutional:  Negative for chills and fever.   Respiratory:  Negative for shortness of breath.    Cardiovascular:  Negative for chest pain and palpitations.   Gastrointestinal:  Negative for abdominal pain.   Neurological:  Negative for dizziness and headaches.       Objective:   /60 (BP Location: Left arm, Patient Position: Sitting)   Pulse 100   Temp 97.9 °F (36.6 °C) (Oral)   Ht 5' 9" (1.753 m)   Wt 92.8 kg (204 lb 9.4 oz)   SpO2 97%   BMI 30.21 kg/m²         Physical Exam  Vitals reviewed.   Constitutional:       General: He is not in acute distress.     Appearance: Normal appearance.   Cardiovascular:      Rate and Rhythm: Normal rate and regular rhythm.      Pulses: Normal pulses.      Heart sounds: Normal heart sounds.   Pulmonary:      Effort: Pulmonary effort is normal.      Breath sounds: Normal breath sounds.   Chest:      Chest wall: No deformity, swelling or edema.   Abdominal:      General: Abdomen is flat. Bowel sounds are normal.      Palpations: Abdomen is soft.   Neurological:      Mental Status: He is alert.         Assessment:       1. Essential hypertension    2. Hyperkalemia    3. Hyperlipidemia, unspecified hyperlipidemia type        Plan:       1. Essential hypertension (Primary)  - Controlled   - Continue with current regimen amlodipine lisinopril   - CBC Auto Differential; Future  - Comprehensive Metabolic Panel; Future  - amLODIPine (NORVASC) 2.5 MG tablet; Take 1 tablet (2.5 mg total) by mouth once daily.  Dispense: 30 tablet; Refill: 2    2. Hyperkalemia  - Basic Metabolic Panel; Future    3. Hyperlipidemia, unspecified hyperlipidemia type  - Lipid Panel; Future     Patient with be reevaluated in as scheduled or sooner ludwin Will NP       [1]   Patient Active Problem List  Diagnosis    Essential hypertension    Primary osteoarthritis of both shoulders    Eczema    Esophageal stenosis "    Hiatal hernia    Gastroesophageal reflux disease with esophagitis    History of prostate cancer    Full thickness macular hole, stage 2, right    Hypertensive retinopathy, bilateral    NS (nuclear sclerosis), bilateral    Choroidal nevus, left    Nuclear sclerotic cataract, bilateral   [2]   Current Outpatient Medications:     lisinopriL (PRINIVIL,ZESTRIL) 5 MG tablet, TAKE 1 TABLET EVERY DAY, Disp: 90 tablet, Rfl: 3    tadalafiL (CIALIS) 5 MG tablet, 5 mg., Disp: , Rfl:     amLODIPine (NORVASC) 2.5 MG tablet, Take 1 tablet (2.5 mg total) by mouth once daily., Disp: 30 tablet, Rfl: 2    dorzolamide-timolol 2-0.5% (COSOPT) 22.3-6.8 mg/mL ophthalmic solution, Place 1 drop into the right eye every 12 (twelve) hours. (Patient not taking: Reported on 2/19/2024), Disp: 10 mL, Rfl: 1    meloxicam (MOBIC) 7.5 MG tablet, TAKE 1 TABLET ONE TIME DAILY WITH FOOD AS NEEDED FOR ARTHRITIS (Patient not taking: Reported on 3/20/2025), Disp: 90 tablet, Rfl: 3    triamcinolone acetonide 0.1% (KENALOG) 0.1 % cream, Apply topically 2 (two) times daily. for 10 days (Patient not taking: Reported on 5/16/2025), Disp: 80 g, Rfl: 0

## 2025-05-18 ENCOUNTER — RESULTS FOLLOW-UP (OUTPATIENT)
Dept: FAMILY MEDICINE | Facility: CLINIC | Age: 75
End: 2025-05-18

## 2025-06-25 ENCOUNTER — HOSPITAL ENCOUNTER (INPATIENT)
Facility: HOSPITAL | Age: 75
LOS: 1 days | Discharge: HOME | DRG: 066 | End: 2025-06-26
Attending: EMERGENCY MEDICINE | Admitting: STUDENT IN AN ORGANIZED HEALTH CARE EDUCATION/TRAINING PROGRAM
Payer: MEDICARE

## 2025-06-25 DIAGNOSIS — I10 ESSENTIAL HYPERTENSION: ICD-10-CM

## 2025-06-25 DIAGNOSIS — I63.81 THALAMIC INFARCTION: Primary | ICD-10-CM

## 2025-06-25 DIAGNOSIS — I63.81: ICD-10-CM

## 2025-06-25 DIAGNOSIS — M15.0 PRIMARY OSTEOARTHRITIS INVOLVING MULTIPLE JOINTS: ICD-10-CM

## 2025-06-25 DIAGNOSIS — I63.9 CVA (CEREBRAL VASCULAR ACCIDENT): ICD-10-CM

## 2025-06-25 DIAGNOSIS — R29.818 ACUTE FOCAL NEUROLOGICAL DEFICIT: ICD-10-CM

## 2025-06-25 DIAGNOSIS — R73.03 PREDIABETES: ICD-10-CM

## 2025-06-25 LAB
ABSOLUTE EOSINOPHIL (SMH): 0.17 K/UL
ABSOLUTE MONOCYTE (SMH): 0.7 K/UL (ref 0.3–1)
ABSOLUTE NEUTROPHIL COUNT (SMH): 3.5 K/UL (ref 1.8–7.7)
ALBUMIN SERPL-MCNC: 3.7 G/DL (ref 3.5–5.2)
ALP SERPL-CCNC: 91 UNIT/L (ref 40–150)
ALT SERPL-CCNC: 16 UNIT/L (ref 10–44)
ANION GAP (SMH): 9 MMOL/L (ref 8–16)
AORTIC ROOT ANNULUS: 3.4 CM
AORTIC SIZE INDEX: 1.7 CM/M2
AORTIC VALVE CUSP SEPERATION: 2.05 CM
APICAL FOUR CHAMBER EJECTION FRACTION: 58 %
APICAL TWO CHAMBER EJECTION FRACTION: 59 %
ASCENDING AORTA: 3.5 CM
AST SERPL-CCNC: 20 UNIT/L (ref 11–45)
AV INDEX (PROSTH): 1.03
AV MEAN GRADIENT: 2 MMHG
AV PEAK GRADIENT: 4 MMHG
AV REGURGITATION PRESSURE HALF TIME: 836 MS
AV VALVE AREA BY VELOCITY RATIO: 3.1 CM²
AV VALVE AREA: 3.2 CM²
AV VELOCITY RATIO: 1
BASOPHILS # BLD AUTO: 0.03 K/UL
BASOPHILS NFR BLD AUTO: 0.4 %
BILIRUB SERPL-MCNC: 0.9 MG/DL (ref 0.1–1)
BSA FOR ECHO PROCEDURE: 2.12 M2
BUN SERPL-MCNC: 16 MG/DL (ref 8–23)
CALCIUM SERPL-MCNC: 8.8 MG/DL (ref 8.7–10.5)
CHLORIDE SERPL-SCNC: 107 MMOL/L (ref 95–110)
CHOLEST SERPL-MCNC: 153 MG/DL (ref 120–199)
CHOLEST/HDLC SERPL: 3.4 {RATIO} (ref 2–5)
CO2 SERPL-SCNC: 23 MMOL/L (ref 23–29)
CREAT SERPL-MCNC: 1.2 MG/DL (ref 0.5–1.4)
CV ECHO LV RWT: 0.42 CM
DOP CALC AO PEAK VEL: 1 M/S
DOP CALC AO VTI: 23.2 CM
DOP CALC LVOT AREA: 3.1 CM2
DOP CALC LVOT DIAMETER: 2 CM
DOP CALC LVOT PEAK VEL: 1 M/S
DOP CALC LVOT STROKE VOLUME: 74.7 CM3
DOP CALC MV VTI: 24.8 CM
DOP CALCLVOT PEAK VEL VTI: 23.8 CM
E WAVE DECELERATION TIME: 238 MSEC
E/A RATIO: 0.74
E/E' RATIO: 7 M/S
EAG (SMH): 117 MG/DL (ref 68–131)
ECHO LV POSTERIOR WALL: 1 CM (ref 0.6–1.1)
ERYTHROCYTE [DISTWIDTH] IN BLOOD BY AUTOMATED COUNT: 13.3 % (ref 11.5–14.5)
FRACTIONAL SHORTENING: 29.2 % (ref 28–44)
GFR SERPLBLD CREATININE-BSD FMLA CKD-EPI: >60 ML/MIN/1.73/M2
GLUCOSE SERPL-MCNC: 94 MG/DL (ref 70–110)
HBA1C MFR BLD: 5.7 % (ref 4–5.6)
HCT VFR BLD AUTO: 38.6 % (ref 40–54)
HCV AB SERPL QL IA: NORMAL
HDLC SERPL-MCNC: 45 MG/DL (ref 40–75)
HDLC SERPL: 29.4 % (ref 20–50)
HGB BLD-MCNC: 13.1 GM/DL (ref 14–18)
HIV 1+2 AB+HIV1 P24 AG SERPL QL IA: NORMAL
HOLD SPECIMEN: NORMAL
IMM GRANULOCYTES # BLD AUTO: 0.02 K/UL (ref 0–0.04)
IMM GRANULOCYTES NFR BLD AUTO: 0.3 % (ref 0–0.5)
INR PPP: 1 (ref 0.8–1.2)
INTERVENTRICULAR SEPTUM: 0.8 CM (ref 0.6–1.1)
IVRT: 141 MSEC
LA MAJOR: 5.1 CM
LDLC SERPL CALC-MCNC: 92 MG/DL (ref 63–159)
LEFT ATRIUM AREA SYSTOLIC (APICAL 2 CHAMBER): 22.11 CM2
LEFT ATRIUM AREA SYSTOLIC (APICAL 4 CHAMBER): 19.95 CM2
LEFT ATRIUM VOLUME INDEX MOD: 31 ML/M2
LEFT ATRIUM VOLUME MOD: 64 ML
LEFT INTERNAL DIMENSION IN SYSTOLE: 3.4 CM (ref 2.1–4)
LEFT VENTRICLE DIASTOLIC VOLUME INDEX: 51.92 ML/M2
LEFT VENTRICLE DIASTOLIC VOLUME: 108 ML
LEFT VENTRICLE END DIASTOLIC VOLUME APICAL 2 CHAMBER: 72.38 ML
LEFT VENTRICLE END DIASTOLIC VOLUME APICAL 4 CHAMBER: 65.3 ML
LEFT VENTRICLE END SYSTOLIC VOLUME APICAL 2 CHAMBER: 66.44 ML
LEFT VENTRICLE END SYSTOLIC VOLUME APICAL 4 CHAMBER: 60.24 ML
LEFT VENTRICLE MASS INDEX: 71.1 G/M2
LEFT VENTRICLE SYSTOLIC VOLUME INDEX: 23.1 ML/M2
LEFT VENTRICLE SYSTOLIC VOLUME: 48 ML
LEFT VENTRICULAR INTERNAL DIMENSION IN DIASTOLE: 4.8 CM (ref 3.5–6)
LEFT VENTRICULAR MASS: 147.8 G
LV LATERAL E/E' RATIO: 6.3 M/S
LV SEPTAL E/E' RATIO: 8.3 M/S
LVED V (TEICH): 108.04 ML
LVES V (TEICH): 48.38 ML
LVOT MG: 2.19 MMHG
LVOT MV: 0.67 CM/S
LYMPHOCYTES # BLD AUTO: 2.46 K/UL (ref 1–4.8)
MCH RBC QN AUTO: 31.3 PG (ref 27–31)
MCHC RBC AUTO-ENTMCNC: 33.9 G/DL (ref 32–36)
MCV RBC AUTO: 92 FL (ref 82–98)
MV MEAN GRADIENT: 1 MMHG
MV PEAK A VEL: 0.68 M/S
MV PEAK E VEL: 0.5 M/S
MV PEAK GRADIENT: 4 MMHG
MV STENOSIS PRESSURE HALF TIME: 62.68 MS
MV VALVE AREA BY CONTINUITY EQUATION: 3.01 CM2
MV VALVE AREA P 1/2 METHOD: 3.51 CM2
NONHDLC SERPL-MCNC: 108 MG/DL
NUCLEATED RBC (/100WBC) (SMH): 0 /100 WBC
OHS CV RV/LV RATIO: 0.73 CM
OHS LV EJECTION FRACTION SIMPSONS BIPLANE MOD: 59 %
OHS QRS DURATION: 86 MS
OHS QTC CALCULATION: 420 MS
PISA AR MAX VEL: 4.07 M/S
PISA MRMAX VEL: 4.82 M/S
PISA TR MAX VEL: 2.4 M/S
PLATELET # BLD AUTO: 193 K/UL (ref 150–450)
PMV BLD AUTO: 9.7 FL (ref 9.2–12.9)
POTASSIUM SERPL-SCNC: 4.2 MMOL/L (ref 3.5–5.1)
PROT SERPL-MCNC: 6.3 GM/DL (ref 6–8.4)
PROTHROMBIN TIME: 11.3 SECONDS (ref 9–12.5)
PV MV: 0.59 M/S
PV PEAK GRADIENT: 3 MMHG
PV PEAK VELOCITY: 0.88 M/S
RA MAJOR: 4.58 CM
RA PRESSURE ESTIMATED: 3 MMHG
RBC # BLD AUTO: 4.18 M/UL (ref 4.6–6.2)
RELATIVE EOSINOPHIL (SMH): 2.5 % (ref 0–8)
RELATIVE LYMPHOCYTE (SMH): 35.6 % (ref 18–48)
RELATIVE MONOCYTE (SMH): 10.1 % (ref 4–15)
RELATIVE NEUTROPHIL (SMH): 51.1 % (ref 38–73)
RIGHT VENTRICLE DIASTOLIC BASEL DIMENSION: 3.5 CM
RIGHT VENTRICLE DIASTOLIC LENGTH: 6.7 CM
RIGHT VENTRICULAR END-DIASTOLIC DIMENSION: 3.54 CM
RIGHT VENTRICULAR LENGTH IN DIASTOLE (APICAL 4-CHAMBER VIEW): 6.68 CM
RV TB RVSP: 5 MMHG
RV TISSUE DOPPLER FREE WALL SYSTOLIC VELOCITY 1 (APICAL 4 CHAMBER VIEW): 9.56 CM/S
SODIUM SERPL-SCNC: 139 MMOL/L (ref 136–145)
STJ: 3.7 CM
TDI LATERAL: 0.08 M/S
TDI SEPTAL: 0.06 M/S
TDI: 0.07 M/S
TR MAX PG: 24 MMHG
TRICUSPID ANNULAR PLANE SYSTOLIC EXCURSION: 2.6 CM
TRIGL SERPL-MCNC: 80 MG/DL (ref 30–150)
TSH SERPL-ACNC: 2.44 UIU/ML (ref 0.4–4)
TV REST PULMONARY ARTERY PRESSURE: 26 MMHG
WBC # BLD AUTO: 6.91 K/UL (ref 3.9–12.7)
Z-SCORE OF LEFT VENTRICULAR DIMENSION IN END DIASTOLE: -2.83
Z-SCORE OF LEFT VENTRICULAR DIMENSION IN END SYSTOLE: -1.08

## 2025-06-25 PROCEDURE — 25500020 PHARM REV CODE 255

## 2025-06-25 PROCEDURE — 86803 HEPATITIS C AB TEST: CPT | Performed by: EMERGENCY MEDICINE

## 2025-06-25 PROCEDURE — 87389 HIV-1 AG W/HIV-1&-2 AB AG IA: CPT | Performed by: EMERGENCY MEDICINE

## 2025-06-25 PROCEDURE — 11000001 HC ACUTE MED/SURG PRIVATE ROOM

## 2025-06-25 PROCEDURE — 85610 PROTHROMBIN TIME: CPT | Performed by: EMERGENCY MEDICINE

## 2025-06-25 PROCEDURE — 99285 EMERGENCY DEPT VISIT HI MDM: CPT | Mod: 25

## 2025-06-25 PROCEDURE — 80053 COMPREHEN METABOLIC PANEL: CPT | Performed by: EMERGENCY MEDICINE

## 2025-06-25 PROCEDURE — 93005 ELECTROCARDIOGRAM TRACING: CPT

## 2025-06-25 PROCEDURE — 83036 HEMOGLOBIN GLYCOSYLATED A1C: CPT

## 2025-06-25 PROCEDURE — 25000003 PHARM REV CODE 250

## 2025-06-25 PROCEDURE — 94799 UNLISTED PULMONARY SVC/PX: CPT

## 2025-06-25 PROCEDURE — 85025 COMPLETE CBC W/AUTO DIFF WBC: CPT | Performed by: EMERGENCY MEDICINE

## 2025-06-25 PROCEDURE — 84443 ASSAY THYROID STIM HORMONE: CPT | Performed by: EMERGENCY MEDICINE

## 2025-06-25 PROCEDURE — 80061 LIPID PANEL: CPT | Performed by: EMERGENCY MEDICINE

## 2025-06-25 PROCEDURE — 99900035 HC TECH TIME PER 15 MIN (STAT)

## 2025-06-25 PROCEDURE — 36415 COLL VENOUS BLD VENIPUNCTURE: CPT | Performed by: EMERGENCY MEDICINE

## 2025-06-25 PROCEDURE — G0427 INPT/ED TELECONSULT70: HCPCS | Mod: 95,,, | Performed by: NURSE PRACTITIONER

## 2025-06-25 PROCEDURE — 93010 ELECTROCARDIOGRAM REPORT: CPT | Mod: ,,, | Performed by: INTERNAL MEDICINE

## 2025-06-25 PROCEDURE — 94760 N-INVAS EAR/PLS OXIMETRY 1: CPT

## 2025-06-25 PROCEDURE — 25000003 PHARM REV CODE 250: Performed by: EMERGENCY MEDICINE

## 2025-06-25 RX ORDER — FAMOTIDINE 20 MG/1
20 TABLET, FILM COATED ORAL 2 TIMES DAILY
Status: DISCONTINUED | OUTPATIENT
Start: 2025-06-25 | End: 2025-06-25

## 2025-06-25 RX ORDER — ATORVASTATIN CALCIUM 40 MG/1
40 TABLET, FILM COATED ORAL DAILY
Status: DISCONTINUED | OUTPATIENT
Start: 2025-06-25 | End: 2025-06-26 | Stop reason: HOSPADM

## 2025-06-25 RX ORDER — ASPIRIN 81 MG/1
81 TABLET ORAL DAILY
Status: DISCONTINUED | OUTPATIENT
Start: 2025-06-26 | End: 2025-06-26 | Stop reason: HOSPADM

## 2025-06-25 RX ORDER — LABETALOL HYDROCHLORIDE 5 MG/ML
10 INJECTION, SOLUTION INTRAVENOUS
Status: DISCONTINUED | OUTPATIENT
Start: 2025-06-25 | End: 2025-06-26 | Stop reason: HOSPADM

## 2025-06-25 RX ORDER — BISACODYL 10 MG/1
10 SUPPOSITORY RECTAL DAILY PRN
Status: DISCONTINUED | OUTPATIENT
Start: 2025-06-25 | End: 2025-06-26 | Stop reason: HOSPADM

## 2025-06-25 RX ORDER — ASPIRIN 325 MG
325 TABLET, DELAYED RELEASE (ENTERIC COATED) ORAL
Status: COMPLETED | OUTPATIENT
Start: 2025-06-25 | End: 2025-06-25

## 2025-06-25 RX ORDER — OMEPRAZOLE 20 MG/1
20 CAPSULE, DELAYED RELEASE ORAL DAILY
COMMUNITY
Start: 2024-11-11

## 2025-06-25 RX ORDER — CLOPIDOGREL BISULFATE 75 MG/1
75 TABLET ORAL DAILY
Status: DISCONTINUED | OUTPATIENT
Start: 2025-06-26 | End: 2025-06-26 | Stop reason: HOSPADM

## 2025-06-25 RX ORDER — SODIUM CHLORIDE 0.9 % (FLUSH) 0.9 %
10 SYRINGE (ML) INJECTION
Status: DISCONTINUED | OUTPATIENT
Start: 2025-06-25 | End: 2025-06-26 | Stop reason: HOSPADM

## 2025-06-25 RX ORDER — ENOXAPARIN SODIUM 100 MG/ML
40 INJECTION SUBCUTANEOUS EVERY 24 HOURS
Status: DISCONTINUED | OUTPATIENT
Start: 2025-06-26 | End: 2025-06-26 | Stop reason: HOSPADM

## 2025-06-25 RX ORDER — FAMOTIDINE 20 MG/1
20 TABLET, FILM COATED ORAL DAILY
Status: DISCONTINUED | OUTPATIENT
Start: 2025-06-25 | End: 2025-06-26 | Stop reason: HOSPADM

## 2025-06-25 RX ADMIN — ASPIRIN 325 MG: 325 TABLET, COATED ORAL at 01:06

## 2025-06-25 RX ADMIN — IOHEXOL 75 ML: 350 INJECTION, SOLUTION INTRAVENOUS at 09:06

## 2025-06-25 RX ADMIN — ATORVASTATIN CALCIUM 40 MG: 40 TABLET, FILM COATED ORAL at 02:06

## 2025-06-25 NOTE — ED PROVIDER NOTES
"Encounter Date: 6/25/2025       History     Chief Complaint   Patient presents with    Tingling     Patient states he woke up 2 days ago and has tingling in the left side of his face and left arm, states he "doesn't feel right" states he was supposed to go to [place] and doesn't feel he could make the drive due to weakness      HPI patient is a 75-year-old man presenting for evaluation of numbness to the left side of his face and left hand that began 2 days ago.  He has had associated feeling of generalized weakness but no focal weakness.  Denies any vision changes or difficulty speaking.  No chest pain or shortness of breath.  Review of patient's allergies indicates:  No Known Allergies  Past Medical History:   Diagnosis Date    Arthritis     CVA (cerebral vascular accident) 6/25/2025    Eczema     Hypertension     Lamellar macular hole of right eye 12/2018    Multiple rib fractures     Prostate cancer 8/20/2018    Prostate enlargement     Tinnitus of right ear 6/13/2016    Tinnitus, objective      Past Surgical History:   Procedure Laterality Date    CATARACT EXTRACTION Right 12/16/2020    zaid murray     CATARACT EXTRACTION W/  INTRAOCULAR LENS IMPLANT Right 12/16/2020    Procedure: CEIOL OD;  Surgeon: Zaid Murray MD;  Location: [facility] OR;  Service: Ophthalmology;  Laterality: Right;    COLONOSCOPY N/A 7/28/2023    Procedure: COLONOSCOPY;  Surgeon: Xi Acosta MD;  Location: [facility];  Service: Endoscopy;  Laterality: N/A;    COLONOSCOPY W/ POLYPECTOMY      PROSTATE BIOPSY      PROSTATECTOMY  2018    VITRECTOMY BY PARS PLANA APPROACH Right 12/19/2018    Procedure: VITRECTOMY, PARS PLANA APPROACH;  Surgeon: CLAUDINE Melendrez MD;  Location: [address];  Service: Ophthalmology;  Laterality: Right;  40 min     Family History   Problem Relation Name Age of Onset    Diabetes Mother      Heart disease Father  82        acute MI    Stroke Sister      Hypertension Sister      Hypertension Sister      No " Known Problems Sister      No Known Problems Daughter      No Known Problems Daughter      Glaucoma Neg Hx      Macular degeneration Neg Hx      Retinal detachment Neg Hx       Social History[1]  Review of Systems   Constitutional:  Positive for fatigue. Negative for fever.   HENT:  Negative for sore throat.    Respiratory:  Negative for shortness of breath.    Cardiovascular:  Negative for chest pain.   Gastrointestinal:  Negative for nausea.   Genitourinary:  Negative for dysuria.   Musculoskeletal:  Negative for back pain.   Skin:  Negative for rash.   Neurological:  Positive for weakness and numbness.   Hematological:  Does not bruise/bleed easily.       Physical Exam     Initial Vitals [06/25/25 0656]   BP Pulse Resp Temp SpO2   (!) 172/95 88 20 98 °F (36.7 °C) 97 %      MAP       --         Physical Exam    Constitutional: Vital signs are normal. He appears well-developed and well-nourished.  Non-toxic appearance. No distress.   HENT:   Head: Normocephalic and atraumatic.   Eyes: EOM are normal. Pupils are equal, round, and reactive to light.   Neck: Neck supple. No JVD present.   Normal range of motion.  Cardiovascular:  Normal rate, regular rhythm, normal heart sounds and intact distal pulses.     Exam reveals no gallop and no friction rub.       No murmur heard.  Pulmonary/Chest: Breath sounds normal. He has no wheezes. He has no rhonchi. He has no rales.   Abdominal: Abdomen is soft. Bowel sounds are normal. There is no abdominal tenderness. There is no rebound and no guarding.   Musculoskeletal:         General: Normal range of motion.      Cervical back: Normal range of motion and neck supple. No rigidity.     Neurological: He is alert and oriented to person, place, and time. He has normal strength and normal reflexes. No cranial nerve deficit. He exhibits normal muscle tone. Coordination normal. GCS score is 15. GCS eye subscore is 4. GCS verbal subscore is 5. GCS motor subscore is 6.   Patient  complains of tingling/numbness to the left side of his lower face, around his lips and volar aspect of his his left hand but has symmetrical sensation on exam.   Skin: Skin is warm and dry.   Psychiatric: He has a normal mood and affect. His speech is normal and behavior is normal. He is not actively hallucinating.         ED Course   Procedures  Labs Reviewed   CBC WITH DIFFERENTIAL - Abnormal       Result Value    WBC 6.91      RBC 4.18 (*)     Hgb 13.1 (*)     Hct 38.6 (*)     MCV 92      MCH 31.3 (*)     MCHC 33.9      RDW 13.3      Platelet Count 193      MPV 9.7      Nucleated RBC 0      Neut % 51.1      Lymph % 35.6      Mono % 10.1      Eos % 2.5      Basophil % 0.4      Imm Grans % 0.3      Neut # 3.5      Lymph # 2.46      Mono # 0.70      Eos # 0.17      Baso # 0.03      Imm Grans # 0.02     HEMOGLOBIN A1C - Abnormal    Hemoglobin A1c 5.7 (*)     Estimated Average Glucose 117     HEPATITIS C ANTIBODY - Normal    Hep C Ab Interp Non-Reactive     HIV 1 / 2 ANTIBODY - Normal    HIV 1/2 Ag/Ab Non-Reactive     COMPREHENSIVE METABOLIC PANEL - Normal    Sodium 139      Potassium 4.2      Chloride 107      CO2 23      Glucose 94      BUN 16      Creatinine 1.2      Calcium 8.8      Protein Total 6.3      Albumin 3.7      Bilirubin Total 0.9      ALP 91      AST 20      ALT 16      Anion Gap 9      eGFR >60     PROTIME-INR - Normal    PT 11.3      INR 1.0     TSH - Normal    TSH 2.437     CBC W/ AUTO DIFFERENTIAL    Narrative:     The following orders were created for panel order CBC W/ AUTO DIFFERENTIAL.  Procedure                               Abnormality         Status                     ---------                               -----------         ------                     CBC with Differential[[id number]]       Abnormal            Final result                 Please view results for these tests on the individual orders.   LIPID PANEL    Cholesterol Total 153      Triglyceride 80      HDL Cholesterol 45       LDL Cholesterol 92.0      HDL/Cholesterol Ratio 29.4      Cholesterol/HDL Ratio 3.4      Non HDL Cholesterol 108     EXTRA TUBES    Narrative:     The following orders were created for panel order EXTRA TUBES.  Procedure                               Abnormality         Status                     ---------                               -----------         ------                     Gold Top Hold[[id number]]                                   Final result                 Please view results for these tests on the individual orders.   GOLD TOP HOLD    Extra Tube Hold for add-ons.     HEP C VIRUS HOLD SPECIMEN   HIV VIRUS CONFIRMATION HOLD SPECIMEN     EKG Readings: (Independently Interpreted)   Rhythm: Normal Sinus Rhythm. Heart Rate: 76. Ectopy: No Ectopy. Conduction: Normal. ST Segments: Normal ST Segments. T Waves: Normal. Clinical Impression: Normal Sinus Rhythm       Imaging Results               MRI Brain Without Contrast (Final result)  Result time 06/25/25 13:26:21      Final result by Steven Weiner MD (06/25/25 13:26:21)                   Impression:      1.  Acute lacunar type infarct within the lateral aspect of the right thalamus.  No associated hemorrhage or mass effect.    This report was flagged in Epic as abnormal.      Electronically signed by: Steven Weiner  Date:    06/25/2025  Time:    13:26               Narrative:    EXAMINATION:  MRI BRAIN WITHOUT CONTRAST    CLINICAL HISTORY:  Neuro deficit, acute, stroke suspected;.    TECHNIQUE:  Multiplanar multisequence MR imaging of the brain was performed without contrast    COMPARISON:  Head CT 06/25/2025. CTA 06/25/2025    FINDINGS:  Brain: There is a tiny acute lacunar type infarct within the lateral aspect of the right thalamus.  Minimal corresponding edema but no mass effect or associated hemorrhage.  No additional recent infarcts.  Background mild age related chronic small vessel ischemic and involutional changes as well as a tiny focus of cortical  gliosis/encephalomalacia within the high right parietal lobe near the midline.  No discrete parenchymal mass or space-occupying extra-axial fluid collections identified.  Midline Structures: The midline structures of the brain are normal.  Ventricles: The ventricles, sulci and cisterns are within normal limits.  Vasculature: The vascular flow voids at the base of the brain are within normal limits.  Calvarium: The visualized osseous structures are unremarkable.  Sinuses: The paranasal sinuses are adequately aerated.  Orbits: Ocular lens replacement on the right.  Mastoids: The mastoid air cells are adequately aerated.  Extracranial soft tissues: The visualized extracranial soft tissues are unremarkable.                                       CTA Head and Neck (xpd) (Final result)  Result time 06/25/25 09:47:15      Final result by Steven Weiner MD (06/25/25 09:47:15)                   Impression:      1. No acute intracranial CT findings.  No proximal intracranial large vessel occlusion or hemodynamically significant stenosis.  2. No hemodynamically significant stenosis within the neck noting approximate 50% stenosis of the proximal left subclavian artery and less than 50% stenosis of the proximal right cervical ICA.      Electronically signed by: Steven Weiner  Date:    06/25/2025  Time:    09:47               Narrative:    EXAMINATION:  CTA HEAD AND NECK (XPD)    CLINICAL HISTORY:  Neuro deficit, acute, stroke suspected;    TECHNIQUE:  Non contrast low dose axial images were obtained through the head. CT angiogram was performed from the level of the magdalena to the top of the head following the IV administration of 75mL of Omnipaque 350.   Sagittal and coronal reconstructions and maximum intensity projection reconstructions were performed. Arterial stenosis percentages are based on NASCET measurement criteria.    COMPARISON:  Head CT 09/30/2020.    FINDINGS:  CTA HEAD:    Vasculature: Mild irregular stenosis of the  proximal right PCA P1 segment extending into the P2 segment.  Mild atherosclerotic narrowing of the bilateral cavernous carotid arteries, right greater than left.  No other concerning focal stenosis or proximal large vessel occlusion identified.There is no suspicion of vascular malformation or saccular aneurysm.    Variant anatomy: Hypoplastic or aplastic right A1 DESHAUN.    Brain: There is no evidence of mass, mass effect, edema, midline shift, intracranial hemorrhage, or space-occupying extra-axial fluid collection. After the administration of contrast there is no abnormal enhancement.  No CT evidence of an acute major vascular territory infarct.    Ventricles/Sulci: The ventricles and sulci are appropriate in size for age.    Osseous Structures: The osseous structures are unremarkable in appearance.    Sinuses and orbits: Mild scattered mucosal thickening which appears chronic within the maxillary and ethmoid sinuses.  No acute orbital process.    Other: Mastoid air cells are clear.    CTA NECK:    Aorta: Left-sided conventional 3 vessel arch.  Approximate 50% stenosis involving the proximal left subclavian artery.    Vertebral Arteries: The origins of the vertebral arteries are patent.  No flow limiting stenosis, occlusion, or dissection.    Right Carotid: No flow limiting stenosis, occlusion, or dissection of the common carotid or internal carotid arteries.  Mild calcified plaque of the proximal ICA.  Right internal carotid artery: Less than 50 % stenosis by and NASCET.    Left Carotid: No flow limiting stenosis, occlusion, or dissection of the common carotid or internal carotid arteries. No significant plaque of the proximal ICA. Left internal carotid artery: 0 % stenosis by and NASCET.    Extravascular Anatomy: Scattered ground-glass densities throughout the visualized lungs which could be seen the setting of atelectasis in the absence of any clinical concern for any superimposed infectious/inflammatory process.  No consolidation identified.  Degenerative changes of the cervical spine including severe disc height loss at C3-C4 and C5-C6 and C6-C7.                                       Medications   sodium chloride 0.9% flush 10 mL (has no administration in time range)   sodium chloride 0.9% bolus 500 mL 500 mL (has no administration in time range)   labetaloL injection 10 mg (has no administration in time range)   bisacodyL suppository 10 mg (has no administration in time range)   enoxaparin injection 40 mg (has no administration in time range)   aspirin EC tablet 81 mg (has no administration in time range)   atorvastatin tablet 40 mg (40 mg Oral Given 6/25/25 1449)   clopidogreL tablet 75 mg (has no administration in time range)   famotidine tablet 20 mg (has no administration in time range)   iohexoL (OMNIPAQUE 350) 350 mg iodine/mL injection (75 mLs Intravenous Given 6/25/25 2522)   aspirin EC tablet 325 mg (325 mg Oral Given 6/25/25 1352)     Medical Decision Making  75-year-old man presents for left facial and left hand numbness that began 2 days ago.  Differential diagnosis includes acute CVA, electrolyte abnormality  Stroke workup initiated from the emergency department.      Thrombolysis Candidate? No, Out of window - Symptom onset > 4.5 hours    Delays to Thrombolysis?  Not Applicable  CT head negative.  TSH normal 2.437.  WBC 6.9, hemoglobin 13.1.  Glucose 94.  MRI brain shows acute lacunar infarct in the lateral aspect of the right thalamus.  Patient started on aspirin.  Discussed Hospital Medicine who will admit the patient for further stroke workup.      Amount and/or Complexity of Data Reviewed  Labs: ordered. Decision-making details documented in ED Course.  Radiology: ordered.    Risk  OTC drugs.  Decision regarding hospitalization.              Attending Attestation:             Attending ED Notes:   Critical Care Time MDM    The high probability of sudden, clinically significant deterioration in the  patient's condition required the highest level of my preparedness to intervene urgently.    The services I provided to this patient were to treat and/or prevent clinically significant deterioration that could result in permanent disability, chronic pain and/or death.     Services included the following: chart data review, reviewing nursing notes and/or old charts, documentation time, consultant collaboration regarding findings and treatment options, medication orders and management, direct patient care, vital sign assessments and ordering, interpreting and reviewing diagnostic studies/lab tests.    Aggregate critical care time was 50 minutes, which includes only time during which I was engaged in work directly related to the patient's care, as described above, whether at the bedside or elsewhere in the Emergency Department.     Johann Sin M.D.               ED Course as of 25 1518      0907 TSH: 2.437 [AS]   0907 Sodium: 139 [AS]   0907 Potassium: 4.2 [AS]   0907 Glucose: 94 [AS]   0907 Creatinine: 1.2 [AS]   0907 WBC: 6.91 [AS]   0907 Hemoglobin(!): 13.1 [AS]      ED Course User Index  [AS] Johann Sin MD                           Clinical Impression:  Final diagnoses:  [R29.818] Acute focal neurological deficit  [I63.81] Thalamic infarction (Primary)          ED Disposition Condition    Admit                       [1]   Social History  Tobacco Use    Smoking status: Former     Current packs/day: 0.00     Average packs/day: 3.0 packs/day for 25.0 years (75.0 ttl pk-yrs)     Types: Cigarettes     Start date: 1963     Quit date: 3/27/1983     Years since quittin.2     Passive exposure: Past    Smokeless tobacco: Never   Substance Use Topics    Alcohol use: Not Currently     Comment: occasionally    Drug use: No        Johann Sin MD  25 1518

## 2025-06-25 NOTE — SUBJECTIVE & OBJECTIVE
HPI:  75 y.o. male with HTN, prostate CA presented with left facial numbness and left hand numbness which has been ongoing for the past 2 days.  MRI completed showing right thalamic infarct.  [facility] consulted for these findings. Patient continues with very mild numbness to the left side.  He has never had these symptoms before.  Denies known stroke or TIA history.     Images personally reviewed and interpreted:  Study: CTA Head & Neck and MRI Brain  Study Interpretation: CTA head and neck: No early infarct signs.  No hemorrhage.  No mass effect. MRI brain: Punctate infarct right thalamus.  No hemorrhage.      Additional studies reviewed:   Study: Cardiac_Neuro: 2D echo  Study Interpretation: EF 60-65%; no wall motion abnormality noted; no thrombus/vegetation; normal left atrium.      Laboratory studies reviewed:  BMP:   Lab Results   Component Value Date     06/25/2025    K 4.2 06/25/2025     06/25/2025    CO2 23 06/25/2025    BUN 16 06/25/2025    BUN 23 05/16/2025    CREATININE 1.2 06/25/2025    CREATININE 1.2 05/16/2025    CALCIUM 8.8 06/25/2025     CBC:   Lab Results   Component Value Date    WBC 6.91 06/25/2025    WBC 8.70 05/16/2025    RBC 4.18 (L) 06/25/2025    HGB 13.1 (L) 06/25/2025    HCT 38.6 (L) 06/25/2025     06/25/2025    MCV 92 06/25/2025    MCH 31.3 (H) 06/25/2025    MCHC 33.9 06/25/2025     Lipid Panel:   Lab Results   Component Value Date    CHOL 153 06/25/2025    LDLCALC 92.0 06/25/2025    LDLCALC 96.4 05/16/2025    HDL 45 06/25/2025    HDL 41 05/16/2025    TRIG 80 06/25/2025     Coagulation:   Lab Results   Component Value Date    INR 1.0 06/25/2025     Hgb A1C:   Lab Results   Component Value Date    HGBA1C 5.7 (H) 06/25/2025     TSH:   Lab Results   Component Value Date    TSH 2.437 06/25/2025       Documentation personally reviewed:  Notes: Notes: ED notes and H&P     Assessment and plan:  75 y.o. male with HTN, prostate CA now with punctate infarct in the  right thalamus.  Etiology most likely small vessel disease given size and location.     Recommendations  ASA 81mg daily with Plavix 75mg daily x 21 days then monotherapy with ASA thereafter  Atorvastatin 40mg daily for goal LDL < 70  Aggressive stroke risk factor modification: HTN, pre-DM  Follow therapy recommendations  Can slowly reduce BP - avoid aggressive BP lowering and sudden drops in BP that can lead to hypoperfusion  No further inpatient stroke workup and patient can dispo with therapy recommendations once medically ready  Please contact us with any further questions or concerns or if patient has any acute neurological changes (new symptoms, worsening deficits).      Post charge discharge plan:  Clinic follow up: Vascular Neurology    Visit Type: in person or virtual  Timeframe: 4 weeks    Collaborating Physician, Dr. Mccollum, was available during today's encounter. Any change to plan along with cosign to appear in the EMR.

## 2025-06-25 NOTE — ASSESSMENT & PLAN NOTE
Patient's blood pressure range in the last 24 hours was: BP  Min: 132/66  Max: 172/95.The patient's inpatient anti-hypertensive regimen is listed below:  Current Antihypertensives  labetaloL injection 10 mg, Every 15 min PRN, Intravenous    Plan  - holding home blood pressure medications at this time; allow for permissive hypertension given acute CVA

## 2025-06-25 NOTE — TELEMEDICINE CONSULT
"[facility]  Tele-Vascular Neurology   Consult Note      Consult Information  Inpatient Consult to Neurology Services (General Neurology)  Consult performed by: Chapis Vegas NP  Consult ordered by: Wendy Reid PA-C          Consulting Provider:    Current Providers  No providers found    Patient Location:  [facility] * IP Unit    Spoke hospital nurse at bedside with patient assisting consultant.  Patient information was obtained from patient.       Vascular Neurology Documentation       NIH Scale:  1a. Level of Consciousness: 0-->Alert, keenly responsive  1b. LOC Questions: 0-->Answers both questions correctly  1c. LOC Commands: 0-->Performs both tasks correctly  2. Best Gaze: 0-->Normal  3. Visual: 0-->No visual loss  4. Facial Palsy: 0-->Normal symmetrical movements  5a. Motor Arm, Left: 0-->No drift, limb holds 90 (or 45) degrees for full 10 secs  5b. Motor Arm, Right: 0-->No drift, limb holds 90 (or 45) degrees for full 10 secs  6a. Motor Leg, Left: 0-->No drift, leg holds 30 degree position for full 5 secs  6b. Motor Leg, Right: 0-->No drift, leg holds 30 degree position for full 5 secs  7. Limb Ataxia: 0-->Absent  8. Sensory: 1-->Mild-to-moderate sensory loss, patient feels pinprick is less sharp or is dull on the affected side, or there is a loss of superficial pain with pinprick, but patient is aware of being touched  9. Best Language: 0-->No aphasia, normal  10. Dysarthria: 0-->Normal  11. Extinction and Inattention (formerly Neglect): 0-->No abnormality  Total (NIH Stroke Scale): 1      Modified [place]: Score: 0  Ronan Coma Scale:     ABCD2 Score:    [id number] Score:    HAS -BLED Score:    ICH Score:    Hunt & Caicedo Classification:      Blood pressure (!) 176/91, pulse 68, temperature 97.7 °F (36.5 °C), resp. rate 16, height 5' 9" (1.753 m), weight 92.5 kg (204 lb), SpO2 100%.    VAN Stroke Assessment: Negative    Medical Decision Making  HPI:  75 y.o. male with HTN, " prostate CA presented with left facial numbness and left hand numbness which has been ongoing for the past 2 days.  MRI completed showing right thalamic infarct.  TeleVascular Neurology consulted for these findings. Patient continues with very mild numbness to the left side.  He has never had these symptoms before.  Denies known stroke or TIA history.     Images personally reviewed and interpreted:  Study: CTA Head & Neck and MRI Brain  Study Interpretation: CTA head and neck: No early infarct signs.  No hemorrhage.  No mass effect. MRI brain: Punctate infarct right thalamus.  No hemorrhage.      Additional studies reviewed:   Study: Cardiac_Neuro: 2D echo  Study Interpretation: EF 60-65%; no wall motion abnormality noted; no thrombus/vegetation; normal left atrium.      Laboratory studies reviewed:  BMP:   Lab Results   Component Value Date     06/25/2025    K 4.2 06/25/2025     06/25/2025    CO2 23 06/25/2025    BUN 16 06/25/2025    BUN 23 05/16/2025    CREATININE 1.2 06/25/2025    CREATININE 1.2 05/16/2025    CALCIUM 8.8 06/25/2025     CBC:   Lab Results   Component Value Date    WBC 6.91 06/25/2025    WBC 8.70 05/16/2025    RBC 4.18 (L) 06/25/2025    HGB 13.1 (L) 06/25/2025    HCT 38.6 (L) 06/25/2025     06/25/2025    MCV 92 06/25/2025    MCH 31.3 (H) 06/25/2025    MCHC 33.9 06/25/2025     Lipid Panel:   Lab Results   Component Value Date    CHOL 153 06/25/2025    LDLCALC 92.0 06/25/2025    LDLCALC 96.4 05/16/2025    HDL 45 06/25/2025    HDL 41 05/16/2025    TRIG 80 06/25/2025     Coagulation:   Lab Results   Component Value Date    INR 1.0 06/25/2025     Hgb A1C:   Lab Results   Component Value Date    HGBA1C 5.7 (H) 06/25/2025     TSH:   Lab Results   Component Value Date    TSH 2.437 06/25/2025       Documentation personally reviewed:  Notes: Notes: ED notes and H&P     Assessment and plan:  75 y.o. male with HTN, prostate CA now with punctate infarct in the right thalamus.  Etiology most  likely small vessel disease given size and location.     Recommendations  ASA 81mg daily with Plavix 75mg daily x 21 days then monotherapy with ASA thereafter  Atorvastatin 40mg daily for goal LDL < 70  Aggressive stroke risk factor modification: HTN, pre-DM  Follow therapy recommendations  Can slowly reduce BP - avoid aggressive BP lowering and sudden drops in BP that can lead to hypoperfusion  No further inpatient stroke workup and patient can dispo with therapy recommendations once medically ready  Please contact us with any further questions or concerns or if patient has any acute neurological changes (new symptoms, worsening deficits).      Post charge discharge plan:  Clinic follow up: Vascular Neurology    Visit Type: in person or virtual  Timeframe: 4 weeks    Collaborating Physician, Dr. Mccollum, was available during today's encounter. Any change to plan along with cosign to appear in the EMR.        Additional Physical Exam, History, & ROS  ROS  Physical Exam    Neurological Exam  LOC: alert  Attention Span: Good   Language: No aphasia  Articulation: No dysarthria  Orientation: Person, Place, Time   Visual Fields: Full  EOM (CN III, IV, VI): Full/intact  Facial Sensation (CN V): Normal  Facial Movement (CN VII): Symmetric facial expression    Motor:   Full antigravity; Full power noted with nurse assistance in all 4 extremities  Cerebellum: No evidence of appendicular or axial ataxia  Sensation: Tim-hypoesthesia left      Past Medical History:   Diagnosis Date    Arthritis     CVA (cerebral vascular accident) 6/25/2025    Eczema     Hypertension     Lamellar macular hole of right eye 12/2018    Multiple rib fractures     Prostate cancer 8/20/2018    Prostate enlargement     Tinnitus of right ear 6/13/2016    Tinnitus, objective      Past Surgical History:   Procedure Laterality Date    CATARACT EXTRACTION Right 12/16/2020    rach truong     CATARACT EXTRACTION W/  INTRAOCULAR LENS IMPLANT Right 12/16/2020     Procedure: CEIOL OD;  Surgeon: Zaid Murray MD;  Location: [facility] OR;  Service: Ophthalmology;  Laterality: Right;    COLONOSCOPY N/A 7/28/2023    Procedure: COLONOSCOPY;  Surgeon: Xi Acosta MD;  Location: [facility];  Service: Endoscopy;  Laterality: N/A;    COLONOSCOPY W/ POLYPECTOMY      PROSTATE BIOPSY      PROSTATECTOMY  2018    VITRECTOMY BY PARS PLANA APPROACH Right 12/19/2018    Procedure: VITRECTOMY, PARS PLANA APPROACH;  Surgeon: CLAUDINE Melendrez MD;  Location: [address];  Service: Ophthalmology;  Laterality: Right;  40 min     Family History   Problem Relation Name Age of Onset    Diabetes Mother      Heart disease Father  82        acute MI    Stroke Sister      Hypertension Sister      Hypertension Sister      No Known Problems Sister      No Known Problems Daughter      No Known Problems Daughter      Glaucoma Neg Hx      Macular degeneration Neg Hx      Retinal detachment Neg Hx         Diagnoses  Problem Noted   Acute Ischemic Vba Thalamic Stroke 6/25/2025   Prediabetes 6/25/2025   Essential Hypertension 4/30/2014       Chapis Vegas NP    Neurology consultation requested by spoke provider. Audiovisual encounter with the patient performed using a secure connection.  Results and impressions from the visit are documented on this note and were communicated to the consulting provider/team via direct communication. The note has been shared for addition to the patients electronic medical record.

## 2025-06-25 NOTE — H&P
"[facility]  History & Physical    Patient Name: Efraín Anton  MRN: [id number]  Patient Class: IP- Inpatient  Admission Date: 6/25/2025  Attending Physician: Rubens Thomas MD   Primary Care Provider: Sandi Nix MD         Patient information was obtained from patient, past medical records, and ER records.     Subjective:     Principal Problem:CVA (cerebral vascular accident)    Chief Complaint:   Chief Complaint   Patient presents with    Tingling     Patient states he woke up 2 days ago and has tingling in the left side of his face and left arm, states he "doesn't feel right" states he was supposed to go to [place] and doesn't feel he could make the drive due to weakness         HPI: Efraín Anton is a 75 y.o. y.o. male with a PMHx of HTN and prostate cancer who presented to the ED with left facial numbness and left hand numbness onset x 2 days.  He denies any fevers, chest pain, shortness of breath, abdominal pain, nausea, focal weakness, speech difficulty or difficulty swallowing.  Patient is a nonsmoker.  He reports occasional EtOH use.  Denies a history of diabetes or prior stroke.  ED workup was significant for MRI brain concerning for acute lacunar infarct.Hospital Medicine was consulted for further workup and management of the patient.           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    Multiple rib fractures     Prostate cancer 8/20/2018    Prostate enlargement     Tinnitus of right ear 6/13/2016    Tinnitus, objective        Past Surgical History:   Procedure Laterality Date    CATARACT EXTRACTION Right 12/16/2020    rach truong     CATARACT EXTRACTION W/  INTRAOCULAR LENS IMPLANT Right 12/16/2020    Procedure: CEIOL OD;  Surgeon: Rach Truong MD;  Location: [facility] OR;  Service: Ophthalmology;  Laterality: Right;    COLONOSCOPY N/A 7/28/2023    Procedure: COLONOSCOPY;  " Surgeon: Xi Acosta MD;  Location: [facility];  Service: Endoscopy;  Laterality: N/A;    COLONOSCOPY W/ POLYPECTOMY      PROSTATE BIOPSY      PROSTATECTOMY  2018    VITRECTOMY BY PARS PLANA APPROACH Right 2018    Procedure: VITRECTOMY, PARS PLANA APPROACH;  Surgeon: CLAUDINE Melendrez MD;  Location: [address];  Service: Ophthalmology;  Laterality: Right;  40 min       Review of patient's allergies indicates:  No Known Allergies    No current facility-administered medications on file prior to encounter.     Current Outpatient Medications on File Prior to Encounter   Medication Sig    amLODIPine (NORVASC) 2.5 MG tablet Take 1 tablet (2.5 mg total) by mouth once daily.    lisinopriL (PRINIVIL,ZESTRIL) 5 MG tablet TAKE 1 TABLET EVERY DAY    dorzolamide-timolol 2-0.5% (COSOPT) 22.3-6.8 mg/mL ophthalmic solution Place 1 drop into the right eye every 12 (twelve) hours. (Patient not taking: Reported on 2024)    meloxicam (MOBIC) 7.5 MG tablet TAKE 1 TABLET ONE TIME DAILY WITH FOOD AS NEEDED FOR ARTHRITIS (Patient not taking: Reported on 3/20/2025)    tadalafiL (CIALIS) 5 MG tablet 5 mg.    triamcinolone acetonide 0.1% (KENALOG) 0.1 % cream Apply topically 2 (two) times daily. for 10 days (Patient not taking: Reported on 2025)     Family History       Problem Relation (Age of Onset)    Diabetes Mother    Heart disease Father (82)    Hypertension Sister, Sister    No Known Problems Sister, Daughter, Daughter    Stroke Sister          Tobacco Use    Smoking status: Former     Current packs/day: 0.00     Average packs/day: 3.0 packs/day for 25.0 years (75.0 ttl pk-yrs)     Types: Cigarettes     Start date: 1963     Quit date: 3/27/1983     Years since quittin.2     Passive exposure: Past    Smokeless tobacco: Never   Substance and Sexual Activity    Alcohol use: Not Currently     Comment: occasionally    Drug use: No    Sexual activity: Not Currently     Review of Systems    Constitutional:  Negative for fever.   HENT:  Negative for trouble swallowing.    Respiratory:  Negative for shortness of breath.    Cardiovascular:  Negative for chest pain.   Gastrointestinal:  Negative for abdominal pain, nausea and vomiting.   Neurological:  Positive for numbness (Left face). Negative for speech difficulty.     Objective:     Vital Signs (Most Recent):  Temp: 98 °F (36.7 °C) (06/25/25 0656)  Pulse: 60 (06/25/25 1333)  Resp: 20 (06/25/25 1333)  BP: (!) 158/76 (06/25/25 1333)  SpO2: 95 % (06/25/25 1333) Vital Signs (24h Range):  Temp:  [98 °F (36.7 °C)] 98 °F (36.7 °C)  Pulse:  [58-88] 60  Resp:  [20] 20  SpO2:  [95 %-100 %] 95 %  BP: (132-172)/(66-95) 158/76     Weight: 92.5 kg (204 lb)  Body mass index is 30.13 kg/m².     Physical Exam  Vitals and nursing note reviewed.   Constitutional:       General: He is not in acute distress.     Appearance: Normal appearance. He is normal weight.   HENT:      Head: Normocephalic and atraumatic.      Mouth/Throat:      Mouth: Mucous membranes are moist.   Eyes:      Extraocular Movements: Extraocular movements intact.      Conjunctiva/sclera: Conjunctivae normal.      Pupils: Pupils are equal, round, and reactive to light.   Cardiovascular:      Rate and Rhythm: Normal rate and regular rhythm.      Pulses: Normal pulses.      Heart sounds: Normal heart sounds.   Pulmonary:      Effort: Pulmonary effort is normal.      Breath sounds: Normal breath sounds.   Abdominal:      General: Abdomen is flat. Bowel sounds are normal. There is no distension.      Palpations: Abdomen is soft.      Tenderness: There is no abdominal tenderness.   Musculoskeletal:         General: Normal range of motion.      Cervical back: Normal range of motion and neck supple.   Skin:     General: Skin is warm.   Neurological:      Mental Status: He is alert and oriented to person, place, and time.      Cranial Nerves: No facial asymmetry.      Sensory: Sensory deficit present.       Motor: No weakness.      Comments: Patient is AAOx3.   BLE strength equal and symmetric.  BUE strength equal and symmetric.  Decreased sensation to left V2 and V3 distribution of trigeminal nerve.   No focal deficits.   Normal FTN.   Normal HTS.   No pronator drift.   Normal speech.    Psychiatric:         Mood and Affect: Mood normal.         Behavior: Behavior normal.              CRANIAL NERVES     CN III, IV, VI   Pupils are equal, round, and reactive to light.       Significant Labs: All pertinent labs within the past 24 hours have been reviewed.  CBC:   Recent Labs   Lab 06/25/25  0729   WBC 6.91   HGB 13.1*   HCT 38.6*        CMP:   Recent Labs   Lab 06/25/25  0729      K 4.2      CO2 23   GLU 94   BUN 16   CREATININE 1.2   CALCIUM 8.8   PROT 6.3   ALBUMIN 3.7   BILITOT 0.9   ALKPHOS 91   AST 20   ALT 16   ANIONGAP 9     Coagulation:   Recent Labs   Lab 06/25/25  0729   INR 1.0     Lipid Panel:   Recent Labs   Lab 06/25/25  0729   CHOL 153   HDL 45   LDLCALC 92.0   TRIG 80   CHOLHDL 29.4     TSH:   Recent Labs   Lab 06/25/25  0729   TSH 2.437       Significant Imaging: I have reviewed all pertinent imaging results/findings within the past 24 hours.    CTA head and neck:  1. No acute intracranial CT findings.  No proximal intracranial large vessel occlusion or hemodynamically significant stenosis.   2. No hemodynamically significant stenosis within the neck noting approximate 50% stenosis of the proximal left subclavian artery and less than 50% stenosis of the proximal right cervical ICA       MRI brain:   Acute lacunar type infarct within the lateral aspect of the right thalamus.  No associated hemorrhage or mass effect.     Assessment/Plan:     Assessment & Plan  CVA (cerebral vascular accident)    Antithrombotics for secondary stroke prevention: Antiplatelets: Aspirin: 81 mg daily  Clopidogrel: 75 mg daily    Statins for secondary stroke prevention and hyperlipidemia, if present:    Statins: Atorvastatin- 40 mg daily    Aggressive risk factor modification: HTN, Diet, Obesity     Rehab efforts: The patient has been evaluated by a stroke team provider and the therapy needs have been fully considered based off the presenting complaints and exam findings. The following therapy evaluations are needed: PT evaluate and treat, OT evaluate and treat, SLP evaluate and treat    Diagnostics ordered/pending: CT scan of head without contrast to asses brain parenchyma, CTA Head to assess vasculature , CTA Neck/Arch to assess vasculature, HgbA1C to assess blood glucose levels, Lipid Profile to assess cholesterol levels, MRI head without contrast to assess brain parenchyma, TTE to assess cardiac function/status , TSH to assess thyroid function    VTE prophylaxis: Mechanical prophylaxis: Place SCDs    BP parameters: Infarct: No intervention, SBP <220      Essential hypertension  Patient's blood pressure range in the last 24 hours was: BP  Min: 132/66  Max: 172/95.The patient's inpatient anti-hypertensive regimen is listed below:  Current Antihypertensives  labetaloL injection 10 mg, Every 15 min PRN, Intravenous    Plan  - holding home blood pressure medications at this time; allow for permissive hypertension given acute CVA  History of prostate cancer  Noted Hx    VTE Risk Mitigation (From admission, onward)           Ordered     enoxaparin injection 40 mg  Daily         06/25/25 1427     IP VTE HIGH RISK PATIENT  Once         06/25/25 1427     Place sequential compression device  Until discontinued         06/25/25 1427                                                Wendy Reid PA-C  Department of Hospital Medicine  [facility]

## 2025-06-25 NOTE — PROGRESS NOTES
Pharmacist Renal Dose Adjustment Note    Efraín Anton is a 75 y.o. male being treated with Famotidine    Patient Data:    Vital Signs (Most Recent):  Temp: 97.7 °F (36.5 °C) (06/25/25 1557)  Pulse: 68 (06/25/25 1557)  Resp: 16 (06/25/25 1557)  BP: (!) 176/91 (06/25/25 1557)  SpO2: 100 % (06/25/25 1557) Vital Signs (72h Range):  Temp:  [97.7 °F (36.5 °C)-98 °F (36.7 °C)]   Pulse:  [58-88]   Resp:  [16-20]   BP: (132-181)/(66-95)   SpO2:  [95 %-100 %]      Recent Labs   Lab 06/25/25  0729   CREATININE 1.2     Serum creatinine: 1.2 mg/dL 06/25/25 0729  Estimated creatinine clearance: 57.9 mL/min    Famotidine 20 mg BID will be changed to 20 mg daily per [facility] renal policy.    Pharmacist's Name: Dang Moreno  Pharmacist's Extension: [phone]

## 2025-06-25 NOTE — ASSESSMENT & PLAN NOTE
Antithrombotics for secondary stroke prevention: Antiplatelets: Aspirin: 81 mg daily  Clopidogrel: 75 mg daily    Statins for secondary stroke prevention and hyperlipidemia, if present:   Statins: Atorvastatin- 40 mg daily    Aggressive risk factor modification: HTN, Diet, Obesity     Rehab efforts: The patient has been evaluated by a stroke team provider and the therapy needs have been fully considered based off the presenting complaints and exam findings. The following therapy evaluations are needed: PT evaluate and treat, OT evaluate and treat, SLP evaluate and treat    Diagnostics ordered/pending: CT scan of head without contrast to asses brain parenchyma, CTA Head to assess vasculature , CTA Neck/Arch to assess vasculature, HgbA1C to assess blood glucose levels, Lipid Profile to assess cholesterol levels, MRI head without contrast to assess brain parenchyma, TTE to assess cardiac function/status , TSH to assess thyroid function    VTE prophylaxis: Mechanical prophylaxis: Place SCDs    BP parameters: Infarct: No intervention, SBP <220

## 2025-06-25 NOTE — SUBJECTIVE & OBJECTIVE
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    Multiple rib fractures     Prostate cancer 8/20/2018    Prostate enlargement     Tinnitus of right ear 6/13/2016    Tinnitus, objective        Past Surgical History:   Procedure Laterality Date    CATARACT EXTRACTION Right 12/16/2020    zaid murray     CATARACT EXTRACTION W/  INTRAOCULAR LENS IMPLANT Right 12/16/2020    Procedure: CEIOL OD;  Surgeon: Zaid Murray MD;  Location: [facility] OR;  Service: Ophthalmology;  Laterality: Right;    COLONOSCOPY N/A 7/28/2023    Procedure: COLONOSCOPY;  Surgeon: Xi Acosta MD;  Location: [facility];  Service: Endoscopy;  Laterality: N/A;    COLONOSCOPY W/ POLYPECTOMY      PROSTATE BIOPSY      PROSTATECTOMY  2018    VITRECTOMY BY PARS PLANA APPROACH Right 12/19/2018    Procedure: VITRECTOMY, PARS PLANA APPROACH;  Surgeon: CLAUDINE Melendrez MD;  Location: [address];  Service: Ophthalmology;  Laterality: Right;  40 min       Review of patient's allergies indicates:  No Known Allergies    No current facility-administered medications on file prior to encounter.     Current Outpatient Medications on File Prior to Encounter   Medication Sig    amLODIPine (NORVASC) 2.5 MG tablet Take 1 tablet (2.5 mg total) by mouth once daily.    lisinopriL (PRINIVIL,ZESTRIL) 5 MG tablet TAKE 1 TABLET EVERY DAY    dorzolamide-timolol 2-0.5% (COSOPT) 22.3-6.8 mg/mL ophthalmic solution Place 1 drop into the right eye every 12 (twelve) hours. (Patient not taking: Reported on 2/19/2024)    meloxicam (MOBIC) 7.5 MG tablet TAKE 1 TABLET ONE TIME DAILY WITH FOOD AS NEEDED FOR ARTHRITIS (Patient not taking: Reported on 3/20/2025)    tadalafiL (CIALIS) 5 MG tablet 5 mg.    triamcinolone acetonide 0.1% (KENALOG) 0.1 % cream Apply topically 2 (two) times daily. for 10 days (Patient not taking: Reported on 5/16/2025)     Family History       Problem Relation (Age of  Onset)    Diabetes Mother    Heart disease Father (82)    Hypertension Sister, Sister    No Known Problems Sister, Daughter, Daughter    Stroke Sister          Tobacco Use    Smoking status: Former     Current packs/day: 0.00     Average packs/day: 3.0 packs/day for 25.0 years (75.0 ttl pk-yrs)     Types: Cigarettes     Start date: 1963     Quit date: 3/27/1983     Years since quittin.2     Passive exposure: Past    Smokeless tobacco: Never   Substance and Sexual Activity    Alcohol use: Not Currently     Comment: occasionally    Drug use: No    Sexual activity: Not Currently     Review of Systems   Constitutional:  Negative for fever.   HENT:  Negative for trouble swallowing.    Respiratory:  Negative for shortness of breath.    Cardiovascular:  Negative for chest pain.   Gastrointestinal:  Negative for abdominal pain, nausea and vomiting.   Neurological:  Positive for numbness (Left face). Negative for speech difficulty.     Objective:     Vital Signs (Most Recent):  Temp: 98 °F (36.7 °C) (25 0656)  Pulse: 60 (25 1333)  Resp: 20 (25 1333)  BP: (!) 158/76 (25 1333)  SpO2: 95 % (25 1333) Vital Signs (24h Range):  Temp:  [98 °F (36.7 °C)] 98 °F (36.7 °C)  Pulse:  [58-88] 60  Resp:  [20] 20  SpO2:  [95 %-100 %] 95 %  BP: (132-172)/(66-95) 158/76     Weight: 92.5 kg (204 lb)  Body mass index is 30.13 kg/m².     Physical Exam  Vitals and nursing note reviewed.   Constitutional:       General: He is not in acute distress.     Appearance: Normal appearance. He is normal weight.   HENT:      Head: Normocephalic and atraumatic.      Mouth/Throat:      Mouth: Mucous membranes are moist.   Eyes:      Extraocular Movements: Extraocular movements intact.      Conjunctiva/sclera: Conjunctivae normal.      Pupils: Pupils are equal, round, and reactive to light.   Cardiovascular:      Rate and Rhythm: Normal rate and regular rhythm.      Pulses: Normal pulses.      Heart sounds: Normal heart  sounds.   Pulmonary:      Effort: Pulmonary effort is normal.      Breath sounds: Normal breath sounds.   Abdominal:      General: Abdomen is flat. Bowel sounds are normal. There is no distension.      Palpations: Abdomen is soft.      Tenderness: There is no abdominal tenderness.   Musculoskeletal:         General: Normal range of motion.      Cervical back: Normal range of motion and neck supple.   Skin:     General: Skin is warm.      Capillary Refill: Capillary refill takes 2 to 3 seconds.   Neurological:      Mental Status: He is alert and oriented to person, place, and time.      Cranial Nerves: No facial asymmetry.      Sensory: Sensory deficit present.      Motor: No weakness.      Comments: Patient is AAOx3.   BLE strength equal and symmetric.  BUE strength equal and symmetric.  Decreased sensation to left V2 and V3 distribution of trigeminal nerve.   No focal deficits.   Normal FTN.   Normal HTS.   No pronator drift.   Normal speech.    Psychiatric:         Mood and Affect: Mood normal.         Behavior: Behavior normal.              CRANIAL NERVES     CN III, IV, VI   Pupils are equal, round, and reactive to light.       Significant Labs: All pertinent labs within the past 24 hours have been reviewed.  CBC:   Recent Labs   Lab 06/25/25  0729   WBC 6.91   HGB 13.1*   HCT 38.6*        CMP:   Recent Labs   Lab 06/25/25  0729      K 4.2      CO2 23   GLU 94   BUN 16   CREATININE 1.2   CALCIUM 8.8   PROT 6.3   ALBUMIN 3.7   BILITOT 0.9   ALKPHOS 91   AST 20   ALT 16   ANIONGAP 9     Coagulation:   Recent Labs   Lab 06/25/25  0729   INR 1.0     Lipid Panel:   Recent Labs   Lab 06/25/25  0729   CHOL 153   HDL 45   LDLCALC 92.0   TRIG 80   CHOLHDL 29.4     TSH:   Recent Labs   Lab 06/25/25  0729   TSH 2.437       Significant Imaging: I have reviewed all pertinent imaging results/findings within the past 24 hours.    CTA head and neck:  1. No acute intracranial CT findings.  No proximal  intracranial large vessel occlusion or hemodynamically significant stenosis.   2. No hemodynamically significant stenosis within the neck noting approximate 50% stenosis of the proximal left subclavian artery and less than 50% stenosis of the proximal right cervical ICA       MRI brain:   Acute lacunar type infarct within the lateral aspect of the right thalamus.  No associated hemorrhage or mass effect.

## 2025-06-25 NOTE — HPI
Efraín Anton is a 75 y.o. y.o. male with a PMHx of HTN and prostate cancer who presented to the ED with left facial numbness and left hand numbness onset x 2 days.  He denies any fevers, chest pain, shortness of breath, abdominal pain, nausea, focal weakness, speech difficulty or difficulty swallowing.  Patient is a nonsmoker.  He reports occasional EtOH use.  Denies a history of diabetes or prior stroke.  ED workup was significant for MRI brain concerning for acute lacunar infarct.Hospital Medicine was consulted for further workup and management of the patient.

## 2025-06-26 VITALS
BODY MASS INDEX: 30.21 KG/M2 | HEIGHT: 69 IN | WEIGHT: 204 LBS | DIASTOLIC BLOOD PRESSURE: 78 MMHG | TEMPERATURE: 98 F | HEART RATE: 74 BPM | SYSTOLIC BLOOD PRESSURE: 162 MMHG | RESPIRATION RATE: 18 BRPM | OXYGEN SATURATION: 98 %

## 2025-06-26 LAB
ABSOLUTE EOSINOPHIL (SMH): 0.2 K/UL
ABSOLUTE MONOCYTE (SMH): 0.85 K/UL (ref 0.3–1)
ABSOLUTE NEUTROPHIL COUNT (SMH): 4.3 K/UL (ref 1.8–7.7)
ALBUMIN SERPL-MCNC: 3.7 G/DL (ref 3.5–5.2)
ALP SERPL-CCNC: 98 UNIT/L (ref 40–150)
ALT SERPL-CCNC: 16 UNIT/L (ref 10–44)
ANION GAP (SMH): 7 MMOL/L (ref 8–16)
APTT PPP: 23.5 SECONDS (ref 21–32)
AST SERPL-CCNC: 18 UNIT/L (ref 11–45)
BASOPHILS # BLD AUTO: 0.03 K/UL
BASOPHILS NFR BLD AUTO: 0.4 %
BILIRUB SERPL-MCNC: 0.8 MG/DL (ref 0.1–1)
BUN SERPL-MCNC: 18 MG/DL (ref 8–23)
CALCIUM SERPL-MCNC: 9 MG/DL (ref 8.7–10.5)
CHLORIDE SERPL-SCNC: 105 MMOL/L (ref 95–110)
CO2 SERPL-SCNC: 28 MMOL/L (ref 23–29)
CREAT SERPL-MCNC: 1.2 MG/DL (ref 0.5–1.4)
ERYTHROCYTE [DISTWIDTH] IN BLOOD BY AUTOMATED COUNT: 13.2 % (ref 11.5–14.5)
GFR SERPLBLD CREATININE-BSD FMLA CKD-EPI: >60 ML/MIN/1.73/M2
GLUCOSE SERPL-MCNC: 95 MG/DL (ref 70–110)
HCT VFR BLD AUTO: 41.4 % (ref 40–54)
HGB BLD-MCNC: 13.8 GM/DL (ref 14–18)
HOLD SPECIMEN: NORMAL
IMM GRANULOCYTES # BLD AUTO: 0.03 K/UL (ref 0–0.04)
IMM GRANULOCYTES NFR BLD AUTO: 0.4 % (ref 0–0.5)
LYMPHOCYTES # BLD AUTO: 2.44 K/UL (ref 1–4.8)
MAGNESIUM SERPL-MCNC: 1.9 MG/DL (ref 1.6–2.6)
MCH RBC QN AUTO: 31.6 PG (ref 27–31)
MCHC RBC AUTO-ENTMCNC: 33.3 G/DL (ref 32–36)
MCV RBC AUTO: 95 FL (ref 82–98)
NUCLEATED RBC (/100WBC) (SMH): 0 /100 WBC
PLATELET # BLD AUTO: 201 K/UL (ref 150–450)
PMV BLD AUTO: 9.7 FL (ref 9.2–12.9)
POTASSIUM SERPL-SCNC: 4.5 MMOL/L (ref 3.5–5.1)
POTASSIUM SERPL-SCNC: 5.5 MMOL/L (ref 3.5–5.1)
PROT SERPL-MCNC: 6.2 GM/DL (ref 6–8.4)
RBC # BLD AUTO: 4.37 M/UL (ref 4.6–6.2)
RELATIVE EOSINOPHIL (SMH): 2.6 % (ref 0–8)
RELATIVE LYMPHOCYTE (SMH): 31.2 % (ref 18–48)
RELATIVE MONOCYTE (SMH): 10.9 % (ref 4–15)
RELATIVE NEUTROPHIL (SMH): 54.5 % (ref 38–73)
SODIUM SERPL-SCNC: 140 MMOL/L (ref 136–145)
WBC # BLD AUTO: 7.83 K/UL (ref 3.9–12.7)

## 2025-06-26 PROCEDURE — 36415 COLL VENOUS BLD VENIPUNCTURE: CPT

## 2025-06-26 PROCEDURE — 85025 COMPLETE CBC W/AUTO DIFF WBC: CPT

## 2025-06-26 PROCEDURE — 85730 THROMBOPLASTIN TIME PARTIAL: CPT

## 2025-06-26 PROCEDURE — 92610 EVALUATE SWALLOWING FUNCTION: CPT

## 2025-06-26 PROCEDURE — 92523 SPEECH SOUND LANG COMPREHEN: CPT

## 2025-06-26 PROCEDURE — 84132 ASSAY OF SERUM POTASSIUM: CPT

## 2025-06-26 PROCEDURE — 94761 N-INVAS EAR/PLS OXIMETRY MLT: CPT

## 2025-06-26 PROCEDURE — 25000003 PHARM REV CODE 250: Performed by: STUDENT IN AN ORGANIZED HEALTH CARE EDUCATION/TRAINING PROGRAM

## 2025-06-26 PROCEDURE — 83735 ASSAY OF MAGNESIUM: CPT

## 2025-06-26 PROCEDURE — 97116 GAIT TRAINING THERAPY: CPT

## 2025-06-26 PROCEDURE — 80053 COMPREHEN METABOLIC PANEL: CPT

## 2025-06-26 PROCEDURE — 97161 PT EVAL LOW COMPLEX 20 MIN: CPT

## 2025-06-26 PROCEDURE — 97165 OT EVAL LOW COMPLEX 30 MIN: CPT

## 2025-06-26 PROCEDURE — 92507 TX SP LANG VOICE COMM INDIV: CPT

## 2025-06-26 PROCEDURE — 97535 SELF CARE MNGMENT TRAINING: CPT

## 2025-06-26 PROCEDURE — 25000003 PHARM REV CODE 250

## 2025-06-26 RX ORDER — LISINOPRIL 2.5 MG/1
5 TABLET ORAL DAILY
Status: DISCONTINUED | OUTPATIENT
Start: 2025-06-26 | End: 2025-06-26

## 2025-06-26 RX ORDER — AMLODIPINE BESYLATE 2.5 MG/1
2.5 TABLET ORAL DAILY
Status: DISCONTINUED | OUTPATIENT
Start: 2025-06-26 | End: 2025-06-26 | Stop reason: HOSPADM

## 2025-06-26 RX ORDER — LISINOPRIL 2.5 MG/1
5 TABLET ORAL DAILY
Status: DISCONTINUED | OUTPATIENT
Start: 2025-06-26 | End: 2025-06-26 | Stop reason: HOSPADM

## 2025-06-26 RX ORDER — CLOPIDOGREL BISULFATE 75 MG/1
75 TABLET ORAL DAILY
Qty: 21 TABLET | Refills: 0 | Status: SHIPPED | OUTPATIENT
Start: 2025-06-27 | End: 2025-07-18

## 2025-06-26 RX ORDER — ASPIRIN 81 MG/1
81 TABLET ORAL DAILY
Qty: 90 TABLET | Refills: 0 | Status: SHIPPED | OUTPATIENT
Start: 2025-06-27 | End: 2025-09-25

## 2025-06-26 RX ORDER — ATORVASTATIN CALCIUM 40 MG/1
40 TABLET, FILM COATED ORAL DAILY
Qty: 90 TABLET | Refills: 0 | Status: SHIPPED | OUTPATIENT
Start: 2025-06-27 | End: 2025-09-25

## 2025-06-26 RX ADMIN — ASPIRIN 81 MG: 81 TABLET, COATED ORAL at 08:06

## 2025-06-26 RX ADMIN — ATORVASTATIN CALCIUM 40 MG: 40 TABLET, FILM COATED ORAL at 08:06

## 2025-06-26 RX ADMIN — CLOPIDOGREL 75 MG: 75 TABLET ORAL at 08:06

## 2025-06-26 RX ADMIN — LISINOPRIL 5 MG: 2.5 TABLET ORAL at 11:06

## 2025-06-26 RX ADMIN — FAMOTIDINE 20 MG: 20 TABLET, FILM COATED ORAL at 08:06

## 2025-06-26 RX ADMIN — AMLODIPINE BESYLATE 2.5 MG: 2.5 TABLET ORAL at 08:06

## 2025-06-26 NOTE — HOSPITAL COURSE
Patient is admitted to the hospital with left facial numbness.  Patient was monitored closely throughout his hospital stay.  CTA head and neck was obtained and was negative for acute intracranial process or large vessel occlusion.  MRI brain was obtained and was concerning for acute lacunar type infarct.  Neurology was consulted on admission.  Echo was obtained.  PT/OT/SLP were consulted and recommended low intensity therapy.  Patient's/patient's family elected to pursue outpatient PT/OT/ST which was ordered.  Neurology recommended DAPT with aspirin and Plavix for 21 days followed by aspirin monotherapy thereafter in addition to Lipitor 40 mg, all of which was prescribed.  Patient is seen on day of discharge.  Patient to follow up with the PCP.  Referral made for Neurology on discharge.  Home health was set up at discharge.  Prescribed Lipitor, aspirin and Plavix per Neurology recommendations.  Return precautions discussed with the patient voiced understanding.  All questions answered.

## 2025-06-26 NOTE — PT/OT/SLP EVAL
Occupational Therapy Evaluation and Treatment    Name: Efraín Anton  MRN: [id number]  Admitting Diagnosis: Acute ischemic VBA thalamic stroke  Recent Surgery: * No surgery found *      Recommendations:     Discharge Recommendations: Low Intensity Therapy  Discharge Equipment Recommendations: shower chair  Barriers to discharge: fall risk    Assessment:     Efraín Anton is a 75 y.o. male with a medical diagnosis of Acute ischemic VBA thalamic stroke. Pt agreed to participate in OT. He completed bed mobility with SBA and sit <> stand and toilet transfer with CGA. He completed toileting with CGA and use of grab bar. Patient required min A to don his shoes to tie his shoes. He required set up A to open juice cup during breakfast secondary to left hand FMC mild deficits. Efraín demonstrated loss of balance while seated during UE ROM assessment, noted by a posterior lean and was able to self correct with use of a bed rail. He presents with performance deficits affecting function including weakness, impaired endurance, impaired self care skills, impaired functional mobility, gait instability, decreased upper extremity function and impaired fine motor. These performance deficits have resulted in activity limitations including, but not limited to: navigating around obstacles during ambulation, transitional movement patterns (kneeling, bending, reaching), lower body dressing and carrying objects. Patient's role as independent active adult has been mildly affected. Patient will benefit from skilled OT services to maximize level of independence with deficits listed above.    Pt was living alone in an elevated house ~ 1 week out of the month and the other 3 weeks living with his daughter in [place]. He has had 3 falls in the last 6 months and reported he fell while gardening.    Rehab Prognosis: Good; patient would benefit from acute OT services to address these deficits and reach maximum level of function.      Plan:      Patient to be seen 5 x/week to address the above listed problems via self-care/home management, therapeutic activities, neuromuscular re-education, therapeutic exercises  Plan of Care Expires: 07/10/25  Plan of Care Reviewed with: patient    Subjective     Chief Complaint: Left arm and mouth numbness   Patient Comments/Goals: To return to PLOF  Pain/Comfort:  Pain Rating 1: 0/10      Social History:  Living Environment: Patient lives alone in an elevated house in [place] with number of outside stair(s): 12 and tub-shower combo. He reported he lives with his other daughter in [place] ~ 3 weeks per month.   Prior Level of Function: Prior to admission, patient was independent.  Roles and Routines: Patient was driving and retired prior to admission.  Equipment Used at Home: cane, straight  DME owned (not currently used): single point cane  Assistance Upon Discharge: family    Objective:     Communicated with nurse prior to session. Patient found HOB elevated with bed alarm, SCD, REBEKAH hose and telemetry upon OT entry to room.    General Precautions: Standard, fall   Orthopedic Precautions: N/A   Braces: N/A    Respiratory Status: Room air    Cognitive and Psychosocial Function:  Oriented to: Person, Place, Time, Situation  Follows Commands/Attention: follows two-step commands  Communication: clear/fluent  Memory: No Deficits noted  Safety Awareness/Insight to Disability: intact  Mood/Affect/Coping Skills/Emotional Control: Cooperative and Pleasant    Hearing: Intact; He owns hearing aides, however does not use them.  Vision: wears glasses for reading    Physical Exam:  Postural examination/scapula alignment: -       Rounded shoulders  -       Forward head  Skin integrity: Visible skin intact  Hand dominance: Right        Left UE Right UE   UE Edema None noted None noted   UE ROM limited by shoulder flexion ~ 110* limited by shoulder flexion ~125*   UE Strength 2/5: shoulder flexion; elbow extension: 4+/5; elbow flexion:  "4/5   2/5 shoulder flexion: elbow extension/flexion: 5/5    Strength grasp WNL grasp WNL   Sensation LUE  IMPAIRED:  light/touch, sharp/dull, and proprioception RUE  INTACT:  WFL   Fine Motor Coordination:  LUE  IMPAIRED:  hand, finger to nose, hand thumb/finger opposition skills, and manipulation of objects RUE  INTACT:    WFL   Gross Motor Coordination: LUE  IMPAIRED:  drift RUE  INTACT:  WFL     Occupational Performance    Gait belt applied - Yes    Bed Mobility:   Scooting anteriorly to EOB to have both feet on floor: stand by assistance  Supine to sit with stand by assistance  Supine to sit with stand by assistance  Sit to Supine with stand by assistance    Functional Mobility/Transfers  Static Sitting EOB: independence  Dynamic Sitting EOB: contact guard assistance  Static Standing Balance: stand by assistance  Dynamic Standing Balance: contact guard assistance  Sit <> Stand Transfer with contact guard assistance with hand-held assist  Toilet Transfer Step Transfer technique with contact guard assistance with hand-held assist    Activities of Daily Living:  Feeding: set up assistance to open juice cup  Grooming: set up assistance seated EOB  Upper Body Dressing: stand by assistance  Lower Body Dressing: minimum assistance to don shoes to tie shoes  Toileting: contact guard assistance with use of grab bar    30 Second Sit to Stand Test  How many transfers the patient can complete in thirty seconds: 9  Normative data for community dwelling elderly:  60-70y/o: 14  70-80y/o: 12.9  80-90 y/o: 11.9    Five Times Sit to Stand Test:  How long the patient takes to completed 5 sit to stand form chair with arms folded across chest: 7 seconds  "Inability to perform test in less than 13.6 seconds indicates increased disability and Morbidity." (Shyanne 2000).  Normative values for community dwelling elderly:   60-70y/o: 11.4 seconds  70-80y/o: 12.6 seconds  80-88y/o: 14.8 seconds    Functional Upper Extremity Levels " (FUEL): 1-7: 6  6 Functional Assist - Involved arm and hand are used actively during all tasks but may require increased time and/or effort  AROM in involved arm and hand is within functional limits  Fine motor coordination is functional and including in-hand manipulation  Involved UE remains less dominant even if dominant prior to injury, due to awkward movement and/or weakness     AMPAC 6 Click ADL:  AMPAC Total Score: 21    Treatment & Education:  Patient was educated on role of OT/POC, importance of getting out of bed during hospitalization, equipment needs, discharge planning and reviewed use of call bell for assistance.   OT educated pt on use of adapted techniques for standing ADLs to increase safety and independence with task and reduce fall risk.  Patient seen for safe self care activities as stated above.  Importance of OOB activity and sitting UIC explained to pt to prevent negative effects of prolonged bed rest. Patient verbalized understanding.   Recommendations were made for slip on tennis shoes.     Patient left  with all lines intact, call button in reach and bed alarm on.    GOALS:   Multidisciplinary Problems       Occupational Therapy Goals          Problem: Occupational Therapy    Goal Priority Disciplines Outcome Interventions   Occupational Therapy Goal     OT, PT/OT Progressing    Description: Goals to be met by: 7/10/2025     Patient will increase functional independence with ADLs by performing:    Feeding with [place].  UE Dressing with [place].  LE Dressing with Modified [place].  Grooming while seated with Modified [place].  Toileting from toilet with Modified [place] for hygiene and clothing management.   Toilet transfer to toilet with Modified [place].                         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    Multiple rib fractures     Prostate cancer 8/20/2018    Prostate enlargement     Tinnitus of right ear 6/13/2016    Tinnitus, objective          Past Surgical History:   Procedure Laterality Date    CATARACT EXTRACTION Right 12/16/2020    zaid murray     CATARACT EXTRACTION W/  INTRAOCULAR LENS IMPLANT Right 12/16/2020    Procedure: CEIOL OD;  Surgeon: Zaid Murray MD;  Location: [facility];  Service: Ophthalmology;  Laterality: Right;    COLONOSCOPY N/A 7/28/2023    Procedure: COLONOSCOPY;  Surgeon: Xi Acosta MD;  Location: [facility];  Service: Endoscopy;  Laterality: N/A;    COLONOSCOPY W/ POLYPECTOMY      PROSTATE BIOPSY      PROSTATECTOMY  2018    VITRECTOMY BY PARS PLANA APPROACH Right 12/19/2018    Procedure: VITRECTOMY, PARS PLANA APPROACH;  Surgeon: CLAUDINE Melendrez MD;  Location: [address];  Service: Ophthalmology;  Laterality: Right;  40 min       Time Tracking:     OT Date of Treatment: 06/26/25  OT Start Time: 0727  OT Stop Time: 0830  OT Total Time (min): 63 min    Billable Minutes: Evaluation 22 and Self Care/Home Management 41    6/26/2025

## 2025-06-26 NOTE — PLAN OF CARE
[facility]  Initial Discharge Assessment       Primary Care Provider: Sandi Nix MD    Admission Diagnosis: Acute focal neurological deficit [R29.818]    Admission Date: 6/25/2025  Expected Discharge Date: 6/26/2025    Transition of Care Barriers: None    Payor: HU[employer] MANAGED MEDICARE / Plan: HU[employer] MEDICARE HMO / Product Type: Capitation /     Extended Emergency Contact Information  Primary Emergency Contact: Mima Renner  Address: Unknown           [place] [address] [place]  Mobile Phone: [phone]  Relation: Friend  Preferred language: English   needed? No    Discharge Plan A: Home  Discharge Plan B: Home      [facility] - [place], [place] - [address]  [address] [facility]  [place] [place] [address]  Phone: [phone] Fax: [phone]    [facility] - [facility] [address] [facility]  [address] [facility] [address]  Phone: [phone] Fax: [phone]    Discharge assessment completed at bedside with pt.  Verified facesheet information.  Reports lives at listed address [facility], is independent with activities and drives self to apts. Denies HH, HD, blood thinners, opt services, recent hospital stay and DME.  States has but doesn't use cane.  Discharge plan is home.  [facility] will call friend to provide transportation home at discharge.  CM to follow for needs.   Initial Assessment (most recent)       Adult Discharge Assessment - 06/26/25 1359          Discharge Assessment    Assessment Type Discharge Planning Assessment     Confirmed/corrected address, phone number and insurance Yes     Confirmed Demographics Correct on Facesheet     Source of Information patient     People in Home alone     Facility Arrived From: home     Do you expect to return to your current living situation? Yes     Do you have help at home or someone to help you manage your care at home? No     Prior to hospitilization cognitive  status: Alert/Oriented     Current cognitive status: Alert/Oriented     Walking or Climbing Stairs Difficulty no     Dressing/Bathing Difficulty no     Equipment Currently Used at Home cane, straight     Readmission within 30 days? No     Patient currently being followed by outpatient case management? No     Do you currently have service(s) that help you manage your care at home? No     Do you take prescription medications? Yes     Do you have prescription coverage? Yes     Do you have any problems affording any of your prescribed medications? No     Is the patient taking medications as prescribed? yes     Who is going to help you get home at discharge? States will call friend for ride home     How do you get to doctors appointments? car, drives self     Are you on dialysis? No     Do you take coumadin? No     Discharge Plan A Home     Discharge Plan B Home     DME Needed Upon Discharge  none     Discharge Plan discussed with: Patient     Transition of Care Barriers None

## 2025-06-26 NOTE — CONSULTS
[facility]  Adult Nutrition  Education Short Note    Admit Date: 6/25/2025   Total duration of encounter: 1 day   Patient Age: 75 y.o.    Nutrition Education    Previous education: no    Diet at home: No specific diet     Written information provided and explained on heart healthy diet on heart healthy diet.     Comments: RD consulted for stroke pathway. Educated pt on heart healthy diet. Education focused on including healthy fats- gave examples and lowering LDL levels by excluding saturated/trans fat in the diet. Went over types of foods that have saturated/trans fat. Encouraged cooking with olive oil instead of butter. Choosing whole grains over refined grains, choosing canned foods with no salt added and plain frozen veggies instead of veggies cooked in butter and cream sauces. Encouraged patient to choose leaner cuts of meat and decreasing high fat meats such as garner, sausage, hot dogs, etc. Educated pt on decreasing sodium in diet. To try not to cook with salt and use herbs and spices to make food more flavorful. To limit eating out-if patient chooses to eat out, informed patient to discuss with  to cook meats and veggies with no salt and olive oil instead of butter. Pt states he usually makes 2-3 meals per day as pot meals. Reports of usually adding a mixed amount of foods to the pot and with one tsp of salt and pack of garner.RD educated on modified alternatives to favorite meals.    Provided handouts on all of these topics for pt to use in the future. Also provided RD contact information for pt to call with future questions.      Pt denies c/s issues, recent unintentional wt loss, and GI discomfort at this time. Reports of a good appetite. No other nutrition related concerns at this time. RD provided educational handouts.     Discussed with: patient    Educational Need? yes    Barriers: none identified    Interventions: Fat modified diet, Sodium modified diet, Content related  nutrition education, and Collaboration and Referral of Nutrition Care    Patient and/or family comprehend instructions: yes    Outcome: Verbalizes understanding     Thanks for the consult!    Honey Weaver RD 06/26/2025 10:35 AM

## 2025-06-26 NOTE — PLAN OF CARE
Problem: Occupational Therapy  Goal: Occupational Therapy Goal  Description: Goals to be met by: 7/10/2025     Patient will increase functional independence with ADLs by performing:    Feeding with [place].  UE Dressing with [place].  LE Dressing with Modified [place].  Grooming while seated with Modified [place].  Toileting from toilet with Modified [place] for hygiene and clothing management.   Toilet transfer to toilet with Modified [place].    Outcome: Progressing     OT evaluation completed. POC established.

## 2025-06-26 NOTE — PLAN OF CARE
Pt clear for DC from case management standpoint. Discharging to home.  Secure chat sent to DC clinic for hospital follow up within 7 days per provider request.  Spoke with pt at bedside, states will contact friend to provide transportation home.  Called and spoke with pt daughter per pt request.  Notified her of dx, new meds and discharging home today with Opt PT/ST.  Understanding verbalized.     06/26/25 1423   Final Note   Assessment Type Final Discharge Note   Anticipated Discharge Disposition Home   Post-Acute Status   Discharge Delays (!) Personal Transportation

## 2025-06-26 NOTE — PT/OT/SLP EVAL
Physical Therapy Evaluation    Patient Name:  Efraín Anton   MRN:  [id number]    Recommendations:     Discharge Recommendations: Low Intensity Therapy   Discharge Equipment Recommendations: none   Barriers to discharge: Decreased caregiver support    Assessment:     Efraín Anton is a 75 y.o. male admitted with a medical diagnosis of Acute ischemic VBA thalamic stroke.  He presents with the following impairments/functional limitations: weakness, impaired endurance, impaired sensation, impaired functional mobility .    Pt seen supine in bed, alert/lpleasant. Stated L lip numbness still present but is moving and using L side well. Pt requiring supervision to CGA for safe mobility and ambulated at hallways 250ft with no gait deficits, bathroom use to void and OOB chair.  Pt is home alone and drives to [place] to daughter and family each month- staying x 3 weeks and home x 1 week  Stated that he attends OP PT in [place] for his knee pain.    Rehab Prognosis: Good; patient would benefit from acute skilled PT services to address these deficits and reach maximum level of function.    Recent Surgery: * No surgery found *      Plan:     During this hospitalization, patient to be seen daily to address the identified rehab impairments via gait training, therapeutic activities, therapeutic exercises and progress toward the following goals:    Plan of Care Expires:  07/15/25    Subjective   Stated that he frequently visits youngest daughter in [place] and help care for G kids- taking them to piano and wrestling activities  Chief Complaint: a little numb in lip L  Patient/Family Comments/goals: get well  Pain/Comfort:  Pain Rating 1: 0/10    Patients cultural, spiritual, Church conflicts given the current situation:      Living Environment:  Home alone  Prior to admission, patients level of function was ambuylatory/drives.  Equipment used at home: none.  DME owned (not currently used): none.  Upon discharge, patient will have  assistance from unknown.    Objective:     Communicated with nurse Tillman prior to session.  Patient found HOB elevated with telemetry  upon PT entry to room.    General Precautions: Standard, fall  Orthopedic Precautions:N/A   Braces: N/A  Respiratory Status: Room air    Exams:  Postural Exam:  Patient presented with the following abnormalities:    -       Rounded shoulders  -       Forward head  -       BMI 30.13  RLE ROM: WFL  RLE Strength: WFL  LLE ROM: WFL  LLE Strength: WFL    Functional Mobility:  Bed Mobility:     Scooting: modified independence  Supine to Sit: supervision  Transfers:     Sit to Stand:  stand by assistance with no AD  Bed to Chair: contact guard assistance with  no AD  using  Stand Pivot  Toilet Transfer: contact guard assistance with  no AD  using  standing to void  Gait: 250ft with CGA/supervision      AM-PAC 6 CLICK MOBILITY  Total Score:19       Treatment & Education:  Patient was educated on the importance of OOB activity and functional mobility to negate negative effects of prolonged bed rest during hospitalization, safe transfers and ambulation, and D/C planning   OOB chair post PT with all needs within reach    Patient left up in chair with all lines intact, call button in reach, and chair alarm on.    GOALS:   Multidisciplinary Problems       Physical Therapy Goals          Problem: Physical Therapy    Goal Priority Disciplines Outcome Interventions   Physical Therapy Goal     PT, PT/OT Progressing    Description: Goals to be met by: 07-     Patient will increase functional independence with mobility by performin. Supine to sit with Kinney  2. Sit to stand transfer with Supervision  3. Bed to chair transfer with Supervision using No Assistive Device  4. Gait  x over 500 feet with Supervision using No Assistive Device.   5. Lower extremity exercise program x20 reps                        DME Justifications:  No DME recommended requiring DME  justifications    History:     Past Medical History:   Diagnosis Date    Arthritis     CVA (cerebral vascular accident) 6/25/2025    Eczema     Hypertension     Lamellar macular hole of right eye 12/2018    Multiple rib fractures     Prostate cancer 8/20/2018    Prostate enlargement     Tinnitus of right ear 6/13/2016    Tinnitus, objective        Past Surgical History:   Procedure Laterality Date    CATARACT EXTRACTION Right 12/16/2020    zaid murray     CATARACT EXTRACTION W/  INTRAOCULAR LENS IMPLANT Right 12/16/2020    Procedure: CEIOL OD;  Surgeon: Zaid Murray MD;  Location: [facility];  Service: Ophthalmology;  Laterality: Right;    COLONOSCOPY N/A 7/28/2023    Procedure: COLONOSCOPY;  Surgeon: Xi Acosta MD;  Location: [facility];  Service: Endoscopy;  Laterality: N/A;    COLONOSCOPY W/ POLYPECTOMY      PROSTATE BIOPSY      PROSTATECTOMY  2018    VITRECTOMY BY PARS PLANA APPROACH Right 12/19/2018    Procedure: VITRECTOMY, PARS PLANA APPROACH;  Surgeon: CLAUDINE Melendrez MD;  Location: [address];  Service: Ophthalmology;  Laterality: Right;  40 min       Time Tracking:     PT Received On: 06/26/25  PT Start Time: 1040     PT Stop Time: 1103  PT Total Time (min): 23 min     Billable Minutes: Evaluation 10 and Gait Training 13      06/26/2025

## 2025-06-26 NOTE — PLAN OF CARE
Problem: Stroke, Ischemic (Includes Transient Ischemic Attack)  Goal: Optimal Coping  Outcome: Progressing  Goal: Optimal Cerebral Tissue Perfusion  Outcome: Progressing  Goal: Improved Communication Skills  Outcome: Progressing  Goal: Improved Sensorimotor Function  Outcome: Progressing     Problem: Adult Inpatient Plan of Care  Goal: Plan of Care Review  Outcome: Progressing  Goal: Patient-Specific Goal (Individualized)  Outcome: Progressing  Goal: Readiness for Transition of Care  Outcome: Progressing

## 2025-06-26 NOTE — DISCHARGE SUMMARY
Critical access hospital Medicine  Discharge Summary      Patient Name: Efraín Anton  MRN: [id number]  JENNA: [id number]  Patient Class: IP- Inpatient  Admission Date: 6/25/2025  Hospital Length of Stay: 1 days  Discharge Date and Time: 06/26/2025 1:47 PM  Attending Physician: Rubens Thomas MD   Discharging Provider: Wendy Reid PA-C  Primary Care Provider: Sandi Nix MD    Primary Care Team: Networked reference to record PCT     HPI:   Efraín Anton is a 75 y.o. y.o. male with a PMHx of HTN and prostate cancer who presented to the ED with left facial numbness and left hand numbness onset x 2 days.  He denies any fevers, chest pain, shortness of breath, abdominal pain, nausea, focal weakness, speech difficulty or difficulty swallowing.  Patient is a nonsmoker.  He reports occasional EtOH use.  Denies a history of diabetes or prior stroke.  ED workup was significant for MRI brain concerning for acute lacunar infarct.Hospital Medicine was consulted for further workup and management of the patient.           * No surgery found *      Hospital Course:   Patient is admitted to the hospital with left facial numbness.  Patient was monitored closely throughout his hospital stay.  CTA head and neck was obtained and was negative for acute intracranial process or large vessel occlusion.  MRI brain was obtained and was concerning for acute lacunar type infarct.  Neurology was consulted on admission.  Echo was obtained.  PT/OT/SLP were consulted and recommended low intensity therapy.  Patient's/patient's family elected to pursue outpatient PT/OT/ST which was ordered.  Neurology recommended DAPT with aspirin and Plavix for 21 days followed by aspirin monotherapy thereafter in addition to Lipitor 40 mg, all of which was prescribed.  Patient is seen on day of discharge.  Patient to follow up with the PCP.  Referral made for Neurology on discharge.  Home health was set up at discharge.  Prescribed  Lipitor, aspirin and Plavix per Neurology recommendations.  Return precautions discussed with the patient voiced understanding.  All questions answered.     Goals of Care Treatment Preferences:  Code Status: Full Code         Consults:   Consults (From admission, onward)          Status Ordering Provider     Inpatient consult to Physical Medicine Rehab  Once        Provider:  (Not yet assigned)    Acknowledged ARLEY WRIGHT     Inpatient consult to Registered Dietitian/Nutritionist  Once        Provider:  (Not yet assigned)    Completed ARLEY WRIGHT     IP consult to case management/social work  Once        Provider:  (Not yet assigned)    Acknowledged ARLEY WRIGHT     Inpatient Consult to Neurology Services (General Neurology)  Once        Provider:  Chapis Vegas NP    Completed ARLEY WRIGHT            Final Active Diagnoses:    Diagnosis Date Noted POA    PRINCIPAL PROBLEM:  Acute ischemic VBA thalamic stroke [I63.81] 06/25/2025 Yes    Prediabetes [R73.03] 06/25/2025 Yes    History of prostate cancer [Z85.46] 08/20/2018 Not Applicable    Essential hypertension [I10] 04/30/2014 Yes      Problems Resolved During this Admission:       Discharged Condition: good    Disposition: Home-Health Care [facility]    Follow Up:   Follow-up Information       Sandi Nix MD Follow up.    Specialty: Family Medicine  Contact information:  [address] [facility] [facility] [address]  [phone]                           Patient Instructions:      Ambulatory referral/consult to Neurology   Standing Status: Future   Referral Priority: Urgent Referral Type: Consultation   Referral Reason: Specialty Services Required   Requested Specialty: Neurology   Number of Visits Requested: 1     Ambulatory referral/consult to Home Health   Standing Status: Future   Referral Priority: Routine Referral Type: Home Health   Referral Reason: Specialty Services Required   Requested Specialty: Home Health Services   Number of Visits Requested: 1      Notify your health care provider if you experience any of the following:  temperature >100.4     Notify your health care provider if you experience any of the following:  persistent nausea and vomiting or diarrhea     Notify your health care provider if you experience any of the following:  severe uncontrolled pain     Notify your health care provider if you experience any of the following:  redness, tenderness, or signs of infection (pain, swelling, redness, odor or green/yellow discharge around incision site)     Notify your health care provider if you experience any of the following:  difficulty breathing or increased cough     Notify your health care provider if you experience any of the following:  increased confusion or weakness     Activity as tolerated       Significant Diagnostic Studies: Labs: CMP   Recent Labs   Lab 06/25/25 0729 06/26/25 0358 06/26/25 0827    140  --    K 4.2 5.5* 4.5    105  --    CO2 23 28  --    GLU 94 95  --    BUN 16 18  --    CREATININE 1.2 1.2  --    CALCIUM 8.8 9.0  --    PROT 6.3 6.2  --    ALBUMIN 3.7 3.7  --    BILITOT 0.9 0.8  --    ALKPHOS 91 98  --    AST 20 18  --    ALT 16 16  --    ANIONGAP 9 7*  --     and CBC   Recent Labs   Lab 06/25/25 0729 06/26/25 0358   WBC 6.91 7.83   HGB 13.1* 13.8*   HCT 38.6* 41.4    201       Pending Diagnostic Studies:       Procedure Component Value Units Date/Time    HCV Virus Hold Specimen [[id number]] Collected: 06/25/25 0729    Order Status: Sent Lab Status: In process Updated: 06/25/25 0735    Specimen: Blood     HIV Virus Confirmation Hold Specimen [[id number]] Collected: 06/25/25 0730    Order Status: Sent Lab Status: In process Updated: 06/25/25 0734    Specimen: Blood            Medications:  Reconciled Home Medications:      Medication List        PAUSE taking these medications      meloxicam 7.5 MG tablet  Wait to take this until your doctor or other care provider tells you to start again.  Commonly  known as: MOBIC  TAKE 1 TABLET ONE TIME DAILY WITH FOOD AS NEEDED FOR ARTHRITIS            START taking these medications      aspirin 81 MG EC tablet  Commonly known as: ECOTRIN  Take 1 tablet (81 mg total) by mouth once daily.  Start taking on: June 27, 2025     atorvastatin 40 MG tablet  Commonly known as: LIPITOR  Take 1 tablet (40 mg total) by mouth once daily.  Start taking on: June 27, 2025     clopidogreL 75 mg tablet  Commonly known as: PLAVIX  Take 1 tablet (75 mg total) by mouth once daily. for 21 days  Start taking on: June 27, 2025            CONTINUE taking these medications      amLODIPine 2.5 MG tablet  Commonly known as: NORVASC  Take 1 tablet (2.5 mg total) by mouth once daily.     lisinopriL 5 MG tablet  Commonly known as: PRINIVIL,ZESTRIL  TAKE 1 TABLET EVERY DAY     omeprazole 20 MG capsule  Commonly known as: PRILOSEC  Take 20 mg by mouth once daily.     tadalafiL 5 MG tablet  Commonly known as: CIALIS  5 mg.     triamcinolone acetonide 0.1% 0.1 % cream  Commonly known as: KENALOG  Apply topically 2 (two) times daily. for 10 days              Indwelling Lines/Drains at time of discharge:   Lines/Drains/Airways       None                       Time spent on the discharge of patient: 35 minutes         Wendy Reid PA-C  Department of Hospital Medicine  [facility]/Surg

## 2025-06-26 NOTE — PT/OT/SLP EVAL
Speech Language Pathology Evaluation  Cognitive/Bedside Swallow    Patient Name:  Efraín Anton   MRN:  [id number]  Admitting Diagnosis: Acute ischemic VBA thalamic stroke    Recommendations:                  General Recommendations:  Cognitive-linguistic therapy  Diet recommendations:  Regular, Thin   Aspiration Precautions: Standard aspiration precautions   General Precautions: Standard, fall  Communication strategies:  none  Discharge recommendations:  Low Intensity Therapy   Barriers to Discharge:  None    Assessment:     Efraín Anton is a 75 y.o. male with an admitting diagnosis of Acute ischemic VBA thalamic stroke. Pt seen for clinical swallow and cognitive communication evaluations.  He presents with mild cognitive linguistic impairment c/b deficits in the areas of immediate memory, STM and working memory. Clinical swallow evaluation revealed oropharyngeal pharyngeal swallow to be WFL. REC Regular (IDDSI 7) textures with thin liquids (IDDSI 0). Diet recs communicated patient's nurse, Layne, @ 1140.  Initiate skilled ST to address deficits.    History:   PER HPI: Efraín Anton is a 75 y.o. y.o. male with a PMHx of HTN and prostate cancer who presented to the ED with left facial numbness and left hand numbness onset x 2 days.  He denies any fevers, chest pain, shortness of breath, abdominal pain, nausea, focal weakness, speech difficulty or difficulty swallowing.  Patient is a nonsmoker.  He reports occasional EtOH use.  Denies a history of diabetes or prior stroke.  ED workup was significant for MRI brain concerning for acute lacunar infarct.Hospital Medicine was consulted for further workup and management of the patient.      Past Medical History:   Diagnosis Date    Arthritis     CVA (cerebral vascular accident) 6/25/2025    Eczema     Hypertension     Lamellar macular hole of right eye 12/2018    Multiple rib fractures     Prostate cancer 8/20/2018    Prostate enlargement     Tinnitus of right ear  6/13/2016    Tinnitus, objective        Past Surgical History:   Procedure Laterality Date    CATARACT EXTRACTION Right 12/16/2020    rach truong     CATARACT EXTRACTION W/  INTRAOCULAR LENS IMPLANT Right 12/16/2020    Procedure: CEIOL OD;  Surgeon: Rach Truong MD;  Location: [facility] OR;  Service: Ophthalmology;  Laterality: Right;    COLONOSCOPY N/A 7/28/2023    Procedure: COLONOSCOPY;  Surgeon: Xi Acosta MD;  Location: [facility];  Service: Endoscopy;  Laterality: N/A;    COLONOSCOPY W/ POLYPECTOMY      PROSTATE BIOPSY      PROSTATECTOMY  2018    VITRECTOMY BY PARS PLANA APPROACH Right 12/19/2018    Procedure: VITRECTOMY, PARS PLANA APPROACH;  Surgeon: CLAUDINE Melendrez MD;  Location: [address];  Service: Ophthalmology;  Laterality: Right;  40 min       Social History: Patient lives alone. Visits [facility] monthly and stays for several weeks.    Prior Intubation HX:  NA    Modified Barium Swallow: NA    Imaging:  Imaging Results               MRI Brain Without Contrast (Final result)  Result time 06/25/25 13:26:21      Final result by Steven Weiner MD (06/25/25 13:26:21)                   Impression:      1.  Acute lacunar type infarct within the lateral aspect of the right thalamus.  No associated hemorrhage or mass effect.    This report was flagged in Epic as abnormal.      Electronically signed by: Steven Weiner  Date:    06/25/2025  Time:    13:26               Narrative:    EXAMINATION:  MRI BRAIN WITHOUT CONTRAST    CLINICAL HISTORY:  Neuro deficit, acute, stroke suspected;.    TECHNIQUE:  Multiplanar multisequence MR imaging of the brain was performed without contrast    COMPARISON:  Head CT 06/25/2025. CTA 06/25/2025    FINDINGS:  Brain: There is a tiny acute lacunar type infarct within the lateral aspect of the right thalamus.  Minimal corresponding edema but no mass effect or associated hemorrhage.  No additional recent infarcts.  Background mild age related chronic small  vessel ischemic and involutional changes as well as a tiny focus of cortical gliosis/encephalomalacia within the high right parietal lobe near the midline.  No discrete parenchymal mass or space-occupying extra-axial fluid collections identified.  Midline Structures: The midline structures of the brain are normal.  Ventricles: The ventricles, sulci and cisterns are within normal limits.  Vasculature: The vascular flow voids at the base of the brain are within normal limits.  Calvarium: The visualized osseous structures are unremarkable.  Sinuses: The paranasal sinuses are adequately aerated.  Orbits: Ocular lens replacement on the right.  Mastoids: The mastoid air cells are adequately aerated.  Extracranial soft tissues: The visualized extracranial soft tissues are unremarkable.                                       CTA Head and Neck (xpd) (Final result)  Result time 06/25/25 09:47:15      Final result by Steven Weiner MD (06/25/25 09:47:15)                   Impression:      1. No acute intracranial CT findings.  No proximal intracranial large vessel occlusion or hemodynamically significant stenosis.  2. No hemodynamically significant stenosis within the neck noting approximate 50% stenosis of the proximal left subclavian artery and less than 50% stenosis of the proximal right cervical ICA.      Electronically signed by: Steven Weiner  Date:    06/25/2025  Time:    09:47               Narrative:    EXAMINATION:  CTA HEAD AND NECK (XPD)    CLINICAL HISTORY:  Neuro deficit, acute, stroke suspected;    TECHNIQUE:  Non contrast low dose axial images were obtained through the head. CT angiogram was performed from the level of the magdalena to the top of the head following the IV administration of 75mL of Omnipaque 350.   Sagittal and coronal reconstructions and maximum intensity projection reconstructions were performed. Arterial stenosis percentages are based on NASCET measurement criteria.    COMPARISON:  Head CT  09/30/2020.    FINDINGS:  CTA HEAD:    Vasculature: Mild irregular stenosis of the proximal right PCA P1 segment extending into the P2 segment.  Mild atherosclerotic narrowing of the bilateral cavernous carotid arteries, right greater than left.  No other concerning focal stenosis or proximal large vessel occlusion identified.There is no suspicion of vascular malformation or saccular aneurysm.    Variant anatomy: Hypoplastic or aplastic right A1 DESHAUN.    Brain: There is no evidence of mass, mass effect, edema, midline shift, intracranial hemorrhage, or space-occupying extra-axial fluid collection. After the administration of contrast there is no abnormal enhancement.  No CT evidence of an acute major vascular territory infarct.    Ventricles/Sulci: The ventricles and sulci are appropriate in size for age.    Osseous Structures: The osseous structures are unremarkable in appearance.    Sinuses and orbits: Mild scattered mucosal thickening which appears chronic within the maxillary and ethmoid sinuses.  No acute orbital process.    Other: Mastoid air cells are clear.    CTA NECK:    Aorta: Left-sided conventional 3 vessel arch.  Approximate 50% stenosis involving the proximal left subclavian artery.    Vertebral Arteries: The origins of the vertebral arteries are patent.  No flow limiting stenosis, occlusion, or dissection.    Right Carotid: No flow limiting stenosis, occlusion, or dissection of the common carotid or internal carotid arteries.  Mild calcified plaque of the proximal ICA.  Right internal carotid artery: Less than 50 % stenosis by and NASCET.    Left Carotid: No flow limiting stenosis, occlusion, or dissection of the common carotid or internal carotid arteries. No significant plaque of the proximal ICA. Left internal carotid artery: 0 % stenosis by and NASCET.    Extravascular Anatomy: Scattered ground-glass densities throughout the visualized lungs which could be seen the setting of atelectasis in the  "absence of any clinical concern for any superimposed infectious/inflammatory process. No consolidation identified.  Degenerative changes of the cervical spine including severe disc height loss at C3-C4 and C5-C6 and C6-C7.                                       Prior diet: Regular/thin.    Occupation/hobbies/homemaking: Pt/family report PLOF as independent with ambulation and ADLS, independent with IADLS. Pt does drive. Level of education is 12th grade (did not graduate HS).    Subjective     "I almost bit my lip two times this morning."    Pain/Comfort:  Pain Rating 1: 0/10    Respiratory Status: Room air    Objective:   Pt seen for clincal swallow and cobnivei communciation evauiatons. Heis AAOx4, pleasant and cooperiave. Pt niharika any chagnes in speech, lnaguae, congition or swallowing. Reports poor memory at [facility].    Cognitive Status:    MoCA (Version 8.1) administered with pt achieving a score of 23/30 (WNL is >/= 26/30) suggesting  mild cognitive-linguistic impairment. Pt earned 4 of 5 points on visuospatial/executive functions, 3 of 3 points on naming, 6 of 6 points for attention, 1 of 3 points for language, 2 of 2 points for abstraction, 1 of 5 points for delayed recall and 5 of 6 points for orientation.  (+1 for level of education)    Behavioral Observations: Pleasant, alert & appropriate  Memory; Impaired    Immediate: Pt able to repeat 4/5 words and 5/5 digits   Working: Pt demonstrated difficulty applying verbally provided information to answer functional math/time calculations; repetitions of verbal stimuli requested and/or deemed necessary   Short-term: Pt able to recall 1/5 items ind'ly, increasing to 2/5 with cue, following 5 minute filled delay. Pt demonstrates functional recall by providing information such as recent medical history and recent daily events (meals/previous therapies/etc.).    Long-term: Intact; Pt provided detailed biographical and medical history   Orientation: oriented x " 4  Attention: Pt demonstrated functional focused, sustained and alternating attention within MOCA tasks including target detection using tapping, serial subtractions and symbol trails   Problem Solving: Pt provided appropriate responses to judgement/safety situations across 2 of 2 trials. Pt completed time and money word problems with 100% accuracy  Pragmatics: appropriate     Receptive Language:   Comprehension:      [facility]  Questions Simple yes/no --WFL  Complex yes/no --WFL  Commands  One step --WFL  two step basic commands --WFL  multistep basic commands --WFL  Conversation --WFL     Expressive Language:  Verbal:    [facility]  Verbal Fluency: Pt generated 6 words ind'ly during letter fluency tasks given one minute time constraint   Conversational speech: Fluent and appropriate at the conversational level without evidence of word retrieval, semantic or syntactic error    Motor Speech:  No evidence of Apraxia of Speech or Dysarthria  100% intelligible     Voice: [facility]  Quality harsh/strained    Visual-Spatial: L  Attending to R and L planes w/out evidence of inattention, neglect or preferential gaze     Oral Musculature Evaluation  Oral Musculature: WFL  Dentition: present and adequate  Secretion Management: adequate  Mucosal Quality: adequate  Mandibular Strength and Mobility: WFL  Oral Labial Strength and Mobility: WFL  Lingual Strength and Mobility: [facility]  Velar Elevation: [facility]  Buccal Strength and Mobility: [facility]  Volitional Cough: adequate  Volitional Swallow: able to palpate laryngeal rise  Voice Prior to PO Intake: harsh  Oral Musculature Comments: report reduced sensation to L jawline and lip    Bedside Swallow Eval:   Consistencies Assessed:  Thin liquids --via cup and straw  Mixed consistencies --mandarin oranges  Solids --omer cracker     Oral Phase:   WFL    Pharyngeal Phase:   no overt clinical signs/symptoms of aspiration  no overt clinical signs/symptoms of pharyngeal dysphagia    Compensatory  Strategies  None    Treatment: Pt/family educated re: results/recs of evaluation, SLP role and POC. Receptive to information provided.     Goals:   Multidisciplinary Problems       SLP Goals          Problem: SLP    Goal Priority Disciplines Outcome   SLP Goal     SLP    Description: 1. Patient will recall memory strategies independently across 3 session  2. Pt will recall 5/5 items from functional lists, following 3 minute filled delay, using learned memory strategies with SPV                       Plan:     Patient to be seen:  3 x/week   Plan of Care expires:  07/03/25  Plan of Care reviewed with:  patient   SLP Follow-Up:  Yes       Time Tracking:     SLP Treatment Date:   06/26/25  Speech Start Time:  1110  Speech Stop Time:  1140     Speech Total Time (min):  30 min    Billable Minutes: Eval 14 , Speech Therapy Individual 8, Eval Swallow and Oral Function 8, and Total Time 30    06/26/2025

## 2025-06-26 NOTE — DISCHARGE INSTRUCTIONS
Follow up with a PCP and Neurology.          Discharge Instructions, [facility]    Thank you for choosing [facility] for your medical care. The primary doctor who is taking care of you at the time of your discharge is Rubens Thomas MD.     You were admitted to the hospital with Acute ischemic VBA thalamic stroke.     Please note your discharge instructions, including diet/activity restrictions, follow-up appointments, and medication changes.  If you have any questions about your medical issues, prescriptions, or any other questions, please feel free to contact the [facility] Medicine Dept at [phone] and we will help.    If you are previously with Home health, outpatient PT/OT or under a therapy program, you are cleared to return to those programs.    Please direct all long term medication refills and follow up to your primary care provider, Sandi Nix MD. Thank you again for letting us take care of your health care needs.    Please note the following discharge instructions per your discharging physician-  Wendy Reid PA-C

## 2025-06-26 NOTE — PLAN OF CARE
Problem: Physical Therapy  Goal: Physical Therapy Goal  Description: Goals to be met by: 07-     Patient will increase functional independence with mobility by performin. Supine to sit with [place]  2. Sit to stand transfer with Supervision  3. Bed to chair transfer with Supervision using No Assistive Device  4. Gait  x over 500 feet with Supervision using No Assistive Device.   5. Lower extremity exercise program x20 reps   Outcome: Progressing   PT eval and treat. Gait with HHA 250ft with CGA. Bathroom use to void and OOB chair. LIT- is home alone

## 2025-06-26 NOTE — PLAN OF CARE
Problem: Stroke, Ischemic (Includes Transient Ischemic Attack)  Goal: Optimal Coping  Outcome: Progressing  Goal: Optimal Cognitive Function  Outcome: Progressing  Goal: Improved Communication Skills  Outcome: Progressing  Goal: Optimal Functional Ability  Outcome: Progressing     Problem: Fall Injury Risk  Goal: Absence of Fall and Fall-Related Injury  Outcome: Progressing     Problem: Adult Inpatient Plan of Care  Goal: Plan of Care Review  Outcome: Progressing  Goal: Patient-Specific Goal (Individualized)  Outcome: Progressing  Flowsheets (Taken 6/26/2025 0244)  Individualized Care Needs: neuro checks q4 hours, q3 hr vital signs, promote adequate rest.  Anxieties, Fears or Concerns: Patient denies any concerns.  Patient/Family-Specific Goals (Include Timeframe): Patient will be free from episodes of facial numbness/ tingling by 6/27/2025.  Goal: Optimal Comfort and Wellbeing  Outcome: Progressing

## 2025-06-27 ENCOUNTER — PATIENT MESSAGE (OUTPATIENT)
Dept: ADMINISTRATIVE | Facility: CLINIC | Age: 75
End: 2025-06-27
Payer: MEDICARE

## 2025-06-27 ENCOUNTER — TELEPHONE (OUTPATIENT)
Dept: ADMINISTRATIVE | Facility: CLINIC | Age: 75
End: 2025-06-27
Payer: MEDICARE

## 2025-06-27 ENCOUNTER — PATIENT OUTREACH (OUTPATIENT)
Dept: ADMINISTRATIVE | Facility: CLINIC | Age: 75
End: 2025-06-27
Payer: MEDICARE

## 2025-06-27 ENCOUNTER — CLINICAL SUPPORT (OUTPATIENT)
Dept: REHABILITATION | Facility: HOSPITAL | Age: 75
End: 2025-06-27
Payer: MEDICARE

## 2025-06-27 VITALS — DIASTOLIC BLOOD PRESSURE: 85 MMHG | SYSTOLIC BLOOD PRESSURE: 131 MMHG | HEART RATE: 100 BPM

## 2025-06-27 DIAGNOSIS — R27.8 COORDINATION IMPAIRMENT: Primary | ICD-10-CM

## 2025-06-27 DIAGNOSIS — I63.81 THALAMIC INFARCTION: ICD-10-CM

## 2025-06-27 PROCEDURE — 97165 OT EVAL LOW COMPLEX 30 MIN: CPT | Mod: HCNC,PO

## 2025-06-27 PROCEDURE — 97530 THERAPEUTIC ACTIVITIES: CPT | Mod: HCNC,PO

## 2025-06-27 NOTE — PROGRESS NOTES
C3 nurse spoke with Efraín Anton for a TCC post hospital discharge follow up call. The patient has a scheduled HOSFU appointment with Deonte Talbot on 07/03/25 @ 9605.

## 2025-06-27 NOTE — TELEPHONE ENCOUNTER
Spoke to pt about the Neurology referral order that was placed for him, says going to his daughter in [place] and will see a provider there
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
## 2025-06-27 NOTE — PLAN OF CARE
Through communication with [facility], the Inpatient order is being changed to observation as the payer will not authorize Inpatient and the facility agrees not to appeal or challenge the change in status. The account will be changed to Observation for billing purposes.

## 2025-06-27 NOTE — PATIENT INSTRUCTIONS
OCCUPATIONAL THERAPY HOME PROGRAM SUGGESTIONS    *PICK 3-4 ACTIVITIES PER DAY AND ALTERNATE.    GRASP  A. CRUMPLE SHEETS OF PAPER IN HAND. KEEP BALL OF HAND IN ONE PLACE.  B. ALTERNATELY SQUEEZE AND RELEASE: SPONGE, PUTTY, RUBBER BALL, YARN BALL.  C. STACK: PAPER CUPS, CONES, BLOCKS.  D. USE TOOLS: HAMMER, SCREWDRIVER, HOLE , WIRE CUTTERS, LEATHER PUNCH.  E. WRING OUT WET CLOTH    PINCH  A. STATIC PINCH: PAINTING, METAL TOOLING, LEATHER LACING, WRITING, CUTTING, CARRY BOOK OR NEWSPAPER BETWEEN THUMB AND INDEX SILVIA.  B. TIP PINCH: STRING BEADS, POP BEADS, MAKE AN O BY TOUCHING THUMB TO EACH FINGERTIP ONE AT A TIME,  SMALL OBJECTS AS BEAD, NAIL, PIN, TOOTHPICK.  C. PALMAR AND LATERAL PINCH: USE TWEEZERS TO  SMALL OBJECTS, CLIP CLOTHES PINS ON EDGE OF CAN, PEGBOARD GAMES AS IQ, SQUEEZE AND RELEASE PUTTY, ERIK ACTIVITIES,  PLACE AND TURN AND RELEASE VARIOUS SIZE AND SHAPED OBJECTS AS SPOOLS OF THREAD, BLOCKS, PEGS, GRASP CARDS ONE AT A TIME TO PALM OF HAND, [id number].    HAND STRENGTHENING  A. ACTIVITES: GARDENING, WEEDING, PIANO, TYPING, WASH DISHES, USE SCISSORS, HOLE .  B. CLIP CLOTHES PINS ON EDGE OF CAN, INTERLANCE FINGERS; TURN PALMS OUT.  C. SQUEEZE AND PULL APART PUTTY (OTHER PUTTY EXERCISES).  D. TAP FINGERS ON TABLE.  E. PALMS TOGETHER, PUSH WEAK FINGERS AGAINST STRONG FINGERS.  F. PLACE KNOTTED TOWEL IN WEAK HAND, PULL IT THROUGH.  G. PALMS FLAT ON TALBE: SPREAD FINGERS APART THEN BRING TOGETHER; RAUSE EACH FINGER OFF TABLE AND LOWER IT; FINGERS BENT FLIP OBJECT BY STGRAIGHTENING FINGERS.  H. RUBBER BAND EXERCISES.  I. WIRE CUTTERS, SCREWDRIVER.  J. SQUIRT GUN.  K. HAND GRIPPER EXERCISES.  L. PRACTICE SHOOTING BEANS OR MARBLES AT A [employer].  M. TIDDLYWINKS, NO TOUCHING THUMBS.    HAND DEXTERITY (AS YOU IMPROVE, TRY TO INCREASE SPEED)  A. ACTIVITIES: TYPING, PIANO, WRITING, JIG-SAW PUZZLES, CHECKERS, CHESS, KNIT, SEW BUTTONS, OTHER FASTENERS, COMPUTERS, ADDING MACHINE,  CALCULATOR.  B.  AND STACK COINS. DO NOT SLIDE TO EDGE OF TABLE.  C.  INDIVIDUALLY AND RELEASE IN CONTINTER: MARBLES, NAILS, STRAIGHT PINS.  D. HOLD HANDFUL OF COINS (POKER CHIPS) AND SLIDE OFF FINGERTIP ONE AT A TIME.  E. TWIST NUTS AND BOLTS.  F. TAP FINGERS ON TABLE.  G. SHUFFLE CARDS, DEAL THEM,  ONE AT A TIME, PLAY CARD CAMES.  H. CHAIN PAPER CLIPS TOGETHER.  I. TURN PENCILS IN FINGERS.  J. TIE KNOTS, BRAID WITH HEAVY CORD, ROYAE  K. STACK PENNIES (50) ROLL COINS.  L. STRING BEADS SEW BUTTONING.  M. WASH DISHES.  N. PICKUP, PLACE AND RELEASE VARIOUS SIZED AND SHAPED OBJECTS: PEGS, DRIED SEEDS, BLOCKS, TOOTHPICKS, SPOOLS OF THREAD.  O. FINGER WALK ACROSS TABLE.  P. FEEDING, DRINKING OUT OF A CUP AND GLASS, ALL FINGER FOODS.  Q. FINGER WARS.  R. PUT 2 BLOCKS IN HAND AND ROTATE THEM AROUND CLOCKWISE (COUNTER-CLOCKWISE IS HARDER) THEN TRY WITH BALLS.    After you have practiced any activity above, begin to time yourself and try to become faster at each task.    *Take meds as prescribed by hospital doctor.    *Follow up is scheduled on 7/3 with Dr. Deonte Talbot. If you plan to be in [place] then, please cancel this appointment and get a primary care follow-up in [place] scheduled in the next 3 weeks for hospital follow-up.    *Take BP every day - get help with machine set-up.    *Get a speech order if you keep having trouble bitin your cheek.    *Get OT order if you have ongoing concerns about left arm coordination.   *When driving, stop EVERY 2 HOURS to stretch.   *BE FAST for awareness of stroke symptoms.  *Control all manageable risk factors.

## 2025-06-27 NOTE — PROGRESS NOTES
C3 nurse attempted to contact Erfaín Anton for a T[facility] post hospital discharge follow up call. No answer.  No VM.  The patient has a scheduled HOSFU appointment with Deonte Talbot on 07/03/25 @ 1925.

## 2025-06-28 LAB
HOLD SPECIMEN: NORMAL
HOLD SPECIMEN: NORMAL

## 2025-07-02 PROBLEM — R27.8 COORDINATION IMPAIRMENT: Status: ACTIVE | Noted: 2025-07-02

## 2025-08-14 ENCOUNTER — LAB VISIT (OUTPATIENT)
Dept: LAB | Facility: HOSPITAL | Age: 75
End: 2025-08-14
Attending: NURSE PRACTITIONER
Payer: MEDICARE

## 2025-08-14 DIAGNOSIS — N18.31 STAGE 3A CHRONIC KIDNEY DISEASE: ICD-10-CM

## 2025-08-14 LAB
ANION GAP (OHS): 6 MMOL/L (ref 8–16)
BUN SERPL-MCNC: 23 MG/DL (ref 8–23)
CALCIUM SERPL-MCNC: 8.7 MG/DL (ref 8.7–10.5)
CHLORIDE SERPL-SCNC: 108 MMOL/L (ref 95–110)
CO2 SERPL-SCNC: 25 MMOL/L (ref 23–29)
CREAT SERPL-MCNC: 1.2 MG/DL (ref 0.5–1.4)
GFR SERPLBLD CREATININE-BSD FMLA CKD-EPI: >60 ML/MIN/1.73/M2
GLUCOSE SERPL-MCNC: 85 MG/DL (ref 70–110)
POTASSIUM SERPL-SCNC: 4.7 MMOL/L (ref 3.5–5.1)
SODIUM SERPL-SCNC: 139 MMOL/L (ref 136–145)

## 2025-08-14 PROCEDURE — 36415 COLL VENOUS BLD VENIPUNCTURE: CPT | Mod: HCNC,PO

## 2025-08-14 PROCEDURE — 80048 BASIC METABOLIC PNL TOTAL CA: CPT | Mod: HCNC

## 2025-08-15 ENCOUNTER — RESULTS FOLLOW-UP (OUTPATIENT)
Dept: FAMILY MEDICINE | Facility: CLINIC | Age: 75
End: 2025-08-15
Payer: MEDICARE

## (undated) DEVICE — SYR DISP LL 5CC

## (undated) DEVICE — PACK CUSTOM EYE KIT

## (undated) DEVICE — SHIELD EYE PLASTIC 3100G

## (undated) DEVICE — SHIELD EYE METAL FOX 50/BX

## (undated) DEVICE — CARTRIDGE LENS D

## (undated) DEVICE — CANNULA 25GA SOFT TIP

## (undated) DEVICE — TIP I & A

## (undated) DEVICE — PACK AUTO GAS FILL

## (undated) DEVICE — DRESSING TRANS 4X4 TEGADERM

## (undated) DEVICE — SLEEVE ULTRA INFUSION

## (undated) DEVICE — SYS LABEL CORRECT MED

## (undated) DEVICE — CYSTOTOME IRRIG 24G 13MM

## (undated) DEVICE — SYR LUER LOCK 1CC

## (undated) DEVICE — NDL HYPODERMIC BLUNT 18G 1.5IN

## (undated) DEVICE — DRESSING EYE OVAL LF

## (undated) DEVICE — TIP PHACO 45 DEGREE KELMAN

## (undated) DEVICE — CANNULA OPTHALMIC SOF TIP 25G

## (undated) DEVICE — SOL BETADINE 5%

## (undated) DEVICE — LENS VITRCTMY OPHTH 30DEG 59DE